# Patient Record
Sex: FEMALE | Race: WHITE
[De-identification: names, ages, dates, MRNs, and addresses within clinical notes are randomized per-mention and may not be internally consistent; named-entity substitution may affect disease eponyms.]

---

## 2017-11-03 NOTE — TELEPHONE ENCOUNTER
APPT INFO    Date /Time:  11/15/17 10AM   Reason for Appt: R knee s/p R knee arthroscopy-    Ref Provider/Clinic:  YOVANY WHITEHEAD/ [facility]   Are there internal records? If yes, list: None for knee   Patient Contact (Y/N) & Call Details: No- referral   Action: Reviewed records     OUTSIDE RECORDS CHECKLIST     CLINIC NAME  COMMENTS REC (x) IMG (x)   [facility]  x            Records Received From:  [facility]     Date/Exam/Location  (specify location if different)   Office Notes:  10/16/17, 7/24/17, 7/13/17, 7/3/17, 6/19/17   Radiology Reports: - MRI lower right ext 7/7/17  - both knees 6/19/17  - US right lower ext 3/30/17  - right knee 2/20/17  Natalie De La Paz Notified (Y/N):    Procedure Notes:  (include injections) - right knee arthroscopy 10/4/17 Dr Bhatti   - right knee injection 7/13/17  - aspiration 6/9/17   Pathology Reports:  10/4/17   Missing:  knee replacement in 2016, images

## 2017-11-09 ASSESSMENT — ENCOUNTER SYMPTOMS
ARTHRALGIAS: 1
STIFFNESS: 1
JOINT SWELLING: 1

## 2017-11-11 ENCOUNTER — HEALTH MAINTENANCE LETTER (OUTPATIENT)
Age: 67
End: 2017-11-11

## 2017-11-13 NOTE — TELEPHONE ENCOUNTER
Received Imaging From:  [facility]    Image Type (x): Disc:__x___  Pacs:_____      Exam Date/Name:  right knee 12/28/15, 6/19/17 Comments: will bring to film room

## 2017-11-15 ENCOUNTER — PRE VISIT (OUTPATIENT)
Dept: ORTHOPEDICS | Facility: CLINIC | Age: 67
End: 2017-11-15

## 2017-11-15 ENCOUNTER — OFFICE VISIT (OUTPATIENT)
Dept: ORTHOPEDICS | Facility: CLINIC | Age: 67
End: 2017-11-15

## 2017-11-15 VITALS — WEIGHT: 207.7 LBS | BODY MASS INDEX: 34.6 KG/M2 | HEIGHT: 65 IN

## 2017-11-15 DIAGNOSIS — M65.961 SYNOVITIS OF RIGHT KNEE: Primary | ICD-10-CM

## 2017-11-15 RX ORDER — METOPROLOL TARTRATE 25 MG/1
25 TABLET, FILM COATED ORAL
COMMUNITY
Start: 2017-11-01 | End: 2020-01-17

## 2017-11-15 NOTE — NURSING NOTE
"Reason For Visit:   Chief Complaint   Patient presents with     Consult     Pt. states that she is here today for Right Knee Pain. HX: RIGHT KNEE ARTHROSCOPY WITH PARTIAL LATERAL MENISCECTOMY, DOS: 10/04/2017. Referring:  YOVANY WHITEHEAD       Pain Assessment  Patient Currently in Pain: Yes  0-10 Pain Scale: 4  Primary Pain Location: Knee  Pain Orientation: Right  Pain Descriptors: Discomfort  Alleviating Factors: Rest, NSAIDS  Aggravating Factors: Walking, Standing, Sitting, Movement               HEIGHT: 5' 4.567\", WEIGHT: 207 lbs 11.2 oz, BMI: Body mass index is 35.03 kg/(m^2).      Current Outpatient Prescriptions   Medication Sig Dispense Refill     Acetaminophen (TYLENOL PO)        metoprolol (LOPRESSOR) 25 MG tablet Take 25 mg by mouth       Naproxen Sodium (ALEVE PO) Take 1 tablet by mouth.         SIMVASTATIN PO Take 20 mg by mouth At Bedtime.       Fluticasone-Salmeterol (ADVAIR HFA IN) Inhale 1 puff into the lungs. 1-2 TIMES DAILY AS NEEDED       Multiple Vitamin (DAILY MULTIVITAMIN PO) Take  by mouth daily.       Calcium Carbonate-Vitamin D (CALCIUM + D PO) Take  by mouth daily.       Albuterol Sulfate (VENTOLIN IN) Inhale 2 puffs into the lungs as needed.            Allergies   Allergen Reactions     Animal Dander      Grass      Pollen Extract      Seasonal Allergies                "

## 2017-11-15 NOTE — LETTER
"11/15/2017       RE: Donna Etienne  [address]  [facility] [address]     Dear Colleague,    Thank you for referring your patient, Donna Etienne, to the [facility] at [facility]. Please see a copy of my visit note below.    I was present with the resident during the history and exam.  I discussed the case with the resident and agree with the findings as documented in the assessment and plan.        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     Plan:  We'll obtain synovial specimens from [place] and have these reviewed here. Once we have confirm the diagnosis we will reevaluate and determine plan.           History of Present Illness:   66 year old female  chief complaint: Right knee pain    HPI:  66, female, asthma with right knee pain. Had a right medial unicompartmental knee arthroplasty in 2016. This resolved her medial sided only pain. She then had an acute episode and developed lateral sided pain. MRI from June 2017 revealed a lateral meniscus tear. This was debrided and 10/4/2017. At that time was noted to be hypertrophic and nodular synovial tissue. This was sent for biopsy. Biopsy was concerning for PVNS. She continues have anterior mostly knee pain. Worse with stairs and P and worse after prolonged periods of sitting. No fevers or chills. No issues with wound healing. History bleeding or clotting problems.           Physical Exam:     EXAMINATION pertinent findings:   VITAL SIGNS: Height 1.64 m (5' 4.57\"), weight 94.2 kg (207 lb 11.2 oz).  Body mass index is 35.03 kg/(m^2).  RESP: non labored breathing   ABD: benign   SKIN: grossly normal   LYMPHATIC: " grossly normal   NEURO: grossly normal   VASCULAR: satisfactory perfusion of all extremities   MUSCULOSKELETAL:   Examination of the right knee: There is minimal effusion. There is no erythema or warmth. Her incisions are well-healed. Range of motion is 0  to 120 . Stable to varus and valgus. She has some mild crepitus in the patellofemoral joint with motion. She is tender over the lateral joint line and inferior patella. She is otherwise distally neurovascularly intact.             Data:   All laboratory data reviewed  All imaging studies reviewed by me    X-rays including MRI from June 2017 reviewed. There is some mild thickening of the synovium but no discrete masses or significant synovial hypertrophy.    Patient seen and discussed with Dr. Marilee Cárdenas  PGY4  [phone]      DATA for DOCUMENTATION:         Past Medical History:     Patient Active Problem List   Diagnosis   (none) - all problems resolved or deleted     Past Medical History:   Diagnosis Date     Asthma      DJD (degenerative joint disease)      Hepatitis childhood    type A     Liver disease     Hepatits A as a child     Osteopenia      Phlebitis     post childbirth 25 years ago     PONV (postoperative nausea and vomiting)        Also see scanned health assessment forms.       Past Surgical History:     Past Surgical History:   Procedure Laterality Date     ANGIOGRAPHY      HEART CATH     APPENDECTOMY       ARTHROPLASTY KNEE      Partial     ARTHROPLASTY KNEE      LEFT PARTIAL     C HAND/FINGER SURGERY UNLISTED      many on both     C SHOULDER SURG PROC UNLISTED  03/2017    rt. shoulder replacement     CL AFF SURGICAL PATHOLOGY       EXCISE MASS UPPER EXTREMITY  9/25/2012    Procedure: EXCISE MASS UPPER EXTREMITY;  Right Upper Arm Mass Excision;  Surgeon: Roge Do MD;  [facility]     HC INCISION TENDON SHEATH FINGER       KNEE SURGERY  11/18/2017    rt. knee partical replacement     RELEASE TRIGGER FINGER       RIGHT INDEX     REPAIR TENDON FINGER(S)  7/12/2012    Procedure: REPAIR TENDON FINGER(S);  Right Index Finger Tendon Sheath Reconstruction Using Flexor  Carpi Radialis (1/2) Tendon Graft.      ;  Surgeon: Alla Do MD;  [facility]     SCAPHOID/LUNATE LIGAMENT REPAIR[       SUTURE SUSPENSIONPLASTY THUMB  2/7/2012    Procedure:SUTURE SUSPENSIONPLASTY THUMB; Left Thumb Trapezial Excision Suture Suspenionplasty  ; Surgeon:ALLA DO; Location:[facility] OR     WRIST SURGERY              Social History:     Social History     Social History     Marital status:      Spouse name: N/A     Number of children: N/A     Years of education: N/A     Occupational History     Not on file.     Social History Main Topics     Smoking status: Never Smoker     Smokeless tobacco: Never Used     Alcohol use Yes      Comment: very seldom     Drug use: No     Sexual activity: Yes     Partners: Male     Birth control/ protection: Male Surgical      Comment: only with      Other Topics Concern     Not on file     Social History Narrative            Family History:       Family History   Problem Relation Age of Onset     Hypertension Mother      OSTEOPOROSIS Mother      Alcoholism Brother      Alcoholism Sister      Alcoholism Paternal Grandfather             Medications:     Current Outpatient Prescriptions   Medication Sig     Acetaminophen (TYLENOL PO)      metoprolol (LOPRESSOR) 25 MG tablet Take 25 mg by mouth     Naproxen Sodium (ALEVE PO) Take 1 tablet by mouth.       SIMVASTATIN PO Take 20 mg by mouth At Bedtime.     Fluticasone-Salmeterol (ADVAIR HFA IN) Inhale 1 puff into the lungs. 1-2 TIMES DAILY AS NEEDED     Multiple Vitamin (DAILY MULTIVITAMIN PO) Take  by mouth daily.     Calcium Carbonate-Vitamin D (CALCIUM + D PO) Take  by mouth daily.     Albuterol Sulfate (VENTOLIN IN) Inhale 2 puffs into the lungs as needed.     No current facility-administered medications for this visit.                Review of Systems:   A comprehensive 10 point review of systems (constitutional, ENT, cardiac, peripheral vascular, lymphatic, respiratory, GI, , Musculoskeletal, skin, Neurological) was performed and found to be negative except as described in this note.     See intake form completed by patient      Again, thank you for allowing me to participate in the care of your patient.      Sincerely,    Kiran Hnut MD

## 2017-11-15 NOTE — MR AVS SNAPSHOT
"              After Visit Summary   11/15/2017    Donna Etienne    MRN: [id number]           Patient Information     Date Of Birth          1950        Visit Information        Provider Department      11/15/2017 10:00 AM Kiran Hunt MD [facility]        Today's Diagnoses     Synovitis of right knee    -  1       Follow-ups after your visit        Who to contact     Please call your clinic at [phone] to:    Ask questions about your health    Make or cancel appointments    Discuss your medicines    Learn about your test results    Speak to your doctor   If you have compliments or concerns about an experience at your clinic, or if you wish to file a complaint, please contact [facility] Patient Relations at [phone] or email us at [email or web address]         Additional Information About Your Visit        MyChart Information     [employer] gives you secure access to your electronic health record. If you see a primary care provider, you can also send messages to your care team and make appointments. If you have questions, please call your primary care clinic.  If you do not have a primary care provider, please call [phone] and they will assist you.      [employer] is an electronic gateway that provides easy, online access to your medical records. With [employer], you can request a clinic appointment, read your test results, renew a prescription or communicate with your care team.     To access your existing account, please contact your [facility] Clinic or call [phone] for assistance.        Care EveryWhere ID     This is your Care EveryWhere ID. This could be used by other organizations to access your [facility] medical records  [id number]        Your Vitals Were     Height BMI (Body Mass Index)                1.64 m (5' 4.57\") 35.03 kg/m2           Blood Pressure from Last 3 Encounters:   09/25/12 104/69 "   07/12/12 112/79   02/07/12 101/77    Weight from Last 3 Encounters:   11/15/17 94.2 kg (207 lb 11.2 oz)   09/25/12 90.6 kg (199 lb 11.8 oz)   07/12/12 92.1 kg (203 lb)              Today, you had the following     No orders found for display       Primary Care Provider Office Phone # Fax #    Puma Joseph [phone]       [facility] [address] [address]        Equal Access to Services     DEVIN THOMSON : Hadii santos thao hadasho Soomaali, waaxda luqadaha, qaybta kaalmada adeegyastacy, eva figueroa . So [facility] [phone].    ATENCIÓN: Si habla español, tiene a ramos disposición servicios gratuitos de asistencia lingüística. Llame al [phone].    We comply with applicable federal civil rights laws and [place] laws. We do not discriminate on the basis of race, color, national origin, age, disability, sex, sexual orientation, or gender identity.            Thank you!     Thank you for choosing [facility]  for your care. Our goal is always to provide you with excellent care. Hearing back from our patients is one way we can continue to improve our services. Please take a few minutes to complete the written survey that you may receive in the mail after your visit with us. Thank you!             Your Updated Medication List - Protect others around you: Learn how to safely use, store and throw away your medicines at www.disposemymeds.org.          This list is accurate as of: 11/15/17 11:59 PM.  Always use your most recent med list.                   Brand Name Dispense Instructions for use Diagnosis    ADVAIR HFA IN      Inhale 1 puff into the lungs. 1-2 TIMES DAILY AS NEEDED        ALEVE PO      Take 1 tablet by mouth.        CALCIUM + D PO      Take  by mouth daily.        DAILY MULTIVITAMIN PO      Take  by mouth daily.        metoprolol 25 MG tablet    LOPRESSOR     Take 25 mg by mouth        SIMVASTATIN PO      Take 20 mg by mouth  At Bedtime.        TYLENOL PO           VENTOLIN IN      Inhale 2 puffs into the lungs as needed.

## 2017-11-15 NOTE — PROGRESS NOTES
"    [facility], Orthopaedic Surgery Consultation    Donna Etienne MRN# [id number]   Age: 66 year old YOB: 1950     Requesting physician: Boom Woodard            Assessment and Plan:   Assessment:  66, female, asthma with right anterior knee pain and outside synovial biopsy from 10-6-17 concerning for PVNS.     Plan:  We'll obtain synovial specimens from [place] and have these reviewed here. Once we have confirm the diagnosis we will reevaluate and determine plan.           History of Present Illness:   66 year old female  chief complaint: Right knee pain    HPI:  66, female, asthma with right knee pain. Had a right medial unicompartmental knee arthroplasty in 2016. This resolved her medial sided only pain. She then had an acute episode and developed lateral sided pain. MRI from June 2017 revealed a lateral meniscus tear. This was debrided and 10/4/2017. At that time was noted to be hypertrophic and nodular synovial tissue. This was sent for biopsy. Biopsy was concerning for PVNS. She continues have anterior mostly knee pain. Worse with stairs and P and worse after prolonged periods of sitting. No fevers or chills. No issues with wound healing. History bleeding or clotting problems.           Physical Exam:     EXAMINATION pertinent findings:   VITAL SIGNS: Height 1.64 m (5' 4.57\"), weight 94.2 kg (207 lb 11.2 oz).  Body mass index is 35.03 kg/(m^2).  RESP: non labored breathing   ABD: benign   SKIN: grossly normal   LYMPHATIC: grossly normal   NEURO: grossly normal   VASCULAR: satisfactory perfusion of all extremities   MUSCULOSKELETAL:   Examination of the right knee: There is minimal effusion. There is no erythema or warmth. Her incisions are well-healed. Range of motion is 0  to 120 . Stable to varus and valgus. She has some mild crepitus in the patellofemoral joint with motion. She is tender over the lateral joint line and inferior patella. She is otherwise distally " neurovascularly intact.             Data:   All laboratory data reviewed  All imaging studies reviewed by me    X-rays including MRI from June 2017 reviewed. There is some mild thickening of the synovium but no discrete masses or significant synovial hypertrophy.    Patient seen and discussed with Dr. Marilee Cárdenas  PGY4  [phone]      DATA for DOCUMENTATION:         Past Medical History:     Patient Active Problem List   Diagnosis   (none) - all problems resolved or deleted     Past Medical History:   Diagnosis Date     Asthma      DJD (degenerative joint disease)      Hepatitis childhood    type A     Liver disease     Hepatits A as a child     Osteopenia      Phlebitis     post childbirth 25 years ago     PONV (postoperative nausea and vomiting)        Also see scanned health assessment forms.       Past Surgical History:     Past Surgical History:   Procedure Laterality Date     ANGIOGRAPHY      HEART CATH     APPENDECTOMY       ARTHROPLASTY KNEE      Partial     ARTHROPLASTY KNEE      LEFT PARTIAL     C HAND/FINGER SURGERY UNLISTED      many on both     C SHOULDER SURG PROC UNLISTED  03/2017    rt. shoulder replacement     CL AFF SURGICAL PATHOLOGY       EXCISE MASS UPPER EXTREMITY  9/25/2012    Procedure: EXCISE MASS UPPER EXTREMITY;  Right Upper Arm Mass Excision;  Surgeon: Roge Do MD;  [facility]     HC INCISION TENDON SHEATH FINGER       KNEE SURGERY  11/18/2017    rt. knee partical replacement     RELEASE TRIGGER FINGER      RIGHT INDEX     REPAIR TENDON FINGER(S)  7/12/2012    Procedure: REPAIR TENDON FINGER(S);  Right Index Finger Tendon Sheath Reconstruction Using Flexor  Carpi Radialis (1/2) Tendon Graft.      ;  Surgeon: Roge Do MD;  [facility]     SCAPHOID/LUNATE LIGAMENT REPAIR[       SUTURE SUSPENSIONPLASTY THUMB  2/7/2012    Procedure:SUTURE SUSPENSIONPLASTY THUMB; Left Thumb Trapezial Excision Suture Suspenionplasty  ;  Surgeon:ALLA ARRIAZA; Location:[facility] OR     WRIST SURGERY              Social History:     Social History     Social History     Marital status:      Spouse name: N/A     Number of children: N/A     Years of education: N/A     Occupational History     Not on file.     Social History Main Topics     Smoking status: Never Smoker     Smokeless tobacco: Never Used     Alcohol use Yes      Comment: very seldom     Drug use: No     Sexual activity: Yes     Partners: Male     Birth control/ protection: Male Surgical      Comment: only with      Other Topics Concern     Not on file     Social History Narrative            Family History:       Family History   Problem Relation Age of Onset     Hypertension Mother      OSTEOPOROSIS Mother      Alcoholism Brother      Alcoholism Sister      Alcoholism Paternal Grandfather             Medications:     Current Outpatient Prescriptions   Medication Sig     Acetaminophen (TYLENOL PO)      metoprolol (LOPRESSOR) 25 MG tablet Take 25 mg by mouth     Naproxen Sodium (ALEVE PO) Take 1 tablet by mouth.       SIMVASTATIN PO Take 20 mg by mouth At Bedtime.     Fluticasone-Salmeterol (ADVAIR HFA IN) Inhale 1 puff into the lungs. 1-2 TIMES DAILY AS NEEDED     Multiple Vitamin (DAILY MULTIVITAMIN PO) Take  by mouth daily.     Calcium Carbonate-Vitamin D (CALCIUM + D PO) Take  by mouth daily.     Albuterol Sulfate (VENTOLIN IN) Inhale 2 puffs into the lungs as needed.     No current facility-administered medications for this visit.               Review of Systems:   A comprehensive 10 point review of systems (constitutional, ENT, cardiac, peripheral vascular, lymphatic, respiratory, GI, , Musculoskeletal, skin, Neurological) was performed and found to be negative except as described in this note.     See intake form completed by patient    Answers for HPI/ROS submitted by the patient on 11/9/2017   General Symptoms: No  Skin Symptoms: No  HENT Symptoms: No  EYE  SYMPTOMS: No  HEART SYMPTOMS: No  LUNG SYMPTOMS: No  INTESTINAL SYMPTOMS: No  URINARY SYMPTOMS: No  GYNECOLOGIC SYMPTOMS: No  BREAST SYMPTOMS: No  SKELETAL SYMPTOMS: Yes  BLOOD SYMPTOMS: No  NERVOUS SYSTEM SYMPTOMS: No  MENTAL HEALTH SYMPTOMS: No  Swollen joints: Yes  Joint pain: Yes  Joint stiffness: Yes

## 2017-11-16 LAB — MAMMOGRAM: NORMAL

## 2017-12-08 PROCEDURE — 00000346 ZZHCL STATISTIC REVIEW OUTSIDE SLIDES TC 88321: Performed by: ORTHOPAEDIC SURGERY

## 2017-12-11 LAB — COPATH REPORT: NORMAL

## 2017-12-21 ENCOUNTER — TELEPHONE (OUTPATIENT)
Dept: ORTHOPEDICS | Facility: CLINIC | Age: 67
End: 2017-12-21

## 2017-12-21 NOTE — TELEPHONE ENCOUNTER
Patient calling for biopsy results.  She was told to call 3 weeks after, which was last week and she did not hear anything then.  Message will be sent to Dr. Hunt's nurse, Stephanie, with call back number.

## 2017-12-21 NOTE — TELEPHONE ENCOUNTER
RN called and sp0ke with Korin.  Review of outside pathlogy showed:  Patient Name: KORIN HOOKS   MR#: [id number]   Specimen #: [id number]   Collected: 12/8/2017   Received: 12/8/2017   Reported: 12/11/2017 12:03   Ordering Phy(s): BUDDY ROBLES   Additional Phy(s): [facility]     For improved result formatting, select 'View Enhanced Report Format'   under Linked Documents section.     TEST(S):   2 Slides, case #[id number]     FINAL DIAGNOSIS:   CASE FROM [facility], [place], [place] ([id number], OBTAINED   10/04/2017):   RIGHT KNEE SYNOVIAL TISSUE, BIOPSY:   - Fragments of hypertrophic/proliferative synovium with chronic   inflammation, histiocytic and multinucleated giant cell infiltrate with   hemosiderin deposition, see comment.     No tumor seen, We are not offering surgery.  Please manage this with someone who would do a joint replacement.  She will go to her Ortho person.  Fax of path review to Dr. VENTURA [facility]

## 2018-03-05 ENCOUNTER — MEDICAL CORRESPONDENCE (OUTPATIENT)
Dept: HEALTH INFORMATION MANAGEMENT | Facility: CLINIC | Age: 68
End: 2018-03-05

## 2019-08-09 NOTE — TELEPHONE ENCOUNTER
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 Boom Woodard at [facility] referring to Dr. Rider // eduard faxed to clinic   DATE RECEIVED: Aug 26, 2019    NOTES STATUS DETAILS   OFFICE NOTE from referring provider Care Everywhere 7/29/19 Dr. Woodard   OFFICE NOTE from other specialist N/A    DISCHARGE SUMMARY from hospital N/A    DISCHARGE REPORT from the ER N/A    OPERATIVE REPORT Care Everywhere 12/27/18, 7/24/18   MEDICATION LIST Care Everywhere    IMPLANT RECORD/STICKER Care Everywhere    LABS     CBC/DIFF N/A    CULTURES N/A    INJECTIONS DONE IN RADIOLOGY Received     MRI Received    CT SCAN Received    XRAYS (IMAGES & REPORTS) Received    TUMOR     PATHOLOGY  Slides & report N/A

## 2019-08-12 ENCOUNTER — DOCUMENTATION ONLY (OUTPATIENT)
Dept: CARE COORDINATION | Facility: CLINIC | Age: 69
End: 2019-08-12

## 2019-08-26 ENCOUNTER — OFFICE VISIT (OUTPATIENT)
Dept: ORTHOPEDICS | Facility: CLINIC | Age: 69
End: 2019-08-26
Payer: COMMERCIAL

## 2019-08-26 ENCOUNTER — PRE VISIT (OUTPATIENT)
Dept: ORTHOPEDICS | Facility: CLINIC | Age: 69
End: 2019-08-26

## 2019-08-26 ENCOUNTER — DOCUMENTATION ONLY (OUTPATIENT)
Dept: ORTHOPEDICS | Facility: CLINIC | Age: 69
End: 2019-08-26

## 2019-08-26 VITALS — BODY MASS INDEX: 36.19 KG/M2 | WEIGHT: 212 LBS | HEIGHT: 64 IN

## 2019-08-26 DIAGNOSIS — Z96.619 PROSTHETIC SHOULDER INFECTION, INITIAL ENCOUNTER (H): Primary | ICD-10-CM

## 2019-08-26 DIAGNOSIS — T84.59XA PROSTHETIC SHOULDER INFECTION, INITIAL ENCOUNTER (H): Primary | ICD-10-CM

## 2019-08-26 DIAGNOSIS — T84.59XA PROSTHETIC SHOULDER INFECTION, INITIAL ENCOUNTER (H): ICD-10-CM

## 2019-08-26 DIAGNOSIS — Z96.619 PROSTHETIC SHOULDER INFECTION, INITIAL ENCOUNTER (H): ICD-10-CM

## 2019-08-26 LAB
ABO + RH BLD: NORMAL
ABO + RH BLD: NORMAL
BLD GP AB SCN SERPL QL: NORMAL
BLOOD BANK CMNT PATIENT-IMP: NORMAL
BLOOD BANK CMNT PATIENT-IMP: NORMAL
SPECIMEN EXP DATE BLD: NORMAL

## 2019-08-26 RX ORDER — AMOXICILLIN 500 MG/1
CAPSULE ORAL
Refills: 5 | COMMUNITY
Start: 2019-07-15

## 2019-08-26 RX ORDER — DESOXIMETASONE 2.5 MG/G
CREAM TOPICAL DAILY PRN
COMMUNITY
Start: 2019-04-15

## 2019-08-26 RX ORDER — AMLODIPINE BESYLATE 5 MG/1
5 TABLET ORAL DAILY
COMMUNITY
Start: 2019-02-26 | End: 2020-02-26

## 2019-08-26 RX ORDER — METOPROLOL TARTRATE 25 MG/1
25 TABLET, FILM COATED ORAL 2 TIMES DAILY
Status: ON HOLD | COMMUNITY
Start: 2018-11-07 | End: 2020-01-17

## 2019-08-26 ASSESSMENT — ENCOUNTER SYMPTOMS
MYALGIAS: 0
POLYDIPSIA: 0
FEVER: 0
MUSCLE CRAMPS: 0
EYE WATERING: 0
NIGHT SWEATS: 0
WEIGHT LOSS: 0
DECREASED APPETITE: 0
EYE PAIN: 0
ALTERED TEMPERATURE REGULATION: 0
BACK PAIN: 0
MUSCLE WEAKNESS: 0
JOINT SWELLING: 0
WEIGHT GAIN: 1
ARTHRALGIAS: 1
HALLUCINATIONS: 0
FATIGUE: 0
CHILLS: 0
DOUBLE VISION: 0
INCREASED ENERGY: 0
NECK PAIN: 0

## 2019-08-26 ASSESSMENT — MIFFLIN-ST. JEOR: SCORE: 1483.14

## 2019-08-26 NOTE — PROGRESS NOTES
Patient is scheduled for surgery with Dr. Desir     Spoke or left message with: patient in clinic     Date of Surgery: 9/25/19     Location: [place]     Informed patient they will need an adult  yes    Post-ops made 2 wks and 6wks with provider     Pre-op with surgeon (if applicable): yes     H&P: Scheduled with PCP     Additional imaging/appointments: n/a     Surgery packet: given in clinic     Additional comments: n/a

## 2019-08-26 NOTE — LETTER
8/26/2019       RE: Donna Etienne  [address]  [facility] [address]     Dear Colleague,    Thank you for referring your patient, Donna Etienne, to the [facility]. Please see a copy of my visit note below.    NEW PATIENT    Donna Etienne  MRN: [id number]    Chief complaint: Chronic left shoulder pain    HPI: 68 year old female presents with left shoulder pain and a complex surgical history.  The patient had a initial total shoulder arthroplasty performed at an outside facility by Dr. Boom Woodard in [place], [place] on July 24, 2018.  After surgery she was rehabbing and still having significant discomfort a few months later during therapy.  A CT arthrogram was obtained and concerning for a disruption of her subscapularis repair.  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  She was then taken back to surgery for a single stage revision with DePuy components and antibiotic loaded cement for the periprosthetic infection, this was done in December 2018.  Cultures were taken during this revision and held for several weeks, no growth was found.  Her implant from the revision was revised to a longstem component with proximal cement.  After surgery she was treated with 6 weeks of IV antibiotics.  She unfortunately had a 2 to 3 weeks of persistent wound drainage after the revision but her incision eventually healed.  She has had persistent pain, swelling and discomfort and again slow progress with therapy since the revision was done.  At one point she was treated with prednisone orally for this discomfort and it provided minimal relief.  At this time she is still concerned with her progress as therapy has been slow and the shoulder remains painful and weak.  She denies any recent fever or chills or recent wound problem.    Review of systems: 10 point  review performed and negative for anything other than what mentioned above for musculoskeletal    Past Medical History:   Diagnosis Date     Asthma      DJD (degenerative joint disease)      Hepatitis childhood    type A     Liver disease     Hepatits A as a child     Osteopenia      Phlebitis     post childbirth 25 years ago     PONV (postoperative nausea and vomiting)        Past Surgical History:   Procedure Laterality Date     ANGIOGRAPHY      HEART CATH     APPENDECTOMY       ARTHROPLASTY KNEE      Partial     ARTHROPLASTY KNEE      LEFT PARTIAL     C HAND/FINGER SURGERY UNLISTED      many on both     C SHOULDER SURG PROC UNLISTED  03/2017    rt. shoulder replacement     CL AFF SURGICAL PATHOLOGY       EXCISE MASS UPPER EXTREMITY  9/25/2012    Procedure: EXCISE MASS UPPER EXTREMITY;  Right Upper Arm Mass Excision;  Surgeon: Alla Do MD;  [facility]     HC INCISION TENDON SHEATH FINGER       KNEE SURGERY  11/18/2017    rt. knee partical replacement     RELEASE TRIGGER FINGER      RIGHT INDEX     REPAIR TENDON FINGER(S)  7/12/2012    Procedure: REPAIR TENDON FINGER(S);  Right Index Finger Tendon Sheath Reconstruction Using Flexor  Carpi Radialis (1/2) Tendon Graft.      ;  Surgeon: Alla Do MD;  [facility]     SCAPHOID/LUNATE LIGAMENT REPAIR[       SUTURE SUSPENSIONPLASTY THUMB  2/7/2012    Procedure:SUTURE SUSPENSIONPLASTY THUMB; Left Thumb Trapezial Excision Suture Suspenionplasty  ; Surgeon:ALLA DO; Location:[facility]     WRIST SURGERY         SOCIAL HISTORY  No smoking, occasional drinking, no illicits  Retired     Medications see chart       Allergies   Allergen Reactions     Bee Pollen Shortness Of Breath     Prednisone Itching and Other (See Comments)     Swelling of the face     Animal Dander      Grass      Hydrocodone-Acetaminophen      Other reaction(s): Hives, Rash     Latex      Other reaction(s): Rash     Oxycodone-Acetaminophen  Itching     Pollen Extract      Seasonal Allergies      Tramadol Nausea     Cortisone Other (See Comments)     Flushing and headache     Rifampin Other (See Comments)     swelling around mouth       Examination  General: Alert and oriented, atraumatic normocephalic  Cardiovascular: Extremities well-perfused, upper extremity distal pulses with regular rhythm  Respiratory: Nonlabored breathing  Musculoskeletal: There is a well-healed incision over the left shoulder without any evidence of current drainage or sinus tract.  There is no surrounding erythema or edema or excessive warmth.  There is global tenderness about the shoulder girdle.  She is able to achieve about 100 degrees of active forward flexion and abduction, she can internally rotate to the sacral region and active external rotation to about 45 degrees.  She has weakness throughout testing of the rotator cuff with concern for particular weakness in the supraspinatus infraspinatus and subscapularis.  There is no detectable instability.  Distally her motor and sensory function in the median radial and ulnar nerve distributions are intact, she is able to set the deltoid and has sensation over the axillary nerve distribution proximally.    Imaging  X-rays from an outside facility of the shoulder demonstrate intact hardware consistent with the revision total shoulder arthroplasty, there is a long diaphyseal fitting Depuy stem in place which also has metaphyseal coding and a proximal cement mantle.  No evidence of acute hardware failure.    Assessment and plan: 68 year old female with a chronic periprosthetic left shoulder infection.  She previously had a single stage revision performed in December 2018 at an outside facility, the details of her surgical history are listed above.  She has had persistent pain swelling and weakness of the shoulder and we suspect that there is a persistent indolent infection.  At this time we recommend that she meet with our  infectious disease specialist to coordinate for a second revision arthroplasty.  We will plan to get her on the schedule next month and our plan for the initial surgery will be to perform an explant of her current implants, take intraoperative cultures, and reimplant a antibiotic loaded cement spacer after a thorough debridement.  We will have her obtain a PICC line the day prior to surgery and plan for 6 weeks of IV therapy after the procedure.  After completing her IV therapy she will need an antibiotic holiday and then arthroscopic biopsies to confirm infection eradication prior to definitive reimplantation.  While her antibiotic spacer is in place we will plan to get a CT of the shoulder for operative planning purposes in the reimplantation phase.    August 26, 2019    Ricci Banks DO  Orthopedic Surgery Sports Medicine Fellow    I saw the patient with the fellow.  I agree with the fellow note and plan of care.      Jorge A Desir MD

## 2019-08-26 NOTE — NURSING NOTE
Teaching Flowsheet   Relevant Diagnosis: Prosthetic shoulder infection  Teaching Topic: Pre-op for infected TSA removal, placement spacer - to be scheduled 09/25/19 at [facility]     Person(s) involved in teaching:   Patient  Spouse     Motivation Level:  Asks Questions: Yes  Cooperative: Yes  Receptive (willing/able to accept information): Yes  Any cultural factors/Zoroastrianism beliefs that may influence understanding or compliance? No    Family demonstrates understanding of the following:  Reason for the appointment, diagnosis and treatment plan: Yes  Knowledge of proper use of medications and conditions for which they are ordered (with special attention to potential side effects or drug interactions): Yes  Which situations necessitate calling provider and whom to contact: Yes     Teaching Concerns Addressed:   Pre-op H&P at [facility] in [place].  Will need Infectious Disease appointment and PICC placement before surgery  Aware of post-op expectations    Proper use and care of sling x 6 weeks (medical equip, care aids, etc.): Yes  Nutritional needs and diet plan: Yes  Pain management techniques: Yes  Wound Care: Yes  How and/when to access community resources: Yes     Instructional Materials Used/Given: Pre-op packet, surgical soap

## 2019-08-26 NOTE — PROGRESS NOTES
I saw the patient with the fellow.  I agree with the fellow note and plan of care.      Jorge A Desir MD    NEW PATIENT    Donna Etienne  MRN: [id number]      Chief complaint: Chronic left shoulder pain    HPI: 68 year old female presents with left shoulder pain and a complex surgical history.  The patient had a initial total shoulder arthroplasty performed at an outside facility by Dr. Boom Woodard in [place], [place] on July 24, 2018.  After surgery she was rehabbing and still having significant discomfort a few months later during therapy.  A CT arthrogram was obtained and concerning for a disruption of her subscapularis repair.  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  She was then taken back to surgery for a single stage revision with DePuy components and antibiotic loaded cement for the periprosthetic infection, this was done in December 2018.  Cultures were taken during this revision and held for several weeks, no growth was found.  Her implant from the revision was revised to a longstem component with proximal cement.  After surgery she was treated with 6 weeks of IV antibiotics.  She unfortunately had a 2 to 3 weeks of persistent wound drainage after the revision but her incision eventually healed.  She has had persistent pain, swelling and discomfort and again slow progress with therapy since the revision was done.  At one point she was treated with prednisone orally for this discomfort and it provided minimal relief.  At this time she is still concerned with her progress as therapy has been slow and the shoulder remains painful and weak.  She denies any recent fever or chills or recent wound problem.    Review of systems: 10 point review performed and negative for anything other than what mentioned above for musculoskeletal    Past Medical History:   Diagnosis Date     Asthma      DJD (degenerative  joint disease)      Hepatitis childhood    type A     Liver disease     Hepatits A as a child     Osteopenia      Phlebitis     post childbirth 25 years ago     PONV (postoperative nausea and vomiting)        Past Surgical History:   Procedure Laterality Date     ANGIOGRAPHY      HEART CATH     APPENDECTOMY       ARTHROPLASTY KNEE      Partial     ARTHROPLASTY KNEE      LEFT PARTIAL     C HAND/FINGER SURGERY UNLISTED      many on both     C SHOULDER SURG PROC UNLISTED  03/2017    rt. shoulder replacement     CL AFF SURGICAL PATHOLOGY       EXCISE MASS UPPER EXTREMITY  9/25/2012    Procedure: EXCISE MASS UPPER EXTREMITY;  Right Upper Arm Mass Excision;  Surgeon: Alla Do MD;  [facility]     HC INCISION TENDON SHEATH FINGER       KNEE SURGERY  11/18/2017    rt. knee partical replacement     RELEASE TRIGGER FINGER      RIGHT INDEX     REPAIR TENDON FINGER(S)  7/12/2012    Procedure: REPAIR TENDON FINGER(S);  Right Index Finger Tendon Sheath Reconstruction Using Flexor  Carpi Radialis (1/2) Tendon Graft.      ;  Surgeon: Alla Do MD;  [facility]     SCAPHOID/LUNATE LIGAMENT REPAIR[       SUTURE SUSPENSIONPLASTY THUMB  2/7/2012    Procedure:SUTURE SUSPENSIONPLASTY THUMB; Left Thumb Trapezial Excision Suture Suspenionplasty  ; Surgeon:ALLA DO; [facility]     WRIST SURGERY         SOCIAL HISTORY  No smoking, occasional drinking, no illicits  Retired     Medications see chart       Allergies   Allergen Reactions     Bee Pollen Shortness Of Breath     Prednisone Itching and Other (See Comments)     Swelling of the face     Animal Dander      Grass      Hydrocodone-Acetaminophen      Other reaction(s): Hives, Rash     Latex      Other reaction(s): Rash     Oxycodone-Acetaminophen Itching     Pollen Extract      Seasonal Allergies      Tramadol Nausea     Cortisone Other (See Comments)     Flushing and headache     Rifampin Other (See Comments)      swelling around mouth       Examination  General: Alert and oriented, atraumatic normocephalic  Cardiovascular: Extremities well-perfused, upper extremity distal pulses with regular rhythm  Respiratory: Nonlabored breathing  Musculoskeletal: There is a well-healed incision over the left shoulder without any evidence of current drainage or sinus tract.  There is no surrounding erythema or edema or excessive warmth.  There is global tenderness about the shoulder girdle.  She is able to achieve about 100 degrees of active forward flexion and abduction, she can internally rotate to the sacral region and active external rotation to about 45 degrees.  She has weakness throughout testing of the rotator cuff with concern for particular weakness in the supraspinatus infraspinatus and subscapularis.  There is no detectable instability.  Distally her motor and sensory function in the median radial and ulnar nerve distributions are intact, she is able to set the deltoid and has sensation over the axillary nerve distribution proximally.    Imaging  X-rays from an outside facility of the shoulder demonstrate intact hardware consistent with the revision total shoulder arthroplasty, there is a long diaphyseal fitting Depuy stem in place which also has metaphyseal coding and a proximal cement mantle.  No evidence of acute hardware failure.    Assessment and plan: 68 year old female with a chronic periprosthetic left shoulder infection.  She previously had a single stage revision performed in December 2018 at an outside facility, the details of her surgical history are listed above.  She has had persistent pain swelling and weakness of the shoulder and we suspect that there is a persistent indolent infection.  At this time we recommend that she meet with our infectious disease specialist to coordinate for a second revision arthroplasty.  We will plan to get her on the schedule next month and our plan for the initial surgery will be  to perform an explant of her current implants, take intraoperative cultures, and reimplant a antibiotic loaded cement spacer after a thorough debridement.  We will have her obtain a PICC line the day prior to surgery and plan for 6 weeks of IV therapy after the procedure.  After completing her IV therapy she will need an antibiotic holiday and then arthroscopic biopsies to confirm infection eradication prior to definitive reimplantation.  While her antibiotic spacer is in place we will plan to get a CT of the shoulder for operative planning purposes in the reimplantation phase.    August 26, 2019    Ricci Banks DO  Orthopedic Surgery Sports Medicine Fellow

## 2019-08-26 NOTE — NURSING NOTE
"Reason For Visit:   Chief Complaint   Patient presents with     Consult     Left shoulder pain. Has had 2 left shoulder surgeries.        PCP: Puma Joseph      ? No  Occupation [employer] .  Currently working? Yes.  Work status?  Full time. Per what she can do  Date of injury: None  Date of surgery: 3/2017   Type of surgery: Right shoulder replacement  Date of surgery: 7/2018  Type of surgery: Left shoulder replacement  Date of surgery 12/2018  Type of surgery Left shoulder surgery. States it was another replacement  Smoker: No    Right hand dominant    SANE score  Affected shoulder: Left   Right shoulder SANE: 80  Left shoulder SANE: 60    Dynamometer  Forward Elevation:  R = 7 pounds,  L = 5 pounds  External Rotation:   R = 14 pounds,  L = 7 pounds    Ht 1.62 m (5' 3.78\")   Wt 96.2 kg (212 lb)   BMI 36.64 kg/m        Pain Assessment  Patient Currently in Pain: Lanies      Marah Nieves LPN        "

## 2019-08-28 ENCOUNTER — TELEPHONE (OUTPATIENT)
Dept: INFECTIOUS DISEASES | Facility: CLINIC | Age: 69
End: 2019-08-28

## 2019-08-28 DIAGNOSIS — M86.9 OSTEOMYELITIS (H): ICD-10-CM

## 2019-08-28 DIAGNOSIS — B99.9 INFECTION: Primary | ICD-10-CM

## 2019-08-28 NOTE — TELEPHONE ENCOUNTER
[facility] Call Center    Phone Message    May a detailed message be left on voicemail: yes    Reason for Call: Pt being referred by Dr. Ronny Desir for Prosthetic shoulder infection and requesting Pt be seen by either Valorie Lua or Cassie. Pt needs to be seen before surgery on 9/25. Unable to schedule within timeframe requested by referring provider, so sending message. Please call Pt for scheduling.     Action Taken: Message routed to:  [facility] ([facility] Infectious Disease Clinic

## 2019-08-29 NOTE — TELEPHONE ENCOUNTER
Apt made for pt to see Dr Aiken on 9/11. PICC placement apt made for 9/24 at [facility], for Surgery by Dr Desir on 9/25. Pt amenable to this plan.  Yi Basilio RN

## 2019-09-11 ENCOUNTER — OFFICE VISIT (OUTPATIENT)
Dept: CT IMAGING | Facility: CLINIC | Age: 69
End: 2019-09-11
Attending: INTERNAL MEDICINE
Payer: COMMERCIAL

## 2019-09-11 VITALS
HEART RATE: 81 BPM | TEMPERATURE: 98.4 F | DIASTOLIC BLOOD PRESSURE: 72 MMHG | SYSTOLIC BLOOD PRESSURE: 123 MMHG | OXYGEN SATURATION: 97 %

## 2019-09-11 DIAGNOSIS — M25.512 CHRONIC LEFT SHOULDER PAIN: Primary | ICD-10-CM

## 2019-09-11 DIAGNOSIS — G89.29 CHRONIC LEFT SHOULDER PAIN: Primary | ICD-10-CM

## 2019-09-11 PROCEDURE — G0463 HOSPITAL OUTPT CLINIC VISIT: HCPCS | Mod: ZF

## 2019-09-11 ASSESSMENT — PAIN SCALES - GENERAL: PAINLEVEL: MILD PAIN (3)

## 2019-09-11 NOTE — NURSING NOTE
Visit Reason: New- referral from Ortho for left shoulder infection    Evaluation / Assessment: Patient reports pain in left shoulder at pain level 3/10. Vitals taken, allergies and medications reviewed.    Labs: NA    Procedure ordered? NA    Dressing Change: NA    Vaccine ordered / administered: NA    Other: NA

## 2019-09-14 NOTE — PROGRESS NOTES
[facility] New Patient Visit Note:    Assessment:  1. Persistent pain following prior L TSA that was refractory to a single-stage revision and 6 weeks of therapy with vanco+ceftriaxone->ceftriaxone alone and ~2-3 weeks of rifampin. 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 S saccharolyticus is skin juli of unclear pathogenicity in many situations. In this circumstance there seems to be sufficient evidence of infection, though it is possible that symptoms are due at least in part to un-cultivated organisms.   Agree with upcoming procedure for explant with spacer insertion. I have instructed her that it will be important for her to stay off antimicrobials in advance of this procedure to hopefully optimize conditions for culture yield.  Her relapse despite single-stage revision may be due to inadequate source control, though with cultures being negative at the time of surgery it is possible that un-cultivated organism/s non-susceptible to ceftriaxone may be implicated.     Plan:  1. Await upcoming procedure. Agree with cultures x5 to be held 2 weeks. If cultures are preliminarily negative I would advocate sending for 16S sequencing and will communicate with the lab about this.  2. Avoid antibiotics prior to surgery for best yield. I counseled the patient about this.  3. Anticipate 6 weeks of antibiotics followed by re-aspiration. The presence/absence of growth will be interpreted based on intra-op data from explant procedure.    It is a pleasure to participate in Donna's care. Visit length 45 min, >50% clinical counseling/care coordination.    Ana Luisa Aiken MD   of Medicine, Division of Infectious Diseases  [facility] [phone]      HPI:  This is a pleasant 67 y/o woman with PMH DJD s/p L TSA in July 2018. She unfortunately had persistence of discomfort and several months postoperatively imaging revealed possibly disrupted subscapularis repair and in 11/2018 an aspiration  revealed Staph saccharolyticus (S to penicillin and clinda, R to metronidazole). She underwent single-stage repair in 12/2018. Cultures obtained during this revision revealed no growth. She was treated with 6 weeks IV therapy - based on review of notes and conversation with the patient, she received ~2 weeks of vanco (stopped following ? Return of sensis) ~2-3 weeks of rifampin (stopped due to s/e including mouth swelling) and 6 weeks of ceftriaxone. Her incision demonstrated delayed healing. She continued to have pain and was ultimately referred to Dr. Desir, who anticipates a 2-stage revision arthroplasty.     Apart from her L shoulder pain, she feels relatively well. She previously underwent R shoulder arthroplasty and her R arm gives her little trouble.     Current Outpatient Medications   Medication     Carboxymethylcellulose Sodium (EYE DROPS OP)     Acetaminophen (TYLENOL PO)     Albuterol Sulfate (VENTOLIN IN)     amLODIPine (NORVASC) 5 MG tablet     amoxicillin (AMOXIL) 500 MG capsule     Calcium Carbonate-Vitamin D (CALCIUM + D PO)     desoximetasone (TOPICORT) 0.25 % external cream     Fluticasone-Salmeterol (ADVAIR HFA IN)     metoprolol tartrate (LOPRESSOR) 25 MG tablet     Multiple Vitamin (DAILY MULTIVITAMIN PO)     Naproxen Sodium (ALEVE PO)     SIMVASTATIN PO     No current facility-administered medications for this visit.        Past Medical History:   Diagnosis Date     Asthma      DJD (degenerative joint disease)      Hepatitis childhood    type A     Liver disease     Hepatits A as a child     Osteopenia      Phlebitis     post childbirth 25 years ago     PONV (postoperative nausea and vomiting)      Past Surgical History:   Procedure Laterality Date     ANGIOGRAPHY      HEART CATH     APPENDECTOMY       ARTHROPLASTY KNEE      Partial     ARTHROPLASTY KNEE      LEFT PARTIAL     C HAND/FINGER SURGERY UNLISTED      many on both     C SHOULDER SURG PROC UNLISTED  03/2017    rt. shoulder  replacement     CL AFF SURGICAL PATHOLOGY       EXCISE MASS UPPER EXTREMITY  9/25/2012    Procedure: EXCISE MASS UPPER EXTREMITY;  Right Upper Arm Mass Excision;  Surgeon: Alla Do MD;  [facility]     HC INCISION TENDON SHEATH FINGER       KNEE SURGERY  11/18/2017    rt. knee partical replacement     RELEASE TRIGGER FINGER      RIGHT INDEX     REPAIR TENDON FINGER(S)  7/12/2012    Procedure: REPAIR TENDON FINGER(S);  Right Index Finger Tendon Sheath Reconstruction Using Flexor  Carpi Radialis (1/2) Tendon Graft.      ;  Surgeon: Alla Do MD;  [facility]     SCAPHOID/LUNATE LIGAMENT REPAIR[       SUTURE SUSPENSIONPLASTY THUMB  2/7/2012    Procedure:SUTURE SUSPENSIONPLASTY THUMB; Left Thumb Trapezial Excision Suture Suspenionplasty  ; Surgeon:ALLA DO; [facility]     WRIST SURGERY       Family History   Problem Relation Age of Onset     Hypertension Mother      Osteoporosis Mother      Alcoholism Brother      Alcoholism Sister      Alcoholism Paternal Grandfather      Social History     Tobacco Use     Smoking status: Never Smoker     Smokeless tobacco: Never Used   Substance Use Topics     Alcohol use: Yes     Comment: very seldom     Drug use: No     She reports her daughter is temporarily living with her, and a sister who was recently in receipt of a DUI relies on [employer] for transportation.    Exam:  /72   Pulse 81   Temp 98.4  F (36.9  C) (Oral)   SpO2 97%   L shoulder arthroplasty incision is well-appearing  HR regular  Lungs clear  No skin rash  No edema

## 2019-09-24 ENCOUNTER — TRANSFERRED RECORDS (OUTPATIENT)
Dept: HEALTH INFORMATION MANAGEMENT | Facility: CLINIC | Age: 69
End: 2019-09-24

## 2019-09-25 ENCOUNTER — APPOINTMENT (OUTPATIENT)
Dept: GENERAL RADIOLOGY | Facility: CLINIC | Age: 69
DRG: 493 | End: 2019-09-25
Attending: ORTHOPAEDIC SURGERY
Payer: COMMERCIAL

## 2019-09-25 ENCOUNTER — ANESTHESIA EVENT (OUTPATIENT)
Dept: SURGERY | Facility: CLINIC | Age: 69
DRG: 493 | End: 2019-09-25
Payer: COMMERCIAL

## 2019-09-25 ENCOUNTER — ANESTHESIA (OUTPATIENT)
Dept: SURGERY | Facility: CLINIC | Age: 69
DRG: 493 | End: 2019-09-25
Payer: COMMERCIAL

## 2019-09-25 ENCOUNTER — HOSPITAL ENCOUNTER (INPATIENT)
Facility: CLINIC | Age: 69
LOS: 4 days | Discharge: HOME HEALTH | DRG: 493 | End: 2019-09-29
Attending: ORTHOPAEDIC SURGERY | Admitting: ORTHOPAEDIC SURGERY
Payer: COMMERCIAL

## 2019-09-25 DIAGNOSIS — Z98.890 STATUS POST SHOULDER SURGERY: Primary | ICD-10-CM

## 2019-09-25 LAB
BACTERIA SPEC CULT: NORMAL
CREAT SERPL-MCNC: 1.26 MG/DL (ref 0.52–1.04)
GFR SERPL CREATININE-BSD FRML MDRD: 44 ML/MIN/{1.73_M2}
GLUCOSE BLDC GLUCOMTR-MCNC: 97 MG/DL (ref 70–99)
Lab: NORMAL
SPECIMEN SOURCE: NORMAL

## 2019-09-25 PROCEDURE — P9041 ALBUMIN (HUMAN),5%, 50ML: HCPCS | Performed by: NURSE ANESTHETIST, CERTIFIED REGISTERED

## 2019-09-25 PROCEDURE — 0PSG04Z REPOSITION LEFT HUMERAL SHAFT WITH INTERNAL FIXATION DEVICE, OPEN APPROACH: ICD-10-PCS | Performed by: ORTHOPAEDIC SURGERY

## 2019-09-25 PROCEDURE — 25000566 ZZH SEVOFLURANE, EA 15 MIN: Performed by: ORTHOPAEDIC SURGERY

## 2019-09-25 PROCEDURE — 25000128 H RX IP 250 OP 636: Performed by: ANESTHESIOLOGY

## 2019-09-25 PROCEDURE — 25000132 ZZH RX MED GY IP 250 OP 250 PS 637: Performed by: INTERNAL MEDICINE

## 2019-09-25 PROCEDURE — 86901 BLOOD TYPING SEROLOGIC RH(D): CPT | Performed by: ANESTHESIOLOGY

## 2019-09-25 PROCEDURE — 25000128 H RX IP 250 OP 636: Performed by: NURSE ANESTHETIST, CERTIFIED REGISTERED

## 2019-09-25 PROCEDURE — 25000128 H RX IP 250 OP 636: Performed by: ORTHOPAEDIC SURGERY

## 2019-09-25 PROCEDURE — 27210794 ZZH OR GENERAL SUPPLY STERILE: Performed by: ORTHOPAEDIC SURGERY

## 2019-09-25 PROCEDURE — 25800030 ZZH RX IP 258 OP 636: Performed by: ORTHOPAEDIC SURGERY

## 2019-09-25 PROCEDURE — 40000170 ZZH STATISTIC PRE-PROCEDURE ASSESSMENT II: Performed by: ORTHOPAEDIC SURGERY

## 2019-09-25 PROCEDURE — 99207 ZZC CDG-HISTORY COMP: MEETS EXP. PROBLEM FOCUSED - DOWN CODED LACK OF HPI: CPT | Performed by: INTERNAL MEDICINE

## 2019-09-25 PROCEDURE — 36000066 ZZH SURGERY LEVEL 4 W FLUORO 1ST 30 MIN - UMMC: Performed by: ORTHOPAEDIC SURGERY

## 2019-09-25 PROCEDURE — 25000132 ZZH RX MED GY IP 250 OP 250 PS 637: Performed by: ORTHOPAEDIC SURGERY

## 2019-09-25 PROCEDURE — 82565 ASSAY OF CREATININE: CPT | Performed by: ORTHOPAEDIC SURGERY

## 2019-09-25 PROCEDURE — 25800030 ZZH RX IP 258 OP 636: Performed by: NURSE ANESTHETIST, CERTIFIED REGISTERED

## 2019-09-25 PROCEDURE — 86850 RBC ANTIBODY SCREEN: CPT | Performed by: ANESTHESIOLOGY

## 2019-09-25 PROCEDURE — C9290 INJ, BUPIVACAINE LIPOSOME: HCPCS | Performed by: ANESTHESIOLOGY

## 2019-09-25 PROCEDURE — 86900 BLOOD TYPING SEROLOGIC ABO: CPT | Performed by: ANESTHESIOLOGY

## 2019-09-25 PROCEDURE — 25800030 ZZH RX IP 258 OP 636: Performed by: ANESTHESIOLOGY

## 2019-09-25 PROCEDURE — 0RHK08Z INSERTION OF SPACER INTO LEFT SHOULDER JOINT, OPEN APPROACH: ICD-10-PCS | Performed by: ORTHOPAEDIC SURGERY

## 2019-09-25 PROCEDURE — 12000001 ZZH R&B MED SURG/OB UMMC

## 2019-09-25 PROCEDURE — 71000015 ZZH RECOVERY PHASE 1 LEVEL 2 EA ADDTL HR: Performed by: ORTHOPAEDIC SURGERY

## 2019-09-25 PROCEDURE — 27110028 ZZH OR GENERAL SUPPLY NON-STERILE: Performed by: ORTHOPAEDIC SURGERY

## 2019-09-25 PROCEDURE — 27810169 ZZH OR IMPLANT GENERAL: Performed by: ORTHOPAEDIC SURGERY

## 2019-09-25 PROCEDURE — 25000125 ZZHC RX 250: Performed by: NURSE ANESTHETIST, CERTIFIED REGISTERED

## 2019-09-25 PROCEDURE — 99232 SBSQ HOSP IP/OBS MODERATE 35: CPT | Performed by: INTERNAL MEDICINE

## 2019-09-25 PROCEDURE — 40000278 XR SURGERY CARM FLUORO LESS THAN 5 MIN: Mod: TC

## 2019-09-25 PROCEDURE — 37000008 ZZH ANESTHESIA TECHNICAL FEE, 1ST 30 MIN: Performed by: ORTHOPAEDIC SURGERY

## 2019-09-25 PROCEDURE — 71000014 ZZH RECOVERY PHASE 1 LEVEL 2 FIRST HR: Performed by: ORTHOPAEDIC SURGERY

## 2019-09-25 PROCEDURE — 37000009 ZZH ANESTHESIA TECHNICAL FEE, EACH ADDTL 15 MIN: Performed by: ORTHOPAEDIC SURGERY

## 2019-09-25 PROCEDURE — 25000125 ZZHC RX 250: Performed by: ORTHOPAEDIC SURGERY

## 2019-09-25 PROCEDURE — 25000125 ZZHC RX 250: Performed by: ANESTHESIOLOGY

## 2019-09-25 PROCEDURE — 87176 TISSUE HOMOGENIZATION CULTR: CPT | Performed by: ORTHOPAEDIC SURGERY

## 2019-09-25 PROCEDURE — 87081 CULTURE SCREEN ONLY: CPT | Performed by: ORTHOPAEDIC SURGERY

## 2019-09-25 PROCEDURE — 86923 COMPATIBILITY TEST ELECTRIC: CPT | Performed by: ANESTHESIOLOGY

## 2019-09-25 PROCEDURE — 87075 CULTR BACTERIA EXCEPT BLOOD: CPT | Performed by: ORTHOPAEDIC SURGERY

## 2019-09-25 PROCEDURE — 27211024 ZZHC OR SUPPLY OTHER OPNP: Performed by: ORTHOPAEDIC SURGERY

## 2019-09-25 PROCEDURE — 0RPK0JZ REMOVAL OF SYNTHETIC SUBSTITUTE FROM LEFT SHOULDER JOINT, OPEN APPROACH: ICD-10-PCS | Performed by: ORTHOPAEDIC SURGERY

## 2019-09-25 PROCEDURE — 00000146 ZZHCL STATISTIC GLUCOSE BY METER IP

## 2019-09-25 PROCEDURE — 36000064 ZZH SURGERY LEVEL 4 EA 15 ADDTL MIN - UMMC: Performed by: ORTHOPAEDIC SURGERY

## 2019-09-25 PROCEDURE — 25800025 ZZH RX 258: Performed by: ORTHOPAEDIC SURGERY

## 2019-09-25 PROCEDURE — 87070 CULTURE OTHR SPECIMN AEROBIC: CPT | Performed by: ORTHOPAEDIC SURGERY

## 2019-09-25 DEVICE — BONE CEMENT PALACOS W/GENTAMICIN 00-1113-140-01
Type: IMPLANTABLE DEVICE | Site: SHOULDER | Status: NON-FUNCTIONAL
Removed: 2020-01-15

## 2019-09-25 RX ORDER — AMOXICILLIN 250 MG
1 CAPSULE ORAL 2 TIMES DAILY
Status: DISCONTINUED | OUTPATIENT
Start: 2019-09-25 | End: 2019-09-29 | Stop reason: HOSPADM

## 2019-09-25 RX ORDER — SODIUM CHLORIDE, SODIUM LACTATE, POTASSIUM CHLORIDE, CALCIUM CHLORIDE 600; 310; 30; 20 MG/100ML; MG/100ML; MG/100ML; MG/100ML
INJECTION, SOLUTION INTRAVENOUS CONTINUOUS
Status: DISCONTINUED | OUTPATIENT
Start: 2019-09-25 | End: 2019-09-25 | Stop reason: HOSPADM

## 2019-09-25 RX ORDER — EPHEDRINE SULFATE 50 MG/ML
INJECTION, SOLUTION INTRAMUSCULAR; INTRAVENOUS; SUBCUTANEOUS PRN
Status: DISCONTINUED | OUTPATIENT
Start: 2019-09-25 | End: 2019-09-25

## 2019-09-25 RX ORDER — SCOLOPAMINE TRANSDERMAL SYSTEM 1 MG/1
1 PATCH, EXTENDED RELEASE TRANSDERMAL
Status: DISCONTINUED | OUTPATIENT
Start: 2019-09-25 | End: 2019-09-25 | Stop reason: HOSPADM

## 2019-09-25 RX ORDER — DEXAMETHASONE SODIUM PHOSPHATE 4 MG/ML
INJECTION, SOLUTION INTRA-ARTICULAR; INTRALESIONAL; INTRAMUSCULAR; INTRAVENOUS; SOFT TISSUE PRN
Status: DISCONTINUED | OUTPATIENT
Start: 2019-09-25 | End: 2019-09-25

## 2019-09-25 RX ORDER — LIDOCAINE HYDROCHLORIDE 20 MG/ML
INJECTION, SOLUTION INFILTRATION; PERINEURAL PRN
Status: DISCONTINUED | OUTPATIENT
Start: 2019-09-25 | End: 2019-09-25

## 2019-09-25 RX ORDER — CEFTRIAXONE 2 G/1
2 INJECTION, POWDER, FOR SOLUTION INTRAMUSCULAR; INTRAVENOUS
Status: COMPLETED | OUTPATIENT
Start: 2019-09-25 | End: 2019-09-25

## 2019-09-25 RX ORDER — PROPOFOL 10 MG/ML
INJECTION, EMULSION INTRAVENOUS PRN
Status: DISCONTINUED | OUTPATIENT
Start: 2019-09-25 | End: 2019-09-25

## 2019-09-25 RX ORDER — SIMVASTATIN 20 MG
20 TABLET ORAL AT BEDTIME
Status: DISCONTINUED | OUTPATIENT
Start: 2019-09-25 | End: 2019-09-29 | Stop reason: HOSPADM

## 2019-09-25 RX ORDER — AMOXICILLIN 250 MG
2 CAPSULE ORAL 2 TIMES DAILY
Status: DISCONTINUED | OUTPATIENT
Start: 2019-09-25 | End: 2019-09-29 | Stop reason: HOSPADM

## 2019-09-25 RX ORDER — BUPIVACAINE HYDROCHLORIDE 2.5 MG/ML
INJECTION, SOLUTION EPIDURAL; INFILTRATION; INTRACAUDAL PRN
Status: DISCONTINUED | OUTPATIENT
Start: 2019-09-25 | End: 2019-09-25

## 2019-09-25 RX ORDER — HYDROMORPHONE HYDROCHLORIDE 2 MG/1
2-4 TABLET ORAL EVERY 4 HOURS PRN
Status: DISCONTINUED | OUTPATIENT
Start: 2019-09-25 | End: 2019-09-26

## 2019-09-25 RX ORDER — HYDROMORPHONE HYDROCHLORIDE 1 MG/ML
.3-.5 INJECTION, SOLUTION INTRAMUSCULAR; INTRAVENOUS; SUBCUTANEOUS
Status: DISCONTINUED | OUTPATIENT
Start: 2019-09-25 | End: 2019-09-29 | Stop reason: HOSPADM

## 2019-09-25 RX ORDER — SODIUM CHLORIDE 9 MG/ML
INJECTION, SOLUTION INTRAVENOUS CONTINUOUS
Status: DISCONTINUED | OUTPATIENT
Start: 2019-09-25 | End: 2019-09-29 | Stop reason: HOSPADM

## 2019-09-25 RX ORDER — MAGNESIUM HYDROXIDE 1200 MG/15ML
LIQUID ORAL PRN
Status: DISCONTINUED | OUTPATIENT
Start: 2019-09-25 | End: 2019-09-25 | Stop reason: HOSPADM

## 2019-09-25 RX ORDER — ONDANSETRON 4 MG/1
4 TABLET, ORALLY DISINTEGRATING ORAL EVERY 6 HOURS PRN
Status: DISCONTINUED | OUTPATIENT
Start: 2019-09-25 | End: 2019-09-29 | Stop reason: HOSPADM

## 2019-09-25 RX ORDER — LIDOCAINE 40 MG/G
CREAM TOPICAL
Status: DISCONTINUED | OUTPATIENT
Start: 2019-09-25 | End: 2019-09-25 | Stop reason: HOSPADM

## 2019-09-25 RX ORDER — NALOXONE HYDROCHLORIDE 0.4 MG/ML
.1-.4 INJECTION, SOLUTION INTRAMUSCULAR; INTRAVENOUS; SUBCUTANEOUS
Status: DISCONTINUED | OUTPATIENT
Start: 2019-09-25 | End: 2019-09-29 | Stop reason: HOSPADM

## 2019-09-25 RX ORDER — ACETAMINOPHEN 325 MG/1
650 TABLET ORAL EVERY 4 HOURS PRN
Status: DISCONTINUED | OUTPATIENT
Start: 2019-09-28 | End: 2019-09-29 | Stop reason: HOSPADM

## 2019-09-25 RX ORDER — ALBUTEROL SULFATE 90 UG/1
2 AEROSOL, METERED RESPIRATORY (INHALATION) EVERY 6 HOURS PRN
Status: DISCONTINUED | OUTPATIENT
Start: 2019-09-25 | End: 2019-09-29 | Stop reason: HOSPADM

## 2019-09-25 RX ORDER — GLYCOPYRROLATE 0.2 MG/ML
INJECTION, SOLUTION INTRAMUSCULAR; INTRAVENOUS PRN
Status: DISCONTINUED | OUTPATIENT
Start: 2019-09-25 | End: 2019-09-25

## 2019-09-25 RX ORDER — ACETAMINOPHEN 325 MG/1
975 TABLET ORAL EVERY 8 HOURS
Status: COMPLETED | OUTPATIENT
Start: 2019-09-25 | End: 2019-09-28

## 2019-09-25 RX ORDER — HYDROMORPHONE HYDROCHLORIDE 1 MG/ML
.3-.5 INJECTION, SOLUTION INTRAMUSCULAR; INTRAVENOUS; SUBCUTANEOUS EVERY 5 MIN PRN
Status: DISCONTINUED | OUTPATIENT
Start: 2019-09-25 | End: 2019-09-25 | Stop reason: HOSPADM

## 2019-09-25 RX ORDER — LIDOCAINE 40 MG/G
CREAM TOPICAL
Status: DISCONTINUED | OUTPATIENT
Start: 2019-09-25 | End: 2019-09-29 | Stop reason: HOSPADM

## 2019-09-25 RX ORDER — METOCLOPRAMIDE 5 MG/1
5 TABLET ORAL EVERY 6 HOURS PRN
Status: DISCONTINUED | OUTPATIENT
Start: 2019-09-25 | End: 2019-09-29 | Stop reason: HOSPADM

## 2019-09-25 RX ORDER — HYDROXYZINE HYDROCHLORIDE 10 MG/1
10 TABLET, FILM COATED ORAL EVERY 6 HOURS PRN
Status: DISCONTINUED | OUTPATIENT
Start: 2019-09-25 | End: 2019-09-29 | Stop reason: HOSPADM

## 2019-09-25 RX ORDER — ONDANSETRON 2 MG/ML
INJECTION INTRAMUSCULAR; INTRAVENOUS PRN
Status: DISCONTINUED | OUTPATIENT
Start: 2019-09-25 | End: 2019-09-25

## 2019-09-25 RX ORDER — NALOXONE HYDROCHLORIDE 0.4 MG/ML
.1-.4 INJECTION, SOLUTION INTRAMUSCULAR; INTRAVENOUS; SUBCUTANEOUS
Status: DISCONTINUED | OUTPATIENT
Start: 2019-09-25 | End: 2019-09-25

## 2019-09-25 RX ORDER — VANCOMYCIN HYDROCHLORIDE 1 G/20ML
INJECTION, POWDER, LYOPHILIZED, FOR SOLUTION INTRAVENOUS PRN
Status: DISCONTINUED | OUTPATIENT
Start: 2019-09-25 | End: 2019-09-25 | Stop reason: HOSPADM

## 2019-09-25 RX ORDER — FENTANYL CITRATE 50 UG/ML
INJECTION, SOLUTION INTRAMUSCULAR; INTRAVENOUS PRN
Status: DISCONTINUED | OUTPATIENT
Start: 2019-09-25 | End: 2019-09-25

## 2019-09-25 RX ORDER — FENTANYL CITRATE 50 UG/ML
25-50 INJECTION, SOLUTION INTRAMUSCULAR; INTRAVENOUS
Status: DISCONTINUED | OUTPATIENT
Start: 2019-09-25 | End: 2019-09-25 | Stop reason: HOSPADM

## 2019-09-25 RX ORDER — METOPROLOL TARTRATE 25 MG/1
25 TABLET, FILM COATED ORAL 2 TIMES DAILY
Status: DISCONTINUED | OUTPATIENT
Start: 2019-09-25 | End: 2019-09-29 | Stop reason: HOSPADM

## 2019-09-25 RX ORDER — ONDANSETRON 2 MG/ML
4 INJECTION INTRAMUSCULAR; INTRAVENOUS EVERY 30 MIN PRN
Status: DISCONTINUED | OUTPATIENT
Start: 2019-09-25 | End: 2019-09-25 | Stop reason: HOSPADM

## 2019-09-25 RX ORDER — ONDANSETRON 4 MG/1
4 TABLET, ORALLY DISINTEGRATING ORAL EVERY 30 MIN PRN
Status: DISCONTINUED | OUTPATIENT
Start: 2019-09-25 | End: 2019-09-25 | Stop reason: HOSPADM

## 2019-09-25 RX ORDER — ALBUMIN, HUMAN INJ 5% 5 %
SOLUTION INTRAVENOUS CONTINUOUS PRN
Status: DISCONTINUED | OUTPATIENT
Start: 2019-09-25 | End: 2019-09-25

## 2019-09-25 RX ORDER — AMLODIPINE BESYLATE 5 MG/1
5 TABLET ORAL DAILY
Status: DISCONTINUED | OUTPATIENT
Start: 2019-09-26 | End: 2019-09-29 | Stop reason: HOSPADM

## 2019-09-25 RX ORDER — ONDANSETRON 2 MG/ML
4 INJECTION INTRAMUSCULAR; INTRAVENOUS EVERY 6 HOURS PRN
Status: DISCONTINUED | OUTPATIENT
Start: 2019-09-25 | End: 2019-09-29 | Stop reason: HOSPADM

## 2019-09-25 RX ORDER — METOCLOPRAMIDE HYDROCHLORIDE 5 MG/ML
5 INJECTION INTRAMUSCULAR; INTRAVENOUS EVERY 6 HOURS PRN
Status: DISCONTINUED | OUTPATIENT
Start: 2019-09-25 | End: 2019-09-29 | Stop reason: HOSPADM

## 2019-09-25 RX ADMIN — CEFTRIAXONE SODIUM 2 G: 2 INJECTION, POWDER, FOR SOLUTION INTRAMUSCULAR; INTRAVENOUS at 13:01

## 2019-09-25 RX ADMIN — PROPOFOL 100 MG: 10 INJECTION, EMULSION INTRAVENOUS at 10:45

## 2019-09-25 RX ADMIN — BUPIVACAINE HYDROCHLORIDE 3 ML: 2.5 INJECTION, SOLUTION EPIDURAL; INFILTRATION; INTRACAUDAL at 10:49

## 2019-09-25 RX ADMIN — SODIUM CHLORIDE: 9 INJECTION, SOLUTION INTRAVENOUS at 21:07

## 2019-09-25 RX ADMIN — ALBUMIN HUMAN: 0.05 INJECTION, SOLUTION INTRAVENOUS at 12:39

## 2019-09-25 RX ADMIN — FENTANYL CITRATE 50 MCG: 50 INJECTION INTRAMUSCULAR; INTRAVENOUS at 16:07

## 2019-09-25 RX ADMIN — FENTANYL CITRATE 25 MCG: 50 INJECTION, SOLUTION INTRAMUSCULAR; INTRAVENOUS at 13:56

## 2019-09-25 RX ADMIN — PHENYLEPHRINE HYDROCHLORIDE 100 MCG: 10 INJECTION INTRAVENOUS at 14:34

## 2019-09-25 RX ADMIN — ACETAMINOPHEN 975 MG: 325 TABLET, FILM COATED ORAL at 20:34

## 2019-09-25 RX ADMIN — DEXAMETHASONE SODIUM PHOSPHATE 4 MG: 4 INJECTION, SOLUTION INTRAMUSCULAR; INTRAVENOUS at 11:03

## 2019-09-25 RX ADMIN — FENTANYL CITRATE 25 MCG: 50 INJECTION, SOLUTION INTRAMUSCULAR; INTRAVENOUS at 13:25

## 2019-09-25 RX ADMIN — MIDAZOLAM 1 MG: 1 INJECTION INTRAMUSCULAR; INTRAVENOUS at 10:31

## 2019-09-25 RX ADMIN — PHENYLEPHRINE HYDROCHLORIDE 50 MCG: 10 INJECTION INTRAVENOUS at 14:15

## 2019-09-25 RX ADMIN — LIDOCAINE HYDROCHLORIDE 100 MG: 20 INJECTION, SOLUTION INFILTRATION; PERINEURAL at 10:41

## 2019-09-25 RX ADMIN — HYDROMORPHONE HYDROCHLORIDE 0.5 MG: 1 INJECTION, SOLUTION INTRAMUSCULAR; INTRAVENOUS; SUBCUTANEOUS at 17:23

## 2019-09-25 RX ADMIN — PROPOFOL 200 MG: 10 INJECTION, EMULSION INTRAVENOUS at 10:41

## 2019-09-25 RX ADMIN — GLYCOPYRROLATE 0.2 MG: 0.2 INJECTION, SOLUTION INTRAMUSCULAR; INTRAVENOUS at 11:29

## 2019-09-25 RX ADMIN — SODIUM CHLORIDE, POTASSIUM CHLORIDE, SODIUM LACTATE AND CALCIUM CHLORIDE: 600; 310; 30; 20 INJECTION, SOLUTION INTRAVENOUS at 10:31

## 2019-09-25 RX ADMIN — SODIUM CHLORIDE, POTASSIUM CHLORIDE, SODIUM LACTATE AND CALCIUM CHLORIDE: 600; 310; 30; 20 INJECTION, SOLUTION INTRAVENOUS at 14:23

## 2019-09-25 RX ADMIN — FENTANYL CITRATE 25 MCG: 50 INJECTION, SOLUTION INTRAMUSCULAR; INTRAVENOUS at 14:50

## 2019-09-25 RX ADMIN — ONDANSETRON 4 MG: 2 INJECTION INTRAMUSCULAR; INTRAVENOUS at 14:39

## 2019-09-25 RX ADMIN — FENTANYL CITRATE 25 MCG: 50 INJECTION, SOLUTION INTRAMUSCULAR; INTRAVENOUS at 13:08

## 2019-09-25 RX ADMIN — SCOPALAMINE 1 PATCH: 1 PATCH, EXTENDED RELEASE TRANSDERMAL at 08:18

## 2019-09-25 RX ADMIN — FENTANYL CITRATE 25 MCG: 50 INJECTION INTRAMUSCULAR; INTRAVENOUS at 16:28

## 2019-09-25 RX ADMIN — SIMVASTATIN 20 MG: 20 TABLET, FILM COATED ORAL at 21:06

## 2019-09-25 RX ADMIN — PHENYLEPHRINE HYDROCHLORIDE 0.3 MCG/KG/MIN: 10 INJECTION INTRAVENOUS at 10:50

## 2019-09-25 RX ADMIN — Medication 5 MG: at 12:13

## 2019-09-25 RX ADMIN — SENNOSIDES AND DOCUSATE SODIUM 1 TABLET: 8.6; 5 TABLET ORAL at 21:07

## 2019-09-25 RX ADMIN — Medication 10 MG: at 11:44

## 2019-09-25 RX ADMIN — VANCOMYCIN HYDROCHLORIDE 1500 MG: 10 INJECTION, POWDER, LYOPHILIZED, FOR SOLUTION INTRAVENOUS at 13:05

## 2019-09-25 RX ADMIN — BUPIVACAINE 10 ML: 13.3 INJECTION, SUSPENSION, LIPOSOMAL INFILTRATION at 10:49

## 2019-09-25 RX ADMIN — FENTANYL CITRATE 25 MCG: 50 INJECTION, SOLUTION INTRAMUSCULAR; INTRAVENOUS at 11:18

## 2019-09-25 RX ADMIN — FENTANYL CITRATE 25 MCG: 50 INJECTION, SOLUTION INTRAMUSCULAR; INTRAVENOUS at 12:25

## 2019-09-25 RX ADMIN — FENTANYL CITRATE 50 MCG: 50 INJECTION, SOLUTION INTRAMUSCULAR; INTRAVENOUS at 15:27

## 2019-09-25 RX ADMIN — ALBUMIN HUMAN: 0.05 INJECTION, SOLUTION INTRAVENOUS at 14:38

## 2019-09-25 ASSESSMENT — ACTIVITIES OF DAILY LIVING (ADL): ADLS_ACUITY_SCORE: 13

## 2019-09-25 ASSESSMENT — MIFFLIN-ST. JEOR: SCORE: 1455.06

## 2019-09-25 NOTE — BRIEF OP NOTE
[facility], [place]    Brief Operative Note    Pre-operative diagnosis: Prosthesis Shoulder Infection  Post-operative diagnosis * No post-op diagnosis entered *  Procedure: Procedure(s):  Left Infected Total Shoulder Arthroplasty Removal, Placement of Spacer  Surgeon: Surgeon(s) and Role:     * Jorge A Desir MD - Primary     * Frederick Mcgee MD - Resident - Assisting  Anesthesia: Combined General with Block   Estimated blood loss: 1000 mL  Drains: Hemovac  Specimens:   ID Type Source Tests Collected by Time Destination   1 : left shoulder hold anaerobe for two weeks to r/o p actinomyces Tissue Shoulder ACTINOMYCES RULE OUT, ANAEROBIC BACTERIAL CULTURE, TISSUE CULTURE AEROBIC BACTERIAL Jorge A Desir MD 9/25/2019 11:20 AM    2 : left shoulder hold anaerobe for two weeks to r/o p actinomyces Tissue Shoulder ACTINOMYCES RULE OUT, ANAEROBIC BACTERIAL CULTURE, TISSUE CULTURE AEROBIC BACTERIAL Jorge A Desir MD 9/25/2019 11:32 AM    3 : left shoulder hold anaerobe for two weeks to r/o p actinomyces  and 16 S  Tissue Shoulder ACTINOMYCES RULE OUT, ANAEROBIC BACTERIAL CULTURE, TISSUE CULTURE AEROBIC Jorge A Ghosh MD 9/25/2019 11:38 AM    4 : left shoulder hold anaerobe for two weeks to r/o p actinomyces   Tissue Shoulder ACTINOMYCES RULE OUT, ANAEROBIC BACTERIAL CULTURE, TISSUE CULTURE AEROBIC Jorge A Ghosh MD 9/25/2019 12:40 PM    5 : left shoulder hold anaerobe for two weeks to r/o p actinomyces  AND 16S Tissue Shoulder ACTINOMYCES RULE OUT, ANAEROBIC BACTERIAL CULTURE, TISSUE CULTURE Jorge A Mcdaniels MD 9/25/2019 12:41 PM      Findings:   None.  Complications: None.  Implants:    Implant Name Type Inv. Item Serial No.  Lot No. LRB No. Used   BONE CEMENT PALACOS W/GENTAMICIN Cement, Bone BONE CEMENT PALACOS W/GENTAMICIN -704-01  [employer] [id number] Left 2

## 2019-09-25 NOTE — ANESTHESIA CARE TRANSFER NOTE
Patient: Donna Etienne    Procedure(s):  Left Infected Total Shoulder Arthroplasty Removal, Placement of Spacer, left humerus ORIF    Diagnosis: Prosthesis Shoulder Infection  Diagnosis Additional Information: No value filed.    Anesthesia Type:   General     Note:  Airway :Face Mask  Patient transferred to:PACU  Handoff Report: Identifed the Patient, Identified the Reponsible Provider, Reviewed the pertinent medical history, Discussed the surgical course, Reviewed Intra-OP anesthesia mangement and issues during anesthesia, Set expectations for post-procedure period and Allowed opportunity for questions and acknowledgement of understanding      Vitals: (Last set prior to Anesthesia Care Transfer)    CRNA VITALS  9/25/2019 1519 - 9/25/2019 1557      9/25/2019             NIBP:  94/62    Pulse:  102    NIBP Mean:  76    Temp:  36.7  C (98.1  F)    SpO2:  100 %    Resp Rate (observed):  18                Electronically Signed By: ARELY Lancaster P.G., CRNA  September 25, 2019  3:57 PM

## 2019-09-25 NOTE — ANESTHESIA POSTPROCEDURE EVALUATION
Anesthesia POST Procedure Evaluation    Patient: Donna Etienne   MRN:     [id number] Gender:   female   Age:    68 year old :      1950        Preoperative Diagnosis: Prosthesis Shoulder Infection   Procedure(s):  Left Infected Total Shoulder Arthroplasty Removal, Placement of Spacer, left humerus ORIF   Postop Comments: No value filed.       Anesthesia Type:  Not documented  General    Reportable Event: NO     PAIN: Uncomplicated   Sign Out status: Comfortable, Well controlled pain     PONV: No PONV   Sign Out status:  No Nausea or Vomiting     Neuro/Psych: Uneventful perioperative course   Sign Out Status: Preoperative baseline; Age appropriate mentation     Airway/Resp.: Uneventful perioperative course   Sign Out Status: Non labored breathing, age appropriate RR; Resp. Status within EXPECTED Parameters     CV: Uneventful perioperative course   Sign Out status: Appropriate BP and perfusion indices; Appropriate HR/Rhythm     Disposition:   Sign Out in:  PACU  Disposition:  Phase II; Home  Recovery Course: Uneventful  Follow-Up: Not required           Last Anesthesia Record Vitals:  CRNA VITALS  2019 1519 - 2019 1558      2019             NIBP:  94/62    Pulse:  102    NIBP Mean:  76    Temp:  36.7  C (98.1  F)    SpO2:  100 %    Resp Rate (observed):  18          Last PACU Vitals:  Vitals Value Taken Time   BP 94/62 2019  3:55 PM   Temp     Pulse 117 2019  3:55 PM   Resp 17 2019  3:57 PM   SpO2 100 % 2019  3:57 PM   Temp src     NIBP 94/62 2019  3:55 PM   Pulse 102 2019  3:55 PM   SpO2 100 % 2019  3:55 PM   Resp     Temp 36.7  C (98.1  F) 2019  3:55 PM   Ht Rate     Temp 2     Vitals shown include unvalidated device data.      Electronically Signed By: Caden eHster DO, 2019, 3:58 PM

## 2019-09-25 NOTE — CONSULTS
HOSPITALIST CONSULTATION     REQUESTING PHYSICIAN: Jorge A Desir MD    REASON FOR CONSULTATION: Evaluation, Recommendations and Co-management of Medical Comorbidities.     ASSESSMENT & PLAN :     Donna Etienne is a 68 year old female admitted on 9/25/2019 s/p following procedure:     Pre-operative diagnosis:         Prosthesis Shoulder Infection  Procedure:      Procedure(s):  Left Infected Total Shoulder Arthroplasty Removal, Placement of Spacer  Surgeon:         Surgeon(s) and Role:     * Jorge A Desir MD - Primary     * Frederick Mcgee MD - Resident - Assisting  Anesthesia:     Combined General with Block              Estimated blood loss:  1000 mL  Drains: Hemovac      Currently overall doing well. Pain controlled. Reports a small bump in the back of the head, and erythema on her forehead -noticed post procedure.  No prior history of trauma or fall.  Also reports tingling in both legs and feet.  No other focal neurological deficit    No fever. Denies cp or sob. No LH. No NV.   PMH, Pre Op eval reviewed.       #Hypertension: Controlled.  Continue home amlodipine and metoprolol with holding parameters.  Monitor blood pressure.    # Hyperlipidemia: Continue statin    #Asthma: Stable.  Continue home inhalers including albuterol, Advair.  -Monitor pulmonary status closely.   - Encourage incentive spirometry    #History of recent eye surgery: Continue home eyedrops.  Patient may use her own supply.    #Tingling both feet: Likely related to nerve compression during the  procedure.  Strength intact.  Distal pulses well felt.  No other focal deficit.  Continue to monitor.    #Bump on the back of her head suspect small hematoma, erythema forehead: ?  Unclear how it happened.  Continue to monitor.  No indication for head imaging currently.  Neuro status intact.     # Orthopedic Surgery: POD 0    Post Op Management per Primary team.     Hemodynamics: stable.  Continue on gentle IV fluids, until adequate PO. Monitor I/o.   Post op capnography per protocol.     Pain control- per Primary team .  Currently controlled.  Minimize use of narcotics as able. Consider bowel regimen with narcotics.   Encourage Incentive spirometry to prevent atelectasis.  DVT prophylaxis- per Primary team no chemical prophylaxis currently.   Activity, antibiotics, wound and/or drain care - per Primary team.  Consider infectious disease consultation as needed.  Follow-up outstanding culture data.  Anemia of acute blood loss: Pre op Hgb 13.9 . Monitor hgb for anemia of acute blood loss. Transfuse for hgb <7.0    R arm Picc access: PICC Care.     Rest per primary team.  D.w patient, RN    Thank you for the consultation. Please page with any question. Hospitalist team to follow.      Lefty Goddard MD   Hospitalist, Internal Medicine  Pager: [phone].     CHIEF COMPLAINT:     HISTORY OF PRESENT ILLNESS: Obtained from the patient and chart review including Pre op evaluation, procedure note.    68 year old year old female  with above discussed medical problems s/p above procedure admitted on 9/25/2019  for post op care and monitoring  (for further details for indication of surgery and operative note, please refer to Jorge A Desir MD note). Medicine consulted to evaluate, recommend and/or co manage medical co morbidities.   No documented hypotension, hypoxemia or other significant complications intra or post operative.   Currently: Incisional Pain controlled. Denies any chest pain, shortness of breath or LH or palpitations. Denies any nausea, vomiting or pain abdomen. No fever or chills. Denies any dysuria.  Denies any new rash.   Tingling both feet. Bump back of her head. Forehead redness.   Medical issues as discussed above.       PAST MEDICAL HISTORY:   Past Medical History:   Diagnosis Date     Asthma      DJD (degenerative joint disease)      DJD (degenerative joint disease)       Hepatitis childhood    type A     History of colon polyps      Hypercholesterolemia      Hypertension      Liver disease     Hepatits A as a child     Osteopenia      Phlebitis     post childbirth 25 years ago     PONV (postoperative nausea and vomiting)       PAST SURGICAL HISTORY:   Past Surgical History:   Procedure Laterality Date     ANGIOGRAPHY      HEART CATH     APPENDECTOMY       ARTHROPLASTY KNEE      Partial     ARTHROPLASTY KNEE      LEFT PARTIAL     C HAND/FINGER SURGERY UNLISTED      many on both     C SHOULDER SURG PROC UNLISTED  03/2017    rt. shoulder replacement     CL AFF SURGICAL PATHOLOGY       COLONOSCOPY       EXCISE MASS UPPER EXTREMITY  9/25/2012    Procedure: EXCISE MASS UPPER EXTREMITY;  Right Upper Arm Mass Excision;  Surgeon: Alla Do MD;  Location: [facility] OR     HC INCISION TENDON SHEATH FINGER       KNEE SURGERY  11/18/2017    rt. knee partical replacement     RELEASE TRIGGER FINGER      RIGHT INDEX     REPAIR TENDON FINGER(S)  7/12/2012    Procedure: REPAIR TENDON FINGER(S);  Right Index Finger Tendon Sheath Reconstruction Using Flexor  Carpi Radialis (1/2) Tendon Graft.      ;  Surgeon: Alla Do MD;  Location: [facility]     SCAPHOID/LUNATE LIGAMENT REPAIR[       SUTURE SUSPENSIONPLASTY THUMB  2/7/2012    Procedure:SUTURE SUSPENSIONPLASTY THUMB; Left Thumb Trapezial Excision Suture Suspenionplasty  ; Surgeon:ALLA DO; Location:[facility] OR     WRIST SURGERY         FH: reviewed.     Family History   Problem Relation Age of Onset     Hypertension Mother      Osteoporosis Mother      Alcoholism Brother      Alcoholism Sister      Alcoholism Paternal Grandfather         SH: reviewed.     Social History     Socioeconomic History     Marital status:      Spouse name: None     Number of children: None     Years of education: None     Highest education level: None   Occupational History     None   Social Needs     Financial resource strain: None      Food insecurity:     Worry: None     Inability: None     Transportation needs:     Medical: None     Non-medical: None   Tobacco Use     Smoking status: Never Smoker     Smokeless tobacco: Never Used   Substance and Sexual Activity     Alcohol use: Yes     Comment: very seldom     Drug use: No     Sexual activity: Yes     Partners: Male     Birth control/protection: Male Surgical     Comment: only with    Lifestyle     Physical activity:     Days per week: None     Minutes per session: None     Stress: None   Relationships     Social connections:     Talks on phone: None     Gets together: None     Attends [employer]ism service: None     Active member of club or organization: None     Attends meetings of clubs or organizations: None     Relationship status: None     Intimate partner violence:     Fear of current or ex partner: None     Emotionally abused: None     Physically abused: None     Forced sexual activity: None   Other Topics Concern     None   Social History Narrative     None       ALLERGIES:   Allergies   Allergen Reactions     Bee Pollen Shortness Of Breath     Prednisone Itching and Other (See Comments)     Swelling of the face     Animal Dander      Grass      Hydrocodone-Acetaminophen      Other reaction(s): Hives, Rash     Latex      Other reaction(s): Rash     Oxycodone-Acetaminophen Itching     Pollen Extract      Seasonal Allergies      Tramadol Nausea     Cortisone Other (See Comments)     Flushing and headache     No Clinical Screening - See Comments Nausea and Vomiting     Any kind of anesthesia     Rifampin Other (See Comments)     swelling around mouth         HOME MEDICATIONS:     Prior to Admission medications    Medication Sig Start Date End Date Taking? Authorizing Provider   Acetaminophen (TYLENOL PO) Take 650 mg by mouth every 6 hours as needed    Yes Reported, Patient   Albuterol Sulfate (VENTOLIN IN) Inhale 2 puffs into the lungs as needed.   Yes Reported, Patient   amLODIPine  (NORVASC) 5 MG tablet Take 5 mg by mouth daily  2/26/19 2/26/20 Yes Reported, Patient   Calcium Carbonate-Vitamin D (CALCIUM + D PO) Take 600 mg by mouth daily    Yes Reported, Patient   Carboxymethylcellulose Sodium (EYE DROPS OP) 1 drop two times a day into left eye   Yes Reported, Patient   desoximetasone (TOPICORT) 0.25 % external cream Apply topically daily as needed  4/15/19  Yes Reported, Patient   Fluticasone-Salmeterol (ADVAIR HFA IN) Inhale 1 puff into the lungs. 1-2 TIMES DAILY AS NEEDED   Yes Reported, Patient   metoprolol tartrate (LOPRESSOR) 25 MG tablet Take 25 mg by mouth 2 times daily  11/7/18 11/7/19 Yes Reported, Patient   Multiple Vitamin (DAILY MULTIVITAMIN PO) Take  by mouth daily.   Yes Reported, Patient   SIMVASTATIN PO Take 20 mg by mouth At Bedtime.   Yes Reported, Patient   amoxicillin (AMOXIL) 500 MG capsule TAKE 4 CAPSULES BY MOUTH 1 HOUR BEFORE DENTAL WORK 7/15/19   Reported, Patient   Naproxen Sodium (ALEVE PO) Take 220 mg by mouth 2 times daily (with meals)     Reported, Patient       CURRENT MEDICATIONS:    Current Facility-Administered Medications   Medication     [START ON 9/28/2019] acetaminophen (TYLENOL) tablet 650 mg     acetaminophen (TYLENOL) tablet 975 mg     albuterol (PROAIR HFA/PROVENTIL HFA/VENTOLIN HFA) 108 (90 Base) MCG/ACT inhaler 2 puff     [START ON 9/26/2019] amLODIPine (NORVASC) tablet 5 mg     fluticasone-salmeterol (ADVAIR) 250-50 MCG/DOSE diskus inhaler 1 puff     HYDROmorphone (DILAUDID) tablet 2-4 mg     HYDROmorphone (PF) (DILAUDID) injection 0.3-0.5 mg     hydrOXYzine (ATARAX) tablet 10 mg     lidocaine (LMX4) cream     lidocaine 1 % 0.1-1 mL     menthol (ICY HOT) 5 % patch 1 patch    And     menthol (ICY HOT) Patch in Place    And     [START ON 9/26/2019] menthol (ICY HOT) patch REMOVAL     metoclopramide (REGLAN) tablet 5 mg    Or     metoclopramide (REGLAN) injection 5 mg     metoprolol tartrate (LOPRESSOR) tablet 25 mg     naloxone (NARCAN) injection  "0.1-0.4 mg     ondansetron (ZOFRAN-ODT) ODT tab 4 mg    Or     ondansetron (ZOFRAN) injection 4 mg     senna-docusate (SENOKOT-S/PERICOLACE) 8.6-50 MG per tablet 1 tablet    Or     senna-docusate (SENOKOT-S/PERICOLACE) 8.6-50 MG per tablet 2 tablet     simvastatin (ZOCOR) tablet 20 mg     sodium chloride (PF) 0.9% PF flush 3 mL     sodium chloride (PF) 0.9% PF flush 3 mL     sodium chloride 0.9% infusion         ROS: 10 point ROS neg other than the symptoms noted above in the HPI.    PHYSICAL EXAMINATION:     /58   Pulse 100   Temp 98.1  F (36.7  C) (Axillary)   Resp 15   Ht 1.613 m (5' 3.5\")   Wt 93.8 kg (206 lb 12.7 oz)   SpO2 99%   BMI 36.06 kg/m    Temp (24hrs), Av  F (36.7  C), Min:97.7  F (36.5  C), Max:98.1  F (36.7  C)      BMI= Body mass index is 36.06 kg/m .      Intake/Output Summary (Last 24 hours) at 2019 1859  Last data filed at 2019 1833  Gross per 24 hour   Intake 2233 ml   Output 1420 ml   Net 813 ml       General: Alert, interactive, NAD.   HEENT: AT/NC except small bump back of her head (likely hematoma) and erythema forehead . Anicteric.Moist MM.    Neck: Supple. No JVD appreciated.   Heart/CVS: Normal S1 and S2. Regular.   Chest/Respi: Non labored breathing. CTA BL.   Abdomen/GI: Soft, non distended, non tender. No g/r.  Extremities/MSK: Distal pulses 2+, well perfused. L arm sling. Dressing. R arm picc. Rest per ortho.   Neuro: Alert and oriented x4. Cranial nerves II-XII are grossly intact.    Skin:  No new rash over exposed areas.       LABORATORY DATA: reviewed.     Recent Results (from the past 24 hour(s))   Glucose by meter    Collection Time: 19  7:30 AM   Result Value Ref Range    Glucose 97 70 - 99 mg/dL   ABO/Rh type and screen    Collection Time: 19  8:30 AM   Result Value Ref Range    ABO A     RH(D) Neg     Antibody Screen Neg     Test Valid Only At          [facility]    Specimen Expires 2019 "    Actinomyces rule out    Collection Time: 09/25/19 11:20 AM   Result Value Ref Range    Specimen Description Left Shoulder Tissue SPECIMEN 1     Special Requests       Rule out Propionibacterium acnes  Received in anaerobic tubes.      Culture Micro       Canceled, Test credited  Test canceled by physician  DR. LYN DE     Anaerobic bacterial culture    Collection Time: 09/25/19 11:20 AM   Result Value Ref Range    Specimen Description Left Shoulder Tissue SPECIMEN 1     Special Requests       Rule out Propionibacterium acnes  Received in anaerobic tubes.      Culture Micro PENDING    Actinomyces rule out    Collection Time: 09/25/19 11:32 AM   Result Value Ref Range    Specimen Description Left Shoulder Tissue SPECIMEN 2     Special Requests       Rule out Propionibacterium acnes  Received in anaerobic tubes.      Culture Micro       Canceled, Test credited  Test canceled by physician  DR. LYN DE     Anaerobic bacterial culture    Collection Time: 09/25/19 11:32 AM   Result Value Ref Range    Specimen Description Left Shoulder Tissue SPECIMEN 2     Special Requests       Rule out Propionibacterium acnes  Received in anaerobic tubes.      Culture Micro PENDING    Actinomyces rule out    Collection Time: 09/25/19 11:39 AM   Result Value Ref Range    Specimen Description Left Shoulder Tissue SPECIMEN 1     Special Requests       Rule out Propionibacterium acnes  Received in anaerobic tubes.      Culture Micro       Canceled, Test credited  Test canceled by physician  DR. LYN DE     Anaerobic bacterial culture    Collection Time: 09/25/19 11:39 AM   Result Value Ref Range    Specimen Description Left Shoulder Tissue SPECIMEN 3     Special Requests       Rule out Propionibacterium acnes  Received in anaerobic tubes.      Culture Micro PENDING    Tissue Culture Aerobic Bacterial    Collection Time: 09/25/19 11:39 AM   Result Value Ref Range    Specimen Description Left Shoulder Tissue  SPECIMEN 3     Culture Micro PENDING    Actinomyces rule out    Collection Time: 09/25/19 12:40 PM   Result Value Ref Range    Specimen Description Left Shoulder Tissue SPECIMEN 4     Special Requests       Rule out Propionibacterium acnes  Received in anaerobic tubes.      Culture Micro       Canceled, Test credited  Test canceled by physician  DR. LYN DE     Anaerobic bacterial culture    Collection Time: 09/25/19 12:40 PM   Result Value Ref Range    Specimen Description Left Shoulder Tissue SPECIMEN 4     Special Requests       Rule out Propionibacterium acnes  Received in anaerobic tubes.      Culture Micro PENDING    Actinomyces rule out    Collection Time: 09/25/19 12:41 PM   Result Value Ref Range    Specimen Description Left Shoulder Tissue SPECIMEN 5     Special Requests       Rule out Propionibacterium acnes  Received in anaerobic tubes.      Culture Micro       Canceled, Test credited  Test canceled by physician  DR. LYN DE     Anaerobic bacterial culture    Collection Time: 09/25/19 12:41 PM   Result Value Ref Range    Specimen Description Left Shoulder Tissue SPECIMEN 5     Special Requests       Rule out Propionibacterium acnes  Received in anaerobic tubes.      Culture Micro PENDING        Recent Results (from the past 24 hour(s))   POC US Guidance Needle Placement    Impression    L interscalene block   XR Surgery ALYSSA Fluoro L/T 5 Min    Narrative    This exam was marked as non-reportable because it will not be read by a   radiologist or a [facility] non-radiologist provider.                     Lefty Goddard MD

## 2019-09-25 NOTE — OR NURSING
PACU to Inpatient Nursing Handoff    Patient Donna Etienne is a 68 year old female who speaks English.   Procedure Procedure(s):  Left Infected Total Shoulder Arthroplasty Removal, Placement of Spacer, left humerus ORIF   Surgeon(s) Primary: Jorge A Desir MD  Resident - Assisting: Frederick Mcgee MD     Allergies   Allergen Reactions     Bee Pollen Shortness Of Breath     Prednisone Itching and Other (See Comments)     Swelling of the face     Animal Dander      Grass      Hydrocodone-Acetaminophen      Other reaction(s): Hives, Rash     Latex      Other reaction(s): Rash     Oxycodone-Acetaminophen Itching     Pollen Extract      Seasonal Allergies      Tramadol Nausea     Cortisone Other (See Comments)     Flushing and headache     No Clinical Screening - See Comments Nausea and Vomiting     Any kind of anesthesia     Rifampin Other (See Comments)     swelling around mouth       Isolation  [unfilled]     Past Medical History   has a past medical history of Asthma, DJD (degenerative joint disease), DJD (degenerative joint disease), Hepatitis (childhood), History of colon polyps, Hypercholesterolemia, Hypertension, Liver disease, Osteopenia, Phlebitis, and PONV (postoperative nausea and vomiting). She also has no past medical history of Malignant hyperthermia. Bilateral partial knees, bilateral total shoulders.    Anesthesia Combined General with Block   Dermatome Level     Preop Meds scopolamine patch   Nerve block Interscalene.  Location:left. Med:bupivacaine. Time given: 1530   Intraop Meds dexamethasone (Decadron)  fentanyl (Sublimaze): 200 mcg total  ondansetron (Zofran): last given at 1439   Local Meds No   Antibiotics vancomycin (Vancocin) - last given at 1500  Rocephin 1301 - last given at 1301     Pain Patient Currently in Pain: yes  Comfort: comfortably manageable  Pain Control: partially effective   PACU meds  fentanyl (Sublimaze): 75 mcg (total dose) last given at 1645    hydromorphone (Dilaudid): 0.5 mg (total dose) last given at 1725    PCA / epidural No   Capnography  yes   Telemetry ECG Rhythm: Sinus rhythm   Inpatient Telemetry Monitor Ordered? No        Labs Glucose Lab Results   Component Value Date    GLC 99 07/12/2012       Hgb No results found for: HGB    INR No results found for: INR   PACU Imaging Not applicable     Wound/Incision Incision/Surgical Site 02/07/12 Left Thumb (Active)   Number of days: 2787       Incision/Surgical Site 07/12/12 Right (Active)   Number of days: 2631       Incision/Surgical Site 09/25/12 Right;Upper Arm (Active)   Number of days: 2556       Incision/Surgical Site 09/25/19 Left Arm (Active)   Incision Assessment UTV 9/25/2019  4:00 PM   Closure Adhesive strip(s) 9/25/2019  3:16 PM   Incision Drainage Amount None 9/25/2019  4:00 PM   Dressing Intervention Clean, dry, intact 9/25/2019  4:00 PM   Number of days: 0      CMS        Equipment ice pack and shoulder sling   Other LDA       IV Access Peripheral IV 09/25/12 Left Hand (Active)   Number of days: 2556       PICC Single Lumen 09/24/19 Right Basilic (Active)   Site Assessment WDL 9/25/2019  4:47 PM   Line Status Infusing 9/25/2019  4:47 PM   Dressing Intervention Gauze;Transparent;Dressing reinforced 9/25/2019  4:00 PM   Number of days: 1      Blood Products Not applicable EBL 1000 mL   Intake/Output Date 09/25/19 0700 - 09/26/19 0659   Shift 9435-6818 6317-5419 2469-8318 24 Hour Total   INTAKE   P.O.  200  200   I.V. 1000 433  1433   Colloid 250 250  500   Shift Total(mL/kg) 1250(13.33) 883(9.41)  2133(22.74)   OUTPUT   Urine  200  200   Drains  120  120   Blood 1000   1000   Shift Total(mL/kg) 1000(10.66) 320(3.41)  1320(14.07)   Weight (kg) 93.8 93.8 93.8 93.8      Drains / Martinez Closed/Suction Drain 1 Right Shoulder Accordion 10 Panamanian (Active)   Site Description UTV 9/25/2019  5:00 PM   Dressing Status Normal: Clean, Dry & Intact 9/25/2019  5:00 PM   Drainage Appearance Bloody/Bright  Red 9/25/2019  4:47 PM   Output (ml) 120 ml 9/25/2019  4:00 PM   Number of days: 0      Time of void PreOp Void Prior to Procedure: 0700 (09/25/19 0752)    PostOp      Diapered? No   Bladder Scan  Martinez cath until 1550 removed in O R    mL (09/25/19 1700)  tolerating sips     Vitals    B/P: (!) 82/72  T: 98.1  F (36.7  C)    Temp src: Oral  P:  Pulse: 96 (09/25/19 1700)    Heart Rate: 86 (09/25/19 1700)     R: 15  O2:  SpO2: 96 %    O2 Device: Nasal cannula (09/25/19 1700)    Oxygen Delivery: 2 LPM (09/25/19 1700)         Family/support present significant other   Patient belongings     Patient transported on cart   DC meds/scripts (obs/outpt) Not applicable   Inpatient Pain Meds Released? Yes       Special needs/considerations IM consult, very pleasant   Tasks needing completion CT scan tomorrow       Chapincito Rae RN  ASCOM [id number]

## 2019-09-25 NOTE — OR NURSING
Pt c/o urge to urinate. Was just straight cathed in OR for 200mls urine. Bed pan placed per pt request.

## 2019-09-25 NOTE — ANESTHESIA PREPROCEDURE EVALUATION
Anesthesia Pre-Procedure Evaluation    Patient: Donna Etienne   MRN:     [id number] Gender:   female   Age:    68 year old :      1950        Preoperative Diagnosis: Prosthesis Shoulder Infection   Procedure(s):  Left Infected Total Shoulder Arthroplasty Removal, Placement of Spacer     Past Medical History:   Diagnosis Date     Asthma      DJD (degenerative joint disease)      DJD (degenerative joint disease)      Hepatitis childhood    type A     History of colon polyps      Hypercholesterolemia      Hypertension      Liver disease     Hepatits A as a child     Osteopenia      Phlebitis     post childbirth 25 years ago     PONV (postoperative nausea and vomiting)       Past Surgical History:   Procedure Laterality Date     ANGIOGRAPHY      HEART CATH     APPENDECTOMY       ARTHROPLASTY KNEE      Partial     ARTHROPLASTY KNEE      LEFT PARTIAL     C HAND/FINGER SURGERY UNLISTED      many on both     C SHOULDER SURG PROC UNLISTED  2017    rt. shoulder replacement     CL AFF SURGICAL PATHOLOGY       COLONOSCOPY       EXCISE MASS UPPER EXTREMITY  2012    Procedure: EXCISE MASS UPPER EXTREMITY;  Right Upper Arm Mass Excision;  Surgeon: Alla Do MD;  Location: [facility]     HC INCISION TENDON SHEATH FINGER       KNEE SURGERY  2017    rt. knee partical replacement     RELEASE TRIGGER FINGER      RIGHT INDEX     REPAIR TENDON FINGER(S)  2012    Procedure: REPAIR TENDON FINGER(S);  Right Index Finger Tendon Sheath Reconstruction Using Flexor  Carpi Radialis (1/2) Tendon Graft.      ;  Surgeon: Alla Do MD;  Location: [facility]     SCAPHOID/LUNATE LIGAMENT REPAIR[       SUTURE SUSPENSIONPLASTY THUMB  2012    Procedure:SUTURE SUSPENSIONPLASTY THUMB; Left Thumb Trapezial Excision Suture Suspenionplasty  ; Surgeon:ALLA DO; Location:[facility]     WRIST SURGERY            Anesthesia Evaluation     . Pt has had prior anesthetic. Type: General    History of  anesthetic complications   - PONV        ROS/MED HX    ENT/Pulmonary:     (+)WENDY risk factors hypertension, obese, Mild Persistent asthma Treatment: Inhaled steroids and Inhaler prn,  , . .    Neurologic:  - neg neurologic ROS     Cardiovascular:     (+) Dyslipidemia, hypertension----. : . . . :. . Previous cardiac testing date:results:date: results:ECG reviewed date: results:NSR   date: results:          METS/Exercise Tolerance:  4 - Raking leaves, gardening   Hematologic:  - neg hematologic  ROS       Musculoskeletal:   (+) arthritis,  other musculoskeletal- Infected shoulder      GI/Hepatic:     (+) GERD Asymptomatic on medication,       Renal/Genitourinary:  - ROS Renal section negative       Endo:  - neg endo ROS       Psychiatric:  - neg psychiatric ROS       Infectious Disease:  - neg infectious disease ROS       Malignancy:      - no malignancy   Other:    (+) No chance of pregnancy no H/O Chronic Pain,                       PHYSICAL EXAM:   Mental Status/Neuro: A/A/O; Age Appropriate   Airway: Facies: Feasible  Mallampati: I  Mouth/Opening: Full  TM distance: > 6 cm  Neck ROM: Full   Respiratory: Auscultation: CTAB     Resp. Rate: Normal     Resp. Effort: Normal      CV: Rhythm: Regular  Rate: Age appropriate  Heart: Normal Sounds  Edema: None   Comments:      Dental: Normal Dentition                LABS:  CBC: No results found for: WBC, HGB, HCT, PLT  BMP:   Lab Results   Component Value Date     07/12/2012    POTASSIUM 4.8 07/12/2012    CHLORIDE 105 07/12/2012    CO2 28 07/12/2012    BUN 18 07/12/2012    CR 0.70 07/12/2012    GLC 99 07/12/2012     COAGS: No results found for: PTT, INR, FIBR  POC: No results found for: BGM, HCG, HCGS  OTHER:   Lab Results   Component Value Date    MARKELL 9.5 07/12/2012        Preop Vitals    BP Readings from Last 3 Encounters:   09/25/19 (!) 140/86   09/11/19 123/72   09/25/12 104/69    Pulse Readings from Last 3 Encounters:   09/11/19 81   05/02/12 94      Resp  "Readings from Last 3 Encounters:   09/25/19 16   09/25/12 16   07/12/12 13    SpO2 Readings from Last 3 Encounters:   09/25/19 98%   09/11/19 97%   09/25/12 94%      Temp Readings from Last 1 Encounters:   09/25/19 36.6  C (97.9  F) (Oral)    Ht Readings from Last 1 Encounters:   09/25/19 1.613 m (5' 3.5\")      Wt Readings from Last 1 Encounters:   09/25/19 93.8 kg (206 lb 12.7 oz)    Estimated body mass index is 36.06 kg/m  as calculated from the following:    Height as of this encounter: 1.613 m (5' 3.5\").    Weight as of this encounter: 93.8 kg (206 lb 12.7 oz).     LDA:  Peripheral IV 09/25/12 Left Hand (Active)   Number of days: 2556       PICC Single Lumen 09/24/19 Right Basilic (Active)   Number of days: 1        Assessment:   ASA SCORE: 3    H&P: History and physical reviewed and following examination; no interval change.   Smoking Status:  Non-Smoker/Unknown   NPO Status: NPO Appropriate     Plan:   Anes. Type:  General   Pre-Medication: None   Induction:  IV (Standard)   Airway: ETT; Oral   Access/Monitoring: PIV; A-Line; 2nd PIV   Maintenance: Balanced     Postop Plan:   Postop Pain: Opioids  Postop Sedation/Airway: Not planned  Disposition: Inpatient/Admit     PONV Management:   Adult Risk Factors: Female, H/o PONV or Motion Sickness, Non-Smoker, Postop Opioids   Prevention: Ondansetron, Dexamethasone, Scopolamine     CONSENT: Direct conversation   Plan and risks discussed with: Patient   Blood Products: Consented (ALL Blood Products)       Comments for Plan/Consent:  PICC in RUE.                  Sarah Wachter, MD  "

## 2019-09-25 NOTE — PROVIDER NOTIFICATION
Pt notified radiology at [facility] of bloody drainage on gauze under tegaderm.  Per patient, she is to leave dressing intact unless drainage oozes out of tegaderm.

## 2019-09-26 ENCOUNTER — APPOINTMENT (OUTPATIENT)
Dept: CT IMAGING | Facility: CLINIC | Age: 69
DRG: 493 | End: 2019-09-26
Attending: NURSE PRACTITIONER
Payer: COMMERCIAL

## 2019-09-26 ENCOUNTER — APPOINTMENT (OUTPATIENT)
Dept: GENERAL RADIOLOGY | Facility: CLINIC | Age: 69
DRG: 493 | End: 2019-09-26
Attending: NURSE PRACTITIONER
Payer: COMMERCIAL

## 2019-09-26 ENCOUNTER — APPOINTMENT (OUTPATIENT)
Dept: OCCUPATIONAL THERAPY | Facility: CLINIC | Age: 69
DRG: 493 | End: 2019-09-26
Attending: ORTHOPAEDIC SURGERY
Payer: COMMERCIAL

## 2019-09-26 LAB
ABO + RH BLD: NORMAL
ABO + RH BLD: NORMAL
ALBUMIN SERPL-MCNC: 2.2 G/DL (ref 3.4–5)
ALP SERPL-CCNC: 53 U/L (ref 40–150)
ALT SERPL W P-5'-P-CCNC: 14 U/L (ref 0–50)
ANION GAP SERPL CALCULATED.3IONS-SCNC: 6 MMOL/L (ref 3–14)
AST SERPL W P-5'-P-CCNC: 19 U/L (ref 0–45)
BACTERIA SPEC CULT: NORMAL
BILIRUB DIRECT SERPL-MCNC: 0.1 MG/DL (ref 0–0.2)
BILIRUB SERPL-MCNC: 0.3 MG/DL (ref 0.2–1.3)
BLD GP AB SCN SERPL QL: NORMAL
BLD PROD TYP BPU: NORMAL
BLD PROD TYP BPU: NORMAL
BLD UNIT ID BPU: 0
BLOOD BANK CMNT PATIENT-IMP: NORMAL
BLOOD PRODUCT CODE: NORMAL
BPU ID: NORMAL
BUN SERPL-MCNC: 21 MG/DL (ref 7–30)
CA-I SERPL ISE-MCNC: 4.3 MG/DL (ref 4.4–5.2)
CALCIUM SERPL-MCNC: 6 MG/DL (ref 8.5–10.1)
CHLORIDE SERPL-SCNC: 116 MMOL/L (ref 94–109)
CO2 SERPL-SCNC: 21 MMOL/L (ref 20–32)
CREAT SERPL-MCNC: 0.87 MG/DL (ref 0.52–1.04)
ERYTHROCYTE [DISTWIDTH] IN BLOOD BY AUTOMATED COUNT: 12 % (ref 10–15)
GFR SERPL CREATININE-BSD FRML MDRD: 69 ML/MIN/{1.73_M2}
GLUCOSE SERPL-MCNC: 101 MG/DL (ref 70–99)
HCT VFR BLD AUTO: 22.3 % (ref 35–47)
HGB BLD-MCNC: 7.4 G/DL (ref 11.7–15.7)
Lab: NORMAL
MAGNESIUM SERPL-MCNC: 1.5 MG/DL (ref 1.6–2.3)
MCH RBC QN AUTO: 31.4 PG (ref 26.5–33)
MCHC RBC AUTO-ENTMCNC: 33.2 G/DL (ref 31.5–36.5)
MCV RBC AUTO: 95 FL (ref 78–100)
NUM BPU REQUESTED: 1
PLATELET # BLD AUTO: 125 10E9/L (ref 150–450)
POTASSIUM SERPL-SCNC: 3.8 MMOL/L (ref 3.4–5.3)
PROT SERPL-MCNC: 4.3 G/DL (ref 6.8–8.8)
RBC # BLD AUTO: 2.36 10E12/L (ref 3.8–5.2)
SODIUM SERPL-SCNC: 143 MMOL/L (ref 133–144)
SPECIMEN EXP DATE BLD: NORMAL
SPECIMEN SOURCE: NORMAL
TRANSFUSION STATUS PATIENT QL: NORMAL
TRANSFUSION STATUS PATIENT QL: NORMAL
WBC # BLD AUTO: 7.2 10E9/L (ref 4–11)

## 2019-09-26 PROCEDURE — 25000132 ZZH RX MED GY IP 250 OP 250 PS 637: Performed by: NURSE PRACTITIONER

## 2019-09-26 PROCEDURE — 80048 BASIC METABOLIC PNL TOTAL CA: CPT | Performed by: INTERNAL MEDICINE

## 2019-09-26 PROCEDURE — 80076 HEPATIC FUNCTION PANEL: CPT | Performed by: INTERNAL MEDICINE

## 2019-09-26 PROCEDURE — 25000128 H RX IP 250 OP 636: Performed by: NURSE PRACTITIONER

## 2019-09-26 PROCEDURE — 25000132 ZZH RX MED GY IP 250 OP 250 PS 637: Performed by: INTERNAL MEDICINE

## 2019-09-26 PROCEDURE — 99233 SBSQ HOSP IP/OBS HIGH 50: CPT | Performed by: INTERNAL MEDICINE

## 2019-09-26 PROCEDURE — 12000001 ZZH R&B MED SURG/OB UMMC

## 2019-09-26 PROCEDURE — 25000128 H RX IP 250 OP 636: Performed by: ORTHOPAEDIC SURGERY

## 2019-09-26 PROCEDURE — P9016 RBC LEUKOCYTES REDUCED: HCPCS | Performed by: ANESTHESIOLOGY

## 2019-09-26 PROCEDURE — 97535 SELF CARE MNGMENT TRAINING: CPT | Mod: GO | Performed by: OCCUPATIONAL THERAPIST

## 2019-09-26 PROCEDURE — 25000132 ZZH RX MED GY IP 250 OP 250 PS 637: Performed by: ORTHOPAEDIC SURGERY

## 2019-09-26 PROCEDURE — 36592 COLLECT BLOOD FROM PICC: CPT | Performed by: INTERNAL MEDICINE

## 2019-09-26 PROCEDURE — 73200 CT UPPER EXTREMITY W/O DYE: CPT | Mod: LT

## 2019-09-26 PROCEDURE — 25000125 ZZHC RX 250: Performed by: INTERNAL MEDICINE

## 2019-09-26 PROCEDURE — 99207 ZZC CDG-MDM COMPONENT: MEETS MODERATE - UP CODED: CPT | Performed by: INTERNAL MEDICINE

## 2019-09-26 PROCEDURE — 82330 ASSAY OF CALCIUM: CPT | Performed by: INTERNAL MEDICINE

## 2019-09-26 PROCEDURE — 99207 ZZC CONSULT E&M CHANGED TO SUBSEQUENT LEVEL: CPT | Performed by: INTERNAL MEDICINE

## 2019-09-26 PROCEDURE — 85027 COMPLETE CBC AUTOMATED: CPT | Performed by: INTERNAL MEDICINE

## 2019-09-26 PROCEDURE — 99232 SBSQ HOSP IP/OBS MODERATE 35: CPT | Performed by: INTERNAL MEDICINE

## 2019-09-26 PROCEDURE — 97165 OT EVAL LOW COMPLEX 30 MIN: CPT | Mod: GO | Performed by: OCCUPATIONAL THERAPIST

## 2019-09-26 PROCEDURE — L3980 UP EXT FX ORTHOS HUMERAL NOS: HCPCS

## 2019-09-26 PROCEDURE — 25800030 ZZH RX IP 258 OP 636: Performed by: ORTHOPAEDIC SURGERY

## 2019-09-26 PROCEDURE — 83735 ASSAY OF MAGNESIUM: CPT | Performed by: INTERNAL MEDICINE

## 2019-09-26 PROCEDURE — 73060 X-RAY EXAM OF HUMERUS: CPT | Mod: LT

## 2019-09-26 RX ORDER — SODIUM CHLORIDE 9 MG/ML
INJECTION, SOLUTION INTRAVENOUS
Status: DISPENSED
Start: 2019-09-26 | End: 2019-09-26

## 2019-09-26 RX ORDER — FLUORIDE TOOTHPASTE
10 TOOTHPASTE DENTAL 4 TIMES DAILY
Status: DISCONTINUED | OUTPATIENT
Start: 2019-09-26 | End: 2019-09-29 | Stop reason: HOSPADM

## 2019-09-26 RX ORDER — CEFTRIAXONE 2 G/1
2 INJECTION, POWDER, FOR SOLUTION INTRAMUSCULAR; INTRAVENOUS EVERY 24 HOURS
Status: DISCONTINUED | OUTPATIENT
Start: 2019-09-26 | End: 2019-09-29 | Stop reason: HOSPADM

## 2019-09-26 RX ORDER — CEFTRIAXONE 2 G/1
2 INJECTION, POWDER, FOR SOLUTION INTRAMUSCULAR; INTRAVENOUS
Status: DISCONTINUED | OUTPATIENT
Start: 2019-09-26 | End: 2019-09-26

## 2019-09-26 RX ORDER — HYDROMORPHONE HYDROCHLORIDE 2 MG/1
4-6 TABLET ORAL EVERY 4 HOURS PRN
Status: DISCONTINUED | OUTPATIENT
Start: 2019-09-26 | End: 2019-09-29 | Stop reason: HOSPADM

## 2019-09-26 RX ORDER — SODIUM CHLORIDE 9 MG/ML
INJECTION, SOLUTION INTRAVENOUS
Status: DISPENSED
Start: 2019-09-26 | End: 2019-09-27

## 2019-09-26 RX ADMIN — ACETAMINOPHEN 975 MG: 325 TABLET, FILM COATED ORAL at 03:24

## 2019-09-26 RX ADMIN — HYDROMORPHONE HYDROCHLORIDE 2 MG: 2 TABLET ORAL at 03:24

## 2019-09-26 RX ADMIN — HYDROMORPHONE HYDROCHLORIDE 2 MG: 2 TABLET ORAL at 08:09

## 2019-09-26 RX ADMIN — HYDROMORPHONE HYDROCHLORIDE 2 MG: 2 TABLET ORAL at 09:21

## 2019-09-26 RX ADMIN — CEFTRIAXONE SODIUM 2 G: 2 INJECTION, POWDER, FOR SOLUTION INTRAMUSCULAR; INTRAVENOUS at 14:53

## 2019-09-26 RX ADMIN — METOPROLOL TARTRATE 25 MG: 25 TABLET, FILM COATED ORAL at 21:31

## 2019-09-26 RX ADMIN — SIMVASTATIN 20 MG: 20 TABLET, FILM COATED ORAL at 21:31

## 2019-09-26 RX ADMIN — AMLODIPINE BESYLATE 5 MG: 5 TABLET ORAL at 08:09

## 2019-09-26 RX ADMIN — ACETAMINOPHEN 975 MG: 325 TABLET, FILM COATED ORAL at 20:36

## 2019-09-26 RX ADMIN — SODIUM CHLORIDE: 9 INJECTION, SOLUTION INTRAVENOUS at 06:46

## 2019-09-26 RX ADMIN — ACETAMINOPHEN 975 MG: 325 TABLET, FILM COATED ORAL at 11:17

## 2019-09-26 RX ADMIN — METOPROLOL TARTRATE 25 MG: 25 TABLET, FILM COATED ORAL at 08:09

## 2019-09-26 RX ADMIN — Medication 2 G: at 13:36

## 2019-09-26 RX ADMIN — HYDROMORPHONE HYDROCHLORIDE 0.3 MG: 1 INJECTION, SOLUTION INTRAMUSCULAR; INTRAVENOUS; SUBCUTANEOUS at 17:51

## 2019-09-26 RX ADMIN — HYDROMORPHONE HYDROCHLORIDE 4 MG: 2 TABLET ORAL at 11:17

## 2019-09-26 RX ADMIN — Medication 10 ML: at 16:42

## 2019-09-26 RX ADMIN — HYDROMORPHONE HYDROCHLORIDE 6 MG: 2 TABLET ORAL at 21:30

## 2019-09-26 RX ADMIN — HYDROMORPHONE HYDROCHLORIDE 0.5 MG: 1 INJECTION, SOLUTION INTRAMUSCULAR; INTRAVENOUS; SUBCUTANEOUS at 13:48

## 2019-09-26 RX ADMIN — Medication 10 ML: at 13:15

## 2019-09-26 RX ADMIN — HYDROMORPHONE HYDROCHLORIDE 6 MG: 2 TABLET ORAL at 16:40

## 2019-09-26 RX ADMIN — HYDROXYZINE HYDROCHLORIDE 10 MG: 10 TABLET ORAL at 20:36

## 2019-09-26 ASSESSMENT — ACTIVITIES OF DAILY LIVING (ADL)
ADLS_ACUITY_SCORE: 15
ADLS_ACUITY_SCORE: 13
ADLS_ACUITY_SCORE: 15
ADLS_ACUITY_SCORE: 13

## 2019-09-26 NOTE — PROGRESS NOTES
09/26/19 0824   Quick Adds   Type of Visit Initial Occupational Therapy Evaluation   Living Environment   Lives With child(radha), adult;grandchild(radha);spouse   Living Arrangements house   Home Accessibility stairs to enter home   Number of Stairs, Main Entrance 4   Transportation Anticipated car, drives self;family or friend will provide   Living Environment Comment Bedroom on main level; tub without shower   Self-Care   Usual Activity Tolerance good   Current Activity Tolerance moderate   Equipment Currently Used at Home none   Activity/Exercise/Self-Care Comment Patient independent in ADLs/mobility without AE; spongebathes as does not have shower   Functional Level   Ambulation 0-->independent   Transferring 0-->independent   Toileting 0-->independent   Bathing 0-->independent   Dressing 0-->independent   Eating 0-->independent   Communication 0-->understands/communicates without difficulty   Swallowing 0-->swallows foods/liquids without difficulty   Cognition 0 - no cognition issues reported   Fall history within last six months no   Prior Functional Level Comment Patient independent in ADLs/mobility without AE; spongebathes as does not have shower   General Information   Onset of Illness/Injury or Date of Surgery - Date 09/25/19   Referring Physician Dr. Desir   Patient/Family Goals Statement return home to baseline   Additional Occupational Profile Info/Pertinent History of Current Problem POD #1 L TSA removal of implant, spacer placement, humerus fracture   Precautions/Limitations other (see comments)  (No AROM/PROM of L shoulder)   Weight-Bearing Status - LUE nonweight-bearing   General Observations On RA, alert   Cognitive Status Examination   Cognitive Comment No cognitive concerns   Sensory Examination   Sensory Comments Some numbness still in L UE; bilateral LE numb (per patient MD feels it was seated position during surgery)   Pain Assessment   Patient Currently in Pain Yes, see Vital Sign flowsheet    Range of Motion (ROM)   ROM Comment R UE AROM shoulder WFL; no AROM/PROM of L shoulder   Mobility   Bed Mobility Bed mobility skill: Supine to sit   Bed Mobility Skill: Supine to Sit   Level of [place]: Supine/Sit stand-by assist   Transfer Skill: Bed to Chair/Chair to Bed   Level of [place]: Bed to Chair minimum assist (75% patients effort)   Transfer Skill: Sit to Stand   Level of [place]: Sit/Stand minimum assist (75% patients effort)   Toilet Transfer   Toilet Transfer Comments bedside commode   Transfer Skill: Toilet Transfer   Level of [place]: Toilet minimum assist (75% patients effort)   Bathing   Level of [place] unable to perform   Upper Body Dressing   Level of [place]: Dress Upper Body moderate assist (50% patients effort)   Lower Body Dressing   Level of [place]: Dress Lower Body minimum assist (75% patients effort)   Toileting   Level of [place]: Toilet stand-by assist   Grooming   Level of [place]: Grooming independent   Eating/Self Feeding   Level of [place]: Eating independent   Activities of Daily Living Analysis   Impairments Contributing to Impaired Activities of Daily Living post surgical precautions;ROM decreased;strength decreased   General Therapy Interventions   Planned Therapy Interventions ADL retraining   Clinical Impression   Criteria for Skilled Therapeutic Interventions Met yes, treatment indicated   OT Diagnosis impaired self-cares/mobility   Influenced by the following impairments pain; decreased ROM   Assessment of Occupational Performance 1-3 Performance Deficits   Identified Performance Deficits dressing, bathing, toileting   Clinical Decision Making (Complexity) Low complexity   Therapy Frequency Daily   Predicted Duration of Therapy Intervention (days/wks) 2-3 days   Anticipated Discharge Disposition Home with Assist   Risks and Benefits of Treatment have been explained. Yes   Patient, Family & other staff in agreement with  "plan of care Yes   [facility] TM \"6 Clicks\"   2016, Trustees of [facility], under license to [employer].  All rights reserved.   6 Clicks Short Forms Daily Activity Inpatient Short Form   [facility]  \"6 Clicks\" Daily Activity Inpatient Short Form   1. Putting on and taking off regular lower body clothing? 3 - A Little   2. Bathing (including washing, rinsing, drying)? 2 - A Lot   3. Toileting, which includes using toilet, bedpan or urinal? 3 - A Little   4. Putting on and taking off regular upper body clothing? 2 - A Lot   5. Taking care of personal grooming such as brushing teeth? 3 - A Little   6. Eating meals? 3 - A Little   Daily Activity Raw Score (Score out of 24.Lower scores equate to lower levels of function) 16   Total Evaluation Time   Total Evaluation Time (Minutes) 8     "

## 2019-09-26 NOTE — PLAN OF CARE
Discharge Planner OT   Patient plan for discharge: Home with assist from family.  Patient lives with spouse, adult daughter and children with main level living after 4 stairs to get into house.  Patient independent in ADLs/mobility without AE at baseline.  Current status: Patient alert and oriented, pleasant.  Patient reporting pain in L UE, back of head (has ice on head rather than shoulder) and B LE numbness (reports feeling as if standing on marshmallows).  LE numbness impacting mobility as patient needing Min A to transfer bed to bedside commode.  Assisted patient to change gown, complete UB spongebathing, provided education on sling management and HEP (only AROM of hand due to splinting today).  Barriers to return to prior living situation: Numbness of B LE impacting mobility, pain  Recommendations for discharge: Home when meeting goals  Rationale for recommendations: Daily OT for maximum independence in ADLs/mobility       Entered by: Katiana Ann 09/26/2019 9:58 AM

## 2019-09-26 NOTE — PLAN OF CARE
Pt. A&Ox4. VSS. Afebrile. Lung sounds CTA. Maintaining sats on RA. IS encouraged, achieving 1250 mL. Bowel sounds active, LBM 9/24 flatus +. PP+ DP+. CMS and neuro's are intact. Reports tingling in BLE thought to be from sitting during surgery, and tingling in L hand. Denies nausea, shortness of breath, and chest pain. LUE pain managed w/ scheduled Tylenol, prn PO Dilaudid 2mgx1, and ice applied. Voids spontaneously without difficulty in the BSC. Tolerating clears and crackers thus far. LUE incisional dressing is CDI. Hemovac in place, output 30mL overnight, new bag applied this am. Pt up with Ax1-2. PICC is patent and infusing. PCD's on BLE's. Bilateral heels are elevated off the bed. Call light is within reach, pt able to make needs known and is resting comfortably between cares Will continue to monitor.

## 2019-09-26 NOTE — PROGRESS NOTES
Patient was seen, course reviewed with nursing staff.    Patient notes good pain control left arm.    She denies cough, chest pain, shortness of breath, nausea, vomiting, abdominal pain.  She notes heaviness left arm related to nerve block.  She has numbness in both feet which she believes is secondary to prolonged sitting during surgery.  No paresthesias right arm.    Low-grade temp, vital signs stable, O2 sats mid 90s room air.  Alert, fully oriented, appears comfortable.  Lungs clear  CV regular rate and rhythm without murmurs  Abdomen soft, nontender, nondistended  Left arm in sling, no hand swelling  No lower extremity edema.  Strength lower extremities intact.    Results for KORIN HOOKS (MRN [id number]) as of 9/26/2019 15:04   Ref. Range 9/26/2019 06:45 9/26/2019 09:11   Sodium Latest Ref Range: 133 - 144 mmol/L 143    Potassium Latest Ref Range: 3.4 - 5.3 mmol/L 3.8    Chloride Latest Ref Range: 94 - 109 mmol/L 116 (H)    Carbon Dioxide Latest Ref Range: 20 - 32 mmol/L 21    Urea Nitrogen Latest Ref Range: 7 - 30 mg/dL 21    Creatinine Latest Ref Range: 0.52 - 1.04 mg/dL 0.87    GFR Estimate Latest Ref Range: >60 mL/min/1.73_m2 69    GFR Estimate If Black Latest Ref Range: >60 mL/min/1.73_m2 80    Calcium Latest Ref Range: 8.5 - 10.1 mg/dL 6.0 (L)    Anion Gap Latest Ref Range: 3 - 14 mmol/L 6    Magnesium Latest Ref Range: 1.6 - 2.3 mg/dL 1.5 (L)    Albumin Latest Ref Range: 3.4 - 5.0 g/dL 2.2 (L)    Protein Total Latest Ref Range: 6.8 - 8.8 g/dL 4.3 (L)    Bilirubin Total Latest Ref Range: 0.2 - 1.3 mg/dL 0.3    Alkaline Phosphatase Latest Ref Range: 40 - 150 U/L 53    ALT Latest Ref Range: 0 - 50 U/L 14    AST Latest Ref Range: 0 - 45 U/L 19    Bilirubin Direct Latest Ref Range: 0.0 - 0.2 mg/dL 0.1    Calcium Ionized Latest Ref Range: 4.4 - 5.2 mg/dL  4.3 (L)   Glucose Latest Ref Range: 70 - 99 mg/dL 101 (H)    WBC Latest Ref Range: 4.0 - 11.0 10e9/L 7.2    Hemoglobin Latest Ref Range: 11.7 - 15.7  g/dL 7.4 (L)    Hematocrit Latest Ref Range: 35.0 - 47.0 % 22.3 (L)    Platelet Count Latest Ref Range: 150 - 450 10e9/L 125 (L)    RBC Count Latest Ref Range: 3.8 - 5.2 10e12/L 2.36 (L)    MCV Latest Ref Range: 78 - 100 fl 95    MCH Latest Ref Range: 26.5 - 33.0 pg 31.4    MCHC Latest Ref Range: 31.5 - 36.5 g/dL 33.2    RDW Latest Ref Range: 10.0 - 15.0 % 12.0        Intra-Op cultures pending      Assessment    Status post resection infected left total shoulder arthroplasty with spacer placement..  Patient is comfortable.    Acute blood loss anemia    Asthma stable.    Hypertension, stable on amlodipine and metoprolol    Complaint of paresthesias both feet, likely secondary to nerve compression during procedure.  No motor deficits.    Plan  IV antibiotics, per ID  Transfuse 1 unit of packed red blood cells, repeat hemoglobin in a.m.  Continue current antihypertensive medications with hold parameters.

## 2019-09-26 NOTE — OP NOTE
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      POSTOPERATIVE DIAGNOSES:     1.  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   2.  Left humeral shaft fracture.      SURGICAL PROCEDURES:   1.  Left shoulder:  Removal of total shoulder arthroplasty.   2.  Left shoulder radical debridement of the bone of the humerus for osteomyelitis.   3.  Left shoulder open reduction internal fixation of humeral shaft fracture.      SURGEON:  Jorge A Desir MD      ESTIMATED BLOOD LOSS:  1 liter.      IMPLANTS REMOVED:  DePuy long stemmed cemented prosthesis with cement restrictor and glenoid component.      IMPLANTS PLACED:  Rush negrito and size 8 Biomet spacer mold with Palacos LV + G and 2 grams of vancomycin per dose.      SPECIMENS:  Five.   1.  Shoulder #1 (upon entry into the glenohumeral joint space).   2.  Shoulder #2 (from the leading edge of the supraspinatus).   3.  Shoulder #3 (underneath the humeral head of the implant).   4.  Shoulder #4 (down the humeral canal after removal of the humeral stem).   5.  Shoulder #5 (behind the glenoid).     All specimens were sent for aerobic and anaerobic with the anaerobic labeled \"please keep 2 weeks to rule out C. acnes\".  Specimen #3 and #5 were sent for C and S as requested by Infectious Disease consultation.      NOTE ON COMPLEXITY:  This patient's surgery was more complicated than usual because of the extent of the cemented implant.  It was cemented by another physician all the way to the elbow and required extensive exposure all the way down to the elbow.  This required additional time, technique, and blood loss (1 liter instead of the usual 150-250 mL).      INDICATIONS:  Ms. Etienne is a very pleasant 68-year-old woman with history of pain and disability in her shoulder.  She had previous surgery elsewhere and then a revision in a " "single stage for an infection.  This infected arthroplasty was then also infected as a result.  Consequently, she came to see me.  She had pain and disability and her preoperative evaluation that was consistent with septic arthroplasty.  After discussion of risks and benefits of surgical versus nonsurgical management, she elected to proceed with surgical remediation.      DESCRIPTION OF PROCEDURE:  The patient was positively identified in the pre-anesthesia care area, surgical site was initialed by me, and her consent was reviewed with her.  She was then taken to the operating theater.  She was placed in a well-padded beachchair position, prepped and draped in the usual sterile fashion for left upper extremity surgery.      Prior to surgical initiation, a \"time out\" was held in accordance with hospital policy.  Verbal verification that prophylactic intravenous antibiotics had been withheld pending cultures was performed.      After establishment of an anterior deltopectoral incision, I extended this down to the deltopectoral interval.  I was able to identify the space between the deltoid and the pectoralis.  The cephalic vein was not identified.  I dissected in the subdeltoid space.  There were significant sutures here, consistent with previous surgery.  As I worked through this area, I came down through the subperiosteal space and exposed the humerus.  We took the specimens in the above-mentioned order as I proceeded.  As I did this, I was able to identify the humeral head, externally rotate and deliver the humerus into the wound.  When I did this, I removed the head, but the stem was rigidly fixed.  Because of the long stemmed nature of this implant, I elected to proceed with an intentional osteotomy of the anterior humerus.  As I did so, I osteotomized the anterior humerus and windowed it.  As I did so, I was able to remove the anterior cortical piece and preserved it on the backtable.  I worked to get the " implant mobilized and as I did so, I removed all of the cement from the anterior aspect and carefully dissected the implant, the cement mantle from posteriorly and laterally and medially.  I could not get the posterior most aspect of it, however, and as I tried to remove the implant with the stem, the humeral shaft broke at the tip of the stem.  Consequently, I was able to remove it without destroying the humerus.  I copiously irrigated and then placed a Rush negrito on it.  I removed all of the cement from in the canal and then used a rongeur and curettes to remove a significant amount of residual bone; only a cortical shell was left after this.      Following this, I placed a Rush negrito.  I believe that I was distal enough.  I came forward and was able to use fluoroscopy and verified that it was not far enough distally as the cement restrictor was still in place.  Consequently, I re-exposed the distal humerus.  As I did so, I used a rongeur to remove the cement restrictor.  This required a pituitary rongeur and several drills.  As I did so, I was able to place a Rush negrito all the way down to the olecranon fossa.  I did this and then was able to get circumferential #1 PDS sutures in a racking hitch fashion.  I passed all of these in a subperiosteal fashion, protecting the neurologic structures.  As I did so, I was able to, on AP and orthogonal lateral intraoperative fluoroscopy, demonstrate that acceptable alignment had been obtained and maintained.  The wound was copiously irrigated ultimately with 2 liters of normal saline with 1 bag of the Bactisure in between.  After this, the pectoralis was reattached back to its stump and then the drain placed proximally to the axillary nerve and the axillary nerve verified to be in continuity using the tug test.      0 PDS followed by 2-0 PDS followed by 3-0 running subcuticular Monocryl were used.  Steri-Strips and a sterile nonadherent dressing were applied.  A sling was placed  and a slab.      POSTOPERATIVE PLAN:   1.  The patient will be admitted to the Orthopedic Service for intravenous followed by oral pain medications.  She should have no active or passive range of motion at this time.  She will be fitted with a Ortiz tomorrow with AP and lateral radiographs of the humerus at that time.  When she is seen back in 2 weeks, she will have AP and lateral radiographs of the humerus in her Ortiz.   2.  At the 6-week pati, her IV antibiotics will be discontinued and then at 8 weeks we will schedule arthroscopic biopsies for culture.     3.  CT scanning will determine whether or not she needs a custom orthosis for her glenoid.     4.  Assuming her cultures are negative, she will proceed with reconstruction when the final cultures after the arthroscopic biopsy are negative.         MICHELLE DE MD             D: 2019   T: 2019   MT: ALESIA      Name:     KORIN HOOKS   MRN:      [id number]        Account:        [id number]   :      1950           Procedure Date: 2019      Document: [id number]       cc: Ana Luisa Aiken MD

## 2019-09-26 NOTE — PROGRESS NOTES
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   Left humeral shaft fracture.    Tx: 1.  Left shoulder:  Removal of total shoulder arthroplasty.   2.  Left shoulder radical debridement of the bone of the humerus for osteomyelitis.   3.  Left shoulder open reduction internal fixation of humeral shaft fracture.    E: No acute events overnight.     S: Pain well controlled,     O:   /58   Pulse 100   Temp 97  F (36.1  C) (Oral)   Resp 14   Ht 1.613 m (5' 3.5\")   Wt 93.8 kg (206 lb 12.7 oz)   SpO2 96%   BMI 36.06 kg/m    Endorses bilateral foot numbness.   Drain: 400/30    Exam:  Gen: NAD, A/O x 3  Resp: Comfortable, non-labored breathing  Abd: soft, non tender  LUE:  -Wound dressed, c/d/i  -Sens: SILT m/r/u/ax  -Motor: 5/5 , io, epl, fpl  -Vasc: 2+ pulses, wwp, brisk cap refill   - bilateral feet- flexion/extension intact. wiggles    Recent Labs   Lab 09/26/19  0645 09/25/19  2148     --    POTASSIUM 3.8  --    CHLORIDE 116*  --    CO2 21  --    BUN 21  --    CR 0.87 1.26*   *  --    MAG 1.5*  --      Recent Labs   Lab 09/26/19  0645   WBC 7.2   HGB 7.4*   *       Impression: 68 year old female s/p1.  Left shoulder:  Removal of total shoulder arthroplasty.   2.  Left shoulder radical debridement of the bone of the humerus for osteomyelitis.   3.  Left shoulder open reduction internal fixation of humeral shaft fracture.   on 9-  doing well  4. Acute blood loss anemia    Plan:  - Activity: As tolerated. Sling and Ortiz brace on at all times.  Antibiotics: continue Rocephin  - DVT prophylaxis: mechanical only  - Wound Care: Aquacel leave in place  - Drain: will discontinue when output < 30 ML/shift  - Pain management: PO/PNB  - Physical Therapy:   - Occupational Therapy:NO active or passive range of motion to shoulder/ non weight bearing/ elbow/wrist okay.  Transfuse 1 unit(s) PRBC today.  - " Dispo: home: 2-3 days  - Follow up:   Future Appointments   Date Time Provider Department Center   9/26/2019  8:00 AM Katiana Ann OT [facility]   9/26/2019  1:00 PM Katiana Ann OT [facility]   9/26/2019  7:00 PM [facility] PT OVERFLOW [facility]   10/14/2019 12:30 PM Jorge A Desir MD [facility]   11/11/2019 12:40 PM Jorge A Desir MD [facility]        ARELY Piper, CNP  Department of Orthopedic Surgery  [facility]  [phone]    For any questions regarding this patient please page me at the above number prior to contacting the ortho resident on call.

## 2019-09-26 NOTE — CONSULTS
INFECTIOUS DISEASE CONSULTATION    NAME: Donna Etienne  MRN:  [id number]  AGE:  68 year old            :  1950    PRIMARY CARE PROVIDER:  Puma Joseph  Consult Requester:  Jorge A Desir*     Date of Admission:   2019  Date of Visit:  2019    REASON FOR CONSULT:  Infected shoulder replacement. Dr. 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 Aiken, continue intravenous ceftriaxone and ensure that the patient is seen by Dr. Aiken. Based upon the results of the cultures, the antibiotic choice may be modified by Dr. Aiken.    Serum creatinine:  This was notably elevated at 1.26 on 2019. Today, it has declined to 0.87.    Hypoalbuminemia:  2.2 g/dL    SUGGESTIONS:    1. Continue iv ceftriaxone 2 grams every 24 hours.   2. Contact Dr. Aiken at the time of hospital discharge so that she is updated on any culture results and can ensure that the antibiotics are appropriately modified as needed.  3. Make an appointment for the patient to be seen by Dr. Aiken in the outpatient infectious disease clinic.  4. I spoke with the patient to educate her so that she does not allow her dog access that may result in the dog licking the surgical incision site or the nearby area and that she also wash her hands following any contact with any dogs.  5.  I educated the patient, who is receiving antibiotics, on the topic of infections due to Clostridioides (formerly Clostridium) difficile. This included that if the patient develops diarrhea, especially if it is associated with fever or abdominal pain, the patient is to seek medical attention without delay. I discussed both the potential severity of the infection, including the possibility that it can be life-threatening, and that after treatment  "there may be recurrences, which may result in further damage to the colon and a yet increased rate of major complications. In addition, I discussed the need to promptly seek medical attention both while the patient is receiving antibiotics and weeks or even months after the completion of the antibiotics and to inform the physician at that time of the history of receiving antibiotics.     6. Educate the patient on hydration given her elevated creatinine on 9/25/2019  7. Consider nutritional supplementation to help in the healing as her albumin was only 2.2.    HISTORY OF PRESENT ILLNESS:      This 62-year-old woman underwent left TSA in July of 2018. She developed an infection and aspiration in November of 2018 grew Staphylococcus saccharolyticus. She was treated with vancomycin and rifampin initially, developed facial swelling felt secondary to rifampin, and completed the 6-week course of antibiotics with iv ceftriaxone. The patient underwent single stage repair of the left shoulder in December of 2018. Cultures from the surgery had no growth.    The surgical incision had delayed healing and the patient has had continued pain of the left shoulder.    On 8/26/2019 the patient was evaluated by Dr. Desir who felt that the patient had \"chronic periprosthetic left shoulder infection.\"     With respect to a second revision arthroplasty, he wrote:    \"We will plan to get her on the schedule next month and our plan for the initial surgery will be to perform an explant of her current implants, take intraoperative cultures, and reimplant a antibiotic loaded cement spacer after a thorough debridement.  We will have her obtain a PICC line the day prior to surgery and plan for 6 weeks of IV therapy after the procedure.  After completing her IV therapy she will need an antibiotic holiday and then arthroscopic biopsies to confirm infection eradication prior to definitive reimplantation.  While her antibiotic spacer is in place we " "will plan to get a CT of the shoulder for operative planning purposes in the reimplantation phase.\"    The patient was taken to the OR on 9/25/2019:    POSTOPERATIVE DIAGNOSES:     1.  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   2.  Left humeral shaft fracture.      SURGICAL PROCEDURES:   1.  Left shoulder:  Removal of total shoulder arthroplasty.   2.  Left shoulder radical debridement of the bone of the humerus for osteomyelitis.   3.  Left shoulder open reduction internal fixation of humeral shaft fracture.     Cultures were obtained in the OR that have no growth thus far. The patient is receiving iv ceftriaxone.    PAST MEDICAL HISTORY: reviewed  Past Medical History:   Diagnosis Date     Asthma      DJD (degenerative joint disease)      DJD (degenerative joint disease)      Hepatitis childhood    type A     History of colon polyps      Hypercholesterolemia      Hypertension      Liver disease     Hepatits A as a child     Osteopenia      Phlebitis     post childbirth 25 years ago     PONV (postoperative nausea and vomiting)        PAST SURGICAL HISTORY: reviewed the extensive surgical history  Bilateral cataract surgery approximately one month ago according to the patient  Past Surgical History:   Procedure Laterality Date     ANGIOGRAPHY      HEART CATH     APPENDECTOMY       ARTHROPLASTY KNEE      Partial     ARTHROPLASTY KNEE      LEFT PARTIAL     C HAND/FINGER SURGERY UNLISTED      many on both     C SHOULDER SURG PROC UNLISTED  03/2017    rt. shoulder replacement     CL AFF SURGICAL PATHOLOGY       COLONOSCOPY       EXCISE MASS UPPER EXTREMITY  9/25/2012    Procedure: EXCISE MASS UPPER EXTREMITY;  Right Upper Arm Mass Excision;  Surgeon: Roge Do MD;  Location: [facility]     HC INCISION TENDON SHEATH FINGER       KNEE SURGERY  11/18/2017    rt. knee partical replacement     RELEASE TRIGGER FINGER      RIGHT INDEX     REMOVE HARDWARE ARTHROPLASTY " SHOULDER. I&D, PLACE ANTIBIOTIC CEMENT BE Left 9/25/2019    Procedure: Left Infected Total Shoulder Arthroplasty Removal, Placement of Spacer, left humerus ORIF;  Surgeon: Jorge A Desir MD;  Location[facility]     REPAIR TENDON FINGER(S)  7/12/2012    Procedure: REPAIR TENDON FINGER(S);  Right Index Finger Tendon Sheath Reconstruction Using Flexor  Carpi Radialis (1/2) Tendon Graft.      ;  Surgeon: Alla Arriaza MD;  [facility]     SCAPHOID/LUNATE LIGAMENT REPAIR[       SUTURE SUSPENSIONPLASTY THUMB  2/7/2012    Procedure:SUTURE SUSPENSIONPLASTY THUMB; Left Thumb Trapezial Excision Suture Suspenionplasty  ; Surgeon:ALLA ARRIAZA; Location:[facility] OR     WRIST SURGERY         ALLERGIES:  Bee pollen; Prednisone; Animal dander; Grass; Hydrocodone-acetaminophen; Latex; Oxycodone-acetaminophen; Pollen extract; Seasonal allergies; Tramadol; Cortisone; No clinical screening - see comments; and Rifampin    CURRENT MEDICATIONS:  Current Facility-Administered Medications   Medication     sodium chloride 0.9 % infusion     [START ON 9/28/2019] acetaminophen (TYLENOL) tablet 650 mg     acetaminophen (TYLENOL) tablet 975 mg     albuterol (PROAIR HFA/PROVENTIL HFA/VENTOLIN HFA) 108 (90 Base) MCG/ACT inhaler 2 puff     amLODIPine (NORVASC) tablet 5 mg     artificial saliva (BIOTENE DRY MOUTHWASH) liquid 10 mL     cefTRIAXone (ROCEPHIN) 2 g vial to attach to  ml bag for ADULTS or NS 50 ml bag for PEDS     fluticasone-salmeterol (ADVAIR) 250-50 MCG/DOSE diskus inhaler 1 puff     HYDROmorphone (DILAUDID) tablet 4-6 mg     HYDROmorphone (PF) (DILAUDID) injection 0.3-0.5 mg     hydrOXYzine (ATARAX) tablet 10 mg     lidocaine (LMX4) cream     lidocaine 1 % 0.1-1 mL     menthol (ICY HOT) 5 % patch 1 patch    And     menthol (ICY HOT) Patch in Place    And     menthol (ICY HOT) patch REMOVAL     metoclopramide (REGLAN) tablet 5 mg    Or     metoclopramide (REGLAN) injection 5 mg     metoprolol tartrate  (LOPRESSOR) tablet 25 mg     naloxone (NARCAN) injection 0.1-0.4 mg     ondansetron (ZOFRAN-ODT) ODT tab 4 mg    Or     ondansetron (ZOFRAN) injection 4 mg     Prednisolone Acetate 1%-moxifloxacin 0.5%-Nepafenac 0.1% ophthalmic suspension     senna-docusate (SENOKOT-S/PERICOLACE) 8.6-50 MG per tablet 1 tablet    Or     senna-docusate (SENOKOT-S/PERICOLACE) 8.6-50 MG per tablet 2 tablet     simvastatin (ZOCOR) tablet 20 mg     sodium chloride (PF) 0.9% PF flush 3 mL     sodium chloride (PF) 0.9% PF flush 3 mL     sodium chloride 0.9 % infusion     sodium chloride 0.9% infusion       FAMILY HISTORY: reviewed  Family History   Problem Relation Age of Onset     Hypertension Mother      Osteoporosis Mother      Alcoholism Brother      Alcoholism Sister      Alcoholism Paternal Grandfather        SOCIAL HISTORY: [place] She has one dog in the home and two dogs that are with the cattle.  Social History     Socioeconomic History     Marital status:      Spouse name: Not on file     Number of children: Not on file     Years of education: Not on file     Highest education level: Not on file   Occupational History     Not on file   Social Needs     Financial resource strain: Not on file     Food insecurity:     Worry: Not on file     Inability: Not on file     Transportation needs:     Medical: Not on file     Non-medical: Not on file   Tobacco Use     Smoking status: Never Smoker     Smokeless tobacco: Never Used   Substance and Sexual Activity     Alcohol use: Yes     Comment: very seldom     Drug use: No     Sexual activity: Yes     Partners: Male     Birth control/protection: Male Surgical     Comment: only with    Lifestyle     Physical activity:     Days per week: Not on file     Minutes per session: Not on file     Stress: Not on file   Relationships     Social connections:     Talks on phone: Not on file     Gets together: Not on file     Attends Zoroastrian service: Not on  file     Active member of club or organization: Not on file     Attends meetings of clubs or organizations: Not on file     Relationship status: Not on file     Intimate partner violence:     Fear of current or ex partner: Not on file     Emotionally abused: Not on file     Physically abused: Not on file     Forced sexual activity: Not on file   Other Topics Concern     Not on file   Social History Narrative     Not on file       REVIEW OF SYSTEMS:  No fever, chills, chest pain or pressure, cough, shortness of breath, nausea, vomiting, abdominal pain, diarrhea, constipation. Positive for pain of the left shoulder. Positive for recent cataract surgery bilaterally (one month ago according to patient) with an improvement in distance vision. A 10-point ROS is otherwise negative.    LABORATORY RESULTS:    Hematology Studies    Recent Labs   Lab Test 09/26/19  0645   WBC 7.2   HGB 7.4*   MCV 95   *       Metabolic Studies     Recent Labs   Lab Test 09/26/19  0645 09/25/19  2148 07/12/12  0728     --  143   POTASSIUM 3.8  --  4.8   CHLORIDE 116*  --  105   CO2 21  --  28   BUN 21  --  18   CR 0.87 1.26* 0.70   GFRESTIMATED 69 44* 86       Hepatic Studies    Recent Labs   Lab Test 09/26/19  0645   BILITOTAL 0.3   ALKPHOS 53   ALBUMIN 2.2*   AST 19   ALT 14         Microbiology:  Culture Micro   Date Value Ref Range Status   09/25/2019   Preliminary    Canceled, Test credited  Test canceled by physician  DR. LYN DE     09/25/2019 Culture negative monitoring continues  Preliminary   09/25/2019 Culture negative monitoring continues  Preliminary   09/25/2019   Preliminary    Canceled, Test credited  Test canceled by physician  DR. LYN DE     09/25/2019 Culture negative monitoring continues  Preliminary   09/25/2019 Culture negative monitoring continues  Preliminary   09/25/2019   Preliminary    Canceled, Test credited  Test canceled by physician  DR. LYN DE     09/25/2019 Culture  negative monitoring continues  Preliminary   2019 Culture negative monitoring continues  Preliminary   2019   Final    Canceled, Test credited  Test canceled by physician  DR. LYN DE     2019 Culture negative monitoring continues  Preliminary   2019 Culture negative monitoring continues  Preliminary   2019   Preliminary    Canceled, Test credited  Test canceled by physician  DR. LYN DE     2019 Culture negative monitoring continues  Preliminary   2019 Culture negative monitoring continues  Preliminary   2010 No growth  Final   2010 Not received in an anaerobic transport container.  Final     Comment:     Unsatisfactory specimen - exposed to air  Spoke to Narendra in [facility].  Canceled, Test credited       Vitals:    19 1011 19 1045 19 1145 19 1534   BP:  118/49 127/71 123/64   BP Location:    Left leg   Pulse:  95     Resp:  16 16 16   Temp: 99.1  F (37.3  C) 98.3  F (36.8  C) 99.3  F (37.4  C) 97.7  F (36.5  C)   TempSrc: Oral Oral Oral Oral   SpO2:  93% 94% 90%   Weight:       Height:         Temp (high/low/average during past 24 hours): Temp (24hrs), Av.1  F (36.7  C), Min:96.6  F (35.9  C), Max:99.4  F (37.4  C)    PHYSICAL EXAMINATION:  Vital signs as above  General: Pleasant woman lying in bed with her left shoulder in a sling device  HEENT: Wearing eyeglasses. Swelling of the posterior head that the patient said is the result of what happened during surgery and for which she uses a cold pack on the area. EOMI. No conjunctival hemorrhage. No scleral icterus. No evidence of oral thrush on exam. Good dentition.  Lymph node exam: No submental, cervical, supraclavicular, right axillary (I could not examine for a left axillary node due to the sling), or inguinal nodes were palpable.   Neck: Supple   Back: No costovertebral angle tenderness or spinal tenderness.   Lungs: Clear to auscultation  Cardiac: RRR S1S2 without  MGR  Abdomen: Normal bowel sounds, soft, non-tender  Extremities: No cyanosis, clubbing, or edema. I was only able to examine the feet and neither calf due to the compression stockings.  Neuro: AOX3. CN II-XII intact (I did not test gag or corneal reflex). Able to move all four extremities.    Electronically signed by James Saenz MD  9/26/2019  --

## 2019-09-26 NOTE — PLAN OF CARE
"  VS: /71   Pulse 95   Temp 99.3  F (37.4  C) (Oral)   Resp 16   Ht 1.613 m (5' 3.5\")   Wt 93.8 kg (206 lb 12.7 oz)   SpO2 94%   BMI 36.06 kg/m       O2: Room air   Output: Up to commode, some hesitancy--continue to measure   Last BM: 9/25/2019   Activity: 1 assist   Up for meals? Yes   Skin: Incision, drain; bump on back of head from positioning from surgery--ice applied   Pain: PO Dilauded increased to 4-6 Q4 today; one dose give of IV dilauded   CMS: \"My feet feel like marshmallows\" Numbness and tingling in both feet as a result of positioning during surgery; some tingling left hand   Dressing: CDI   Diet: Regular (good appetite)   LDA: PICC infusing, hemovac with 60 output this shift   Equipment: Brace (not to be removed), sling, IV pole, commode   Plan: Continue to monitor   Additional Info: Magnesium and calcium were low this morning. Pt received one unit of blood and magnesium replacement. Levels to be rechecked in the morning.        "

## 2019-09-27 ENCOUNTER — APPOINTMENT (OUTPATIENT)
Dept: OCCUPATIONAL THERAPY | Facility: CLINIC | Age: 69
DRG: 493 | End: 2019-09-27
Attending: ORTHOPAEDIC SURGERY
Payer: COMMERCIAL

## 2019-09-27 LAB
ANION GAP SERPL CALCULATED.3IONS-SCNC: 4 MMOL/L (ref 3–14)
BUN SERPL-MCNC: 25 MG/DL (ref 7–30)
CALCIUM SERPL-MCNC: 7.5 MG/DL (ref 8.5–10.1)
CHLORIDE SERPL-SCNC: 109 MMOL/L (ref 94–109)
CO2 SERPL-SCNC: 26 MMOL/L (ref 20–32)
CREAT SERPL-MCNC: 0.78 MG/DL (ref 0.52–1.04)
GFR SERPL CREATININE-BSD FRML MDRD: 78 ML/MIN/{1.73_M2}
GLUCOSE SERPL-MCNC: 99 MG/DL (ref 70–99)
HGB BLD-MCNC: 8.7 G/DL (ref 11.7–15.7)
MAGNESIUM SERPL-MCNC: 2.3 MG/DL (ref 1.6–2.3)
POTASSIUM SERPL-SCNC: 4.4 MMOL/L (ref 3.4–5.3)
SODIUM SERPL-SCNC: 139 MMOL/L (ref 133–144)

## 2019-09-27 PROCEDURE — 83735 ASSAY OF MAGNESIUM: CPT | Performed by: ORTHOPAEDIC SURGERY

## 2019-09-27 PROCEDURE — 25000128 H RX IP 250 OP 636: Performed by: NURSE PRACTITIONER

## 2019-09-27 PROCEDURE — 85018 HEMOGLOBIN: CPT | Performed by: ORTHOPAEDIC SURGERY

## 2019-09-27 PROCEDURE — 99232 SBSQ HOSP IP/OBS MODERATE 35: CPT | Performed by: INTERNAL MEDICINE

## 2019-09-27 PROCEDURE — 12000001 ZZH R&B MED SURG/OB UMMC

## 2019-09-27 PROCEDURE — 80048 BASIC METABOLIC PNL TOTAL CA: CPT | Performed by: ORTHOPAEDIC SURGERY

## 2019-09-27 PROCEDURE — 25000132 ZZH RX MED GY IP 250 OP 250 PS 637: Performed by: NURSE PRACTITIONER

## 2019-09-27 PROCEDURE — 97530 THERAPEUTIC ACTIVITIES: CPT | Mod: GO | Performed by: OCCUPATIONAL THERAPIST

## 2019-09-27 PROCEDURE — 25000132 ZZH RX MED GY IP 250 OP 250 PS 637: Performed by: ORTHOPAEDIC SURGERY

## 2019-09-27 PROCEDURE — 97535 SELF CARE MNGMENT TRAINING: CPT | Mod: GO | Performed by: OCCUPATIONAL THERAPIST

## 2019-09-27 PROCEDURE — 25000132 ZZH RX MED GY IP 250 OP 250 PS 637: Performed by: INTERNAL MEDICINE

## 2019-09-27 PROCEDURE — 36415 COLL VENOUS BLD VENIPUNCTURE: CPT | Performed by: ORTHOPAEDIC SURGERY

## 2019-09-27 RX ORDER — CEFTRIAXONE 2 G/1
2 INJECTION, POWDER, FOR SOLUTION INTRAMUSCULAR; INTRAVENOUS EVERY 24 HOURS
Qty: 840 ML | Refills: 0 | Status: ON HOLD | OUTPATIENT
Start: 2019-09-27 | End: 2020-01-16

## 2019-09-27 RX ORDER — AMOXICILLIN 250 MG
1 CAPSULE ORAL 2 TIMES DAILY
Qty: 30 TABLET | Refills: 0 | Status: SHIPPED | OUTPATIENT
Start: 2019-09-27 | End: 2019-09-29

## 2019-09-27 RX ORDER — ACETAMINOPHEN 325 MG/1
TABLET ORAL
Qty: 1 BOTTLE | Refills: 0 | Status: SHIPPED | OUTPATIENT
Start: 2019-09-27 | End: 2019-09-29

## 2019-09-27 RX ORDER — CEFTRIAXONE 2 G/1
2 INJECTION, POWDER, FOR SOLUTION INTRAMUSCULAR; INTRAVENOUS EVERY 24 HOURS
Qty: 840 ML | Refills: 0 | COMMUNITY
Start: 2019-09-26 | End: 2019-09-27

## 2019-09-27 RX ORDER — TRAMADOL HYDROCHLORIDE 50 MG/1
50-100 TABLET ORAL EVERY 6 HOURS PRN
Status: DISCONTINUED | OUTPATIENT
Start: 2019-09-27 | End: 2019-09-29 | Stop reason: HOSPADM

## 2019-09-27 RX ADMIN — Medication 10 ML: at 16:51

## 2019-09-27 RX ADMIN — CEFTRIAXONE SODIUM 2 G: 2 INJECTION, POWDER, FOR SOLUTION INTRAMUSCULAR; INTRAVENOUS at 14:37

## 2019-09-27 RX ADMIN — SENNOSIDES AND DOCUSATE SODIUM 2 TABLET: 8.6; 5 TABLET ORAL at 20:39

## 2019-09-27 RX ADMIN — ACETAMINOPHEN 975 MG: 325 TABLET, FILM COATED ORAL at 13:17

## 2019-09-27 RX ADMIN — HYDROMORPHONE HYDROCHLORIDE 6 MG: 2 TABLET ORAL at 06:52

## 2019-09-27 RX ADMIN — HYDROMORPHONE HYDROCHLORIDE 6 MG: 2 TABLET ORAL at 01:57

## 2019-09-27 RX ADMIN — HYDROMORPHONE HYDROCHLORIDE 6 MG: 2 TABLET ORAL at 11:52

## 2019-09-27 RX ADMIN — HYDROXYZINE HYDROCHLORIDE 10 MG: 10 TABLET ORAL at 18:17

## 2019-09-27 RX ADMIN — AMLODIPINE BESYLATE 5 MG: 5 TABLET ORAL at 09:06

## 2019-09-27 RX ADMIN — SENNOSIDES AND DOCUSATE SODIUM 2 TABLET: 8.6; 5 TABLET ORAL at 09:06

## 2019-09-27 RX ADMIN — TRAMADOL HYDROCHLORIDE 50 MG: 50 TABLET, FILM COATED ORAL at 16:51

## 2019-09-27 RX ADMIN — ACETAMINOPHEN 975 MG: 325 TABLET, FILM COATED ORAL at 18:17

## 2019-09-27 RX ADMIN — Medication 10 ML: at 09:07

## 2019-09-27 RX ADMIN — HYDROXYZINE HYDROCHLORIDE 10 MG: 10 TABLET ORAL at 11:52

## 2019-09-27 RX ADMIN — SIMVASTATIN 20 MG: 20 TABLET, FILM COATED ORAL at 21:55

## 2019-09-27 RX ADMIN — Medication 10 ML: at 13:17

## 2019-09-27 RX ADMIN — ACETAMINOPHEN 975 MG: 325 TABLET, FILM COATED ORAL at 03:16

## 2019-09-27 RX ADMIN — HYDROXYZINE HYDROCHLORIDE 10 MG: 10 TABLET ORAL at 03:16

## 2019-09-27 RX ADMIN — METOPROLOL TARTRATE 25 MG: 25 TABLET, FILM COATED ORAL at 09:06

## 2019-09-27 RX ADMIN — METOPROLOL TARTRATE 25 MG: 25 TABLET, FILM COATED ORAL at 20:39

## 2019-09-27 RX ADMIN — TRAMADOL HYDROCHLORIDE 50 MG: 50 TABLET, FILM COATED ORAL at 18:16

## 2019-09-27 ASSESSMENT — ACTIVITIES OF DAILY LIVING (ADL)
ADLS_ACUITY_SCORE: 13

## 2019-09-27 NOTE — PLAN OF CARE
9/27  07:00-15:30a  VS: Stable   O2 Sats 94% on room air    Lungs CTA  IS goal 2000, able to reach 1500, tolerates well  Occasional non productive cough   Output: Voiding frequently in small amounts.  Voided @ 13:40, 150 ml Bladder scanned @ 13:48 for 973.  Up for second void @ 13:57, voided an additional 225 with PVR of 549.  Straight cath done @ 15:00, 450 returned   Bowels/BM BS + all quadrants, + flatus  Last BM 9/25   Activity Assist of 1  Bilateral lower extremity numbness/tingling and weakness which was new after this surgery   Skin: Intact except for surgical incision left shoulder   Pain: Surgical site pain managed with prn  dilaudid this morning.  Dilaudid is causing itching which is not resolved with prn hydroxyzine.  Dr Post contacted and new order for Ultram was placed  Ice pack provided and refreshed through out the shift   Neuro/CMS: Numbness/Tingling bilateral lower extremities   Dressing: Aquacel dressing left shoulder, CDI   Diet: Regular, tolerating well   LDA: PICC right arm, good blood return,saline locked between IV antibiotics.  dressing change due 10/2   Equipment: IV pump and pole  PCD's  Sling with pillow  Gregorio walker/gait belt   Plan: TBD   Additional Info: MRI ordered,  planned for between 15:45 & 16:00

## 2019-09-27 NOTE — PROGRESS NOTES
Care Coordinator Progress Note    Admission Date/Time:  9/25/2019  Attending MD:  Jorge A Desir*    Data  Chart reviewed, discussed with interdisciplinary team.   Patient was admitted for: Status post shoulder surgery.    Concerns with insurance coverage for discharge needs: None.  Current Living Situation: Patient lives with spouse.  Support System: Involved  Services Involved: Home Infusion  Transportation at Discharge: Family or friend will provide  Transportation to Medical Appointments:   - Name of caregiver: Dyllan,   Barriers to Discharge: NA    Coordination of Care and Referrals: Provided patient/family with options for Home Infusion.        Assessment  Met with patient at bedside to discuss discharge planning. A referral was sent to [facility] for home IV antibiotic therapy. PICC in place, patient to discharge on current IV antibiotic of Rocephin. Possible discharge on 9/28 with a [facility] RN visit on 9/29.  Patient will have an MRI of her feet today and home PT will be ordered if appropriate. No further discharge concerns at this time. RNCC will continue to follow.    [facility]  Phone # [phone]  Fax # [phone]      Paper script for abx faxed to [facility].    Plan  Anticipated Discharge Date:  9/29/2019  Anticipated Discharge Plan:  Home with home infusion.    Janel Mckeon RN, BSN  Care Coordinator, 8A  Phone [phone]  Pager [phone]

## 2019-09-27 NOTE — PLAN OF CARE
VS: Stable    O2: Room air   Output: Voiding adequate amts in bathroom. Instructed pt to double void.    Last BM: 9/25/2019   Activity: 1 assist   Up for meals? Yes   Skin: Incision, drain; Bump/pain on back of head from unknown injury in surgery.    Pain: PO Dilauded increased to 4-6 Q4 today; 6mg given and pain persisted to one dose give of IV Dilaudid.    CMS: Numbness and tingling in both feet as a result of positioning during surgery; some tingling left hand   Dressing: CDI   Diet: Regular (good appetite)   LDA: PICC SL . Drain patent    Equipment: Brace (not to be removed), sling, IV pole, commode   Plan: Continue to monitor   Additional Info: Magnesium and calcium were low this morning. Pt received one unit of blood and magnesium replacement. Levels to be rechecked in the morning.

## 2019-09-27 NOTE — PLAN OF CARE
VS: VSS   O2: Mid 90s on RA   Output: Voiding adequate amount    Last BM: 9/25/2019   Activity: Up to commode with one assist   Skin: Skin intact. R shoulder incision. Small bump on back of head   Pain: Pain managed with PO dilaudid and acetaminophen as well as ice packs   CMS: Bilateral foot numbness and tingling. Some numbness/tingling in L hand thumb   Dressing: CDI   Diet: Tolerating regular diet without N/V. Good appetite   LDA: R picc SL. LUKAS drain output 50 ml.   Equipment: IV pole, commode, brace, sling   Plan: Continue to monitor. Possible disharge this weekend   Additional Info:

## 2019-09-27 NOTE — PROGRESS NOTES
Care Coordinator Progress Note    Admission Date/Time:  9/25/2019  Attending MD:  Jorge A Desir*    Data  Chart reviewed, discussed with interdisciplinary team.   Patient was admitted for: Status post shoulder surgery.    Assessment  Benefit check in progress with I for home IV antibiotic infusions. RNCC will continue to follow pt progress.    [facility]  Phone # [phone]  Fax # [phone]      Plan  Anticipated Discharge Date:  9/27/2019  Anticipated Discharge Plan:  Home with home care.    Janel Mckeon RN, BSN  Care Coordinator,   Phone [phone]  Pager [phone]

## 2019-09-27 NOTE — PROGRESS NOTES
S: Order received to fit patient with a melton Fx brace  as ordered by zayda Cruz CNP.  O/G: Support and stabilize humeral FX.  A:  Patient's bicep was measures and fit with size large.  The fit of the Melton is adequate. The shoulder section was removed at the request of the ordering provider.   P: contact orthotics if any issues arise.  ELISSA Mills.

## 2019-09-27 NOTE — PLAN OF CARE
Discharge Planner OT   Patient plan for discharge: Home with assist  Current status: Patient increased independence with UB dressing, sling management and UB HEP for L hand/wrist/elbow only.  Patient limited by LB numbness with ambulating 10' in room with strong CGA/HHA with need to follow with chair due to instability.  Barriers to return to prior living situation: Decreased sensation in B LE (per patient from surgical positioning)  Recommendations for discharge: Patient has not demonstrated safe mobility to discharge home and has significant fall risk at this time; not recommending discharge home at this time and would recommend TCU if discharging today.  Will continue to monitor LB sensation while inpatient.  Rationale for recommendations: Continued daily OT for maximum independence in ADLs/mobility       Entered by: Katiana Ann 09/27/2019 9:22 AM

## 2019-09-27 NOTE — PROGRESS NOTES
S:   Pt doing well.  Pain well controlled.    O:   Sets deltoid.         EPL, FPL, FA, I, G + motor.         Sensation grossly intact to light touch in Axillary,            Musculocutaneous, Median, Radial, and            Ulnar nerve distributions.         Dressing clean, dry and intact.       A:   Doing well         P:   ** D/C planning when discharge criteria met and plan in place from ID standpoint.        ** Appreciate Medicine assistance with medical            comorbidities.        ** PO Analgesia today.        ** Sling at all times when not doing exercises.   ** NSAIDs are OK if needed for pain relief and not contraindicated.

## 2019-09-27 NOTE — PROGRESS NOTES
Attestation:    I, Erick Delatorre, [facility] staff  have reviewed and agree with the following  student note on 9/27/2019 at 1:55 PM.         SPIRITUAL HEALTH SERVICES  SPIRITUAL ASSESSMENT Progress Note  [facility] [place] [place]     REFERRAL SOURCE: I responded to the charge nurse's request to check in with Donna     I introduced myself and I listened as Donna explained her current medical circumstance and empathized her loss of time and mobility as she has to wait 4-5 weeks before the next surgery. 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     PLAN: No follow-up needed at this time as Donna plans to be discharge by Saturday.     Mike France  Chaplain Resident  Pager [phone]

## 2019-09-28 ENCOUNTER — APPOINTMENT (OUTPATIENT)
Dept: MRI IMAGING | Facility: CLINIC | Age: 69
DRG: 493 | End: 2019-09-28
Attending: ORTHOPAEDIC SURGERY
Payer: COMMERCIAL

## 2019-09-28 ENCOUNTER — HOME INFUSION (PRE-WILLOW HOME INFUSION) (OUTPATIENT)
Dept: PHARMACY | Facility: CLINIC | Age: 69
End: 2019-09-28

## 2019-09-28 ENCOUNTER — APPOINTMENT (OUTPATIENT)
Dept: OCCUPATIONAL THERAPY | Facility: CLINIC | Age: 69
DRG: 493 | End: 2019-09-28
Attending: ORTHOPAEDIC SURGERY
Payer: COMMERCIAL

## 2019-09-28 LAB
ANION GAP SERPL CALCULATED.3IONS-SCNC: 4 MMOL/L (ref 3–14)
BUN SERPL-MCNC: 15 MG/DL (ref 7–30)
CALCIUM SERPL-MCNC: 7.5 MG/DL (ref 8.5–10.1)
CHLORIDE SERPL-SCNC: 110 MMOL/L (ref 94–109)
CO2 SERPL-SCNC: 27 MMOL/L (ref 20–32)
CREAT SERPL-MCNC: 0.7 MG/DL (ref 0.52–1.04)
GFR SERPL CREATININE-BSD FRML MDRD: >90 ML/MIN/{1.73_M2}
GLUCOSE SERPL-MCNC: 104 MG/DL (ref 70–99)
POTASSIUM SERPL-SCNC: 4.3 MMOL/L (ref 3.4–5.3)
SODIUM SERPL-SCNC: 141 MMOL/L (ref 133–144)

## 2019-09-28 PROCEDURE — 99232 SBSQ HOSP IP/OBS MODERATE 35: CPT | Performed by: INTERNAL MEDICINE

## 2019-09-28 PROCEDURE — 12000001 ZZH R&B MED SURG/OB UMMC

## 2019-09-28 PROCEDURE — 25000128 H RX IP 250 OP 636: Performed by: ORTHOPAEDIC SURGERY

## 2019-09-28 PROCEDURE — 25000132 ZZH RX MED GY IP 250 OP 250 PS 637: Performed by: INTERNAL MEDICINE

## 2019-09-28 PROCEDURE — 36592 COLLECT BLOOD FROM PICC: CPT | Performed by: INTERNAL MEDICINE

## 2019-09-28 PROCEDURE — 25000132 ZZH RX MED GY IP 250 OP 250 PS 637: Performed by: ORTHOPAEDIC SURGERY

## 2019-09-28 PROCEDURE — 97530 THERAPEUTIC ACTIVITIES: CPT | Mod: GO

## 2019-09-28 PROCEDURE — 99207 ZZC CDG-CUT & PASTE-POTENTIAL IMPACT ON LEVEL: CPT | Performed by: INTERNAL MEDICINE

## 2019-09-28 PROCEDURE — 97535 SELF CARE MNGMENT TRAINING: CPT | Mod: GO

## 2019-09-28 PROCEDURE — 80048 BASIC METABOLIC PNL TOTAL CA: CPT | Performed by: INTERNAL MEDICINE

## 2019-09-28 PROCEDURE — 72148 MRI LUMBAR SPINE W/O DYE: CPT

## 2019-09-28 PROCEDURE — 97110 THERAPEUTIC EXERCISES: CPT | Mod: GO

## 2019-09-28 PROCEDURE — 25000128 H RX IP 250 OP 636: Performed by: NURSE PRACTITIONER

## 2019-09-28 RX ORDER — TRAMADOL HYDROCHLORIDE 50 MG/1
50-100 TABLET ORAL EVERY 6 HOURS PRN
Qty: 40 TABLET | Refills: 0 | Status: SHIPPED | OUTPATIENT
Start: 2019-09-28 | End: 2019-10-08

## 2019-09-28 RX ORDER — POLYETHYLENE GLYCOL 3350 17 G/17G
17 POWDER, FOR SOLUTION ORAL DAILY
Status: DISCONTINUED | OUTPATIENT
Start: 2019-09-28 | End: 2019-09-29 | Stop reason: HOSPADM

## 2019-09-28 RX ADMIN — Medication 10 ML: at 16:25

## 2019-09-28 RX ADMIN — METOPROLOL TARTRATE 25 MG: 25 TABLET, FILM COATED ORAL at 19:56

## 2019-09-28 RX ADMIN — CEFTRIAXONE SODIUM 2 G: 2 INJECTION, POWDER, FOR SOLUTION INTRAMUSCULAR; INTRAVENOUS at 14:34

## 2019-09-28 RX ADMIN — TRAMADOL HYDROCHLORIDE 100 MG: 50 TABLET, FILM COATED ORAL at 20:58

## 2019-09-28 RX ADMIN — ACETAMINOPHEN 975 MG: 325 TABLET, FILM COATED ORAL at 11:39

## 2019-09-28 RX ADMIN — ACETAMINOPHEN 975 MG: 325 TABLET, FILM COATED ORAL at 03:27

## 2019-09-28 RX ADMIN — TRAMADOL HYDROCHLORIDE 100 MG: 50 TABLET, FILM COATED ORAL at 08:20

## 2019-09-28 RX ADMIN — HYDROXYZINE HYDROCHLORIDE 10 MG: 10 TABLET ORAL at 21:23

## 2019-09-28 RX ADMIN — HYDROXYZINE HYDROCHLORIDE 10 MG: 10 TABLET ORAL at 08:20

## 2019-09-28 RX ADMIN — HYDROMORPHONE HYDROCHLORIDE 0.5 MG: 1 INJECTION, SOLUTION INTRAMUSCULAR; INTRAVENOUS; SUBCUTANEOUS at 09:14

## 2019-09-28 RX ADMIN — SIMVASTATIN 20 MG: 20 TABLET, FILM COATED ORAL at 21:23

## 2019-09-28 RX ADMIN — METOPROLOL TARTRATE 25 MG: 25 TABLET, FILM COATED ORAL at 10:52

## 2019-09-28 RX ADMIN — TRAMADOL HYDROCHLORIDE 50 MG: 50 TABLET, FILM COATED ORAL at 00:25

## 2019-09-28 RX ADMIN — SENNOSIDES AND DOCUSATE SODIUM 2 TABLET: 8.6; 5 TABLET ORAL at 19:55

## 2019-09-28 RX ADMIN — HYDROMORPHONE HYDROCHLORIDE 0.3 MG: 1 INJECTION, SOLUTION INTRAMUSCULAR; INTRAVENOUS; SUBCUTANEOUS at 18:28

## 2019-09-28 RX ADMIN — AMLODIPINE BESYLATE 5 MG: 5 TABLET ORAL at 10:52

## 2019-09-28 RX ADMIN — POLYETHYLENE GLYCOL 3350 17 G: 17 POWDER, FOR SOLUTION ORAL at 19:55

## 2019-09-28 RX ADMIN — TRAMADOL HYDROCHLORIDE 50 MG: 50 TABLET, FILM COATED ORAL at 15:03

## 2019-09-28 ASSESSMENT — ACTIVITIES OF DAILY LIVING (ADL)
ADLS_ACUITY_SCORE: 13

## 2019-09-28 NOTE — PLAN OF CARE
"  VS: BP (!) 140/54 (BP Location: Left leg)   Pulse 103   Temp 97.4  F (36.3  C) (Axillary)   Resp 16   Ht 1.613 m (5' 3.5\")   Wt 93.8 kg (206 lb 12.7 oz)   SpO2 91%   BMI 36.06 kg/m       O2: Room air   Output: Commode; straight cath   Last BM: 9/24/2019   Activity: 1 assist   Up for meals? Yes   Skin: Incision, bruises   Pain: Tolerable with PRN tramadol  (makes her itchy, takes with atarax)   CMS: Numbness in BLLEs improving; numbness in fingers gone   Dressing: CDI   Diet: regular   LDA: PICC, hemovac    Equipment: Commode, IV pole, brace, danya-walker   Plan: Continue to monitor pain, urinary retention, and numbness in feet   Additional Info: Pt was supposed to go down for MRI of spine today because of enduring N/T in legs and bladder retention but the technician was not able to do the procedure due to her shoulder brace. Ortho was notified.     Continues to experience urinary retention. Straight cathed for 400 today. Voided 75 in commode.       "

## 2019-09-28 NOTE — PROGRESS NOTES
Pt was seen, case reviewed with team.  Pt notes improving numbness, with minimal numbness R foot. 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 On Rocephin     Acute blood loss anemia, stable following transfusion yesterday.     Asthma stable.     Hypertension, stable on amlodipine and metoprolol    B LE numbness, L worse than R, improving. No back symptoms. Etiology unclear. MR BOYD spine ordered    Urinary retention, unclear if related to anesthesia, ? Constipation. R/out spine process     Plan  MR L spine today  Bowel regimen  Monitor PVR, st cath prn  Continue current tx

## 2019-09-28 NOTE — PLAN OF CARE
"9/28  07:00-15:30  VS: Stable   O2 Sats 92% on room air  Lungs CTA  IS goal 2000 able to reach 1500   Output: Voids frequently, small amounts,  ml  PVRs this shift 378, 336 & 212   Bowels/BM BS + al quadrants, + flatus  Last BM 9/24, pt declined senokot-s this morning.  Pt encouraged to take senokot-s this evening.  Prune juice offered, declined.  Fluid intake encouraged.   Activity Assist of 1   Skin: Intact except for surgical incision left shoulder   Pain: Surgical site pain managed with prn ultram  Pt given 100 mg prior to MRI this morning.  Pt also received 0.5 mg of IV dilaudid prior to the MRI (after 55 minutes, pt did express increase of pain with sling removal for MRI)  50 mg of Utlram given this afternoon.  Pt reports pain is comfortably managed until she moves her arm, then aching increases.ice pack provided and refreshed through out the shift   Neuro/CMS: Numbness improving, pt states it \"no longer feels like marshmallows\" but tingling is still present BLE   Dressing: Aquacel dressing CDI left shoulder   Diet: Regular, tolerating well   LDA: PICC rigt arm, good blood return, saline locked between IV antibiotics.  dressing change due 10/2   Equipment: Sling with pillow  Gregorio walker   Plan:    Additional Info: MRI completed this morning.  OK per ortho to remove sling for MRI.      "

## 2019-09-28 NOTE — PLAN OF CARE
"Discharge Planner OT   Patient plan for discharge: home  Current status: Session ok'd by RN. Min A to adjust and manage sling. Patient completed elbow, wrist, hand exercises. Patient continues to have \"tingling\" in LE's, but reports it is improving. Patient able to ambulate ~75' in hallway using danya walker with CGA.   Barriers to return to prior living situation: decreased sensation in LE's (improving)  Recommendations for discharge: home with assist  Rationale for recommendations: will continue with OT to maximize safety and IND with functional mobility and one handed ADLs       Entered by: JAZMYN FALL HELD 09/28/2019 4:05 PM       "

## 2019-09-28 NOTE — PROGRESS NOTES
"Orthopedic Surgery Progress Note    Subjective: No acute events overnight. Pain well controlled on current regimen. Tolerating PO diet. Experiencing postop urinary retention - straight cath x2.  Denies cp, sob, calf pain, fevers, chills, sweats. Admits to bilateral foot \"tingling\" that is improving. Denies back pain.    Exam:  BP (!) 140/54 (BP Location: Left leg)   Pulse 103   Temp 97.4  F (36.3  C) (Axillary)   Resp 16   Ht 1.613 m (5' 3.5\")   Wt 93.8 kg (206 lb 12.7 oz)   SpO2 91%   BMI 36.06 kg/m    Gen: Awake, alert, NAD  Resp: breathing equal and non-labored  Extremities:    LUE: operative dressing c/d/i. Drain present, patent. 5/5 AIN/PIN/m/r/u. SILT in a/m/r/u distributions. 2+ radial pulse. Fingers WWP    BLE: 5/5 EHL/FHL/TA/Gsc. SILT in s/s/dp/sp/t distributions. 2+ DP.    Drain: 50 / 60    Labs:  Recent Labs   Lab 09/27/19  0619 09/26/19  0645   WBC  --  7.2   HGB 8.7* 7.4*   PLT  --  125*     Recent Labs   Lab 09/27/19  0619 09/26/19  0645 09/25/19  2148    143  --    POTASSIUM 4.4 3.8  --    CHLORIDE 109 116*  --    CO2 26 21  --    BUN 25 21  --    CR 0.78 0.87 1.26*   GLC 99 101*  --    MAG 2.3 1.5*  --      No lab results found in last 7 days.      Assessment:   68 year old female with an infected L TSA and intraop humeral shaft fx who is s/p:    Procedure:  Left shoulder explant, I&D and ORIF humeral shaft fx on 9/25 with Dr. Desir    Postop course complicated by urinary retention, bilateral foot parasthesias  - concern for cauda equina syndrome - STAT MRI lumbar spine ordered  - denies saddle anesthesia, bladder/bowel incontinence  - has flatus  - admits that foot parasthesias improving, denies back pain clinically or on exam  - encouraged OOB activities as able  - monitor for improvement  - straight cath per protocol    Plan:  Orthopedics Primary  - Activity: As tolerated. Sling and Ortiz brace on at all times.  - Antibiotics: per ID recs  - DVT prophylaxis: mechanical only  - " Wound Care: Aquacel leave in place  - Drain: will discontinue when output < 30 ML/shift  - Pain management: PO/PNB  - Occupational Therapy: NO active or passive range of motion to shoulder/ non weight bearing/ elbow/wrist okay.    Disposition: pending hospital course    Future Appointments   Date Time Provider Department Center   9/28/2019  9:15 AM Alisson Donaldson OT UROT [place]   9/28/2019  7:00 PM UR PT OVERFLOW URPT [place]   10/14/2019 12:30 PM Jorge A Desir MD [facility]   11/11/2019 12:40 PM Jorge A Desir MD [facility]        Mike Hernandez, PGY4  Orthopedic Surgery Resident  Pager [phone]

## 2019-09-28 NOTE — PLAN OF CARE
Pt is A&Ox4. VSS. LS clear, on RA. BS active, LBM on 9/25/2019. Pt straight cath at 2100 for 400ml. Pt unable to void entire shift, straight cath for 550ml at 0530, continue to monitor. Pain managed with 50mg tramadol. L shoulder dressing is c/d/I. R PICC patent and SL. HV patent and draining well. Pt still has numbness and tingling in BLEs, pt states it is improving. Pt up with 1 assist to commode. Continue to monitor.

## 2019-09-28 NOTE — PLAN OF CARE
"  VS: /50 (BP Location: Left leg)   Pulse 96   Temp 98.7  F (37.1  C) (Oral)   Resp 16   Ht 1.613 m (5' 3.5\")   Wt 93.8 kg (206 lb 12.7 oz)   SpO2 95%   BMI 36.06 kg/m       O2: O2 levels dipped to mid-80s on room air, Pt reported feeling \"groggy\"; put on continuous pulse ox and 1-2 liters of 02 for an hour corresponding to administration of IV Dilauded; back on room air at 2130   Output: Still retaining. Up to bathroom voiding small amounts. Continue to bladder scan after every void. Straight cath with 400 output at 2100    Last BM: 9/25/2019 (added Miralax to stool regimen this evening)   Activity: standby   Up for meals? Yes   Skin: Incision, bruises   Pain: Tolerable with 100 mg tramadol Q6 and 0.3 IV dilauded for breakthrough pain   CMS: Numbness in BLLEs continues to improve; MRI of spine was done this morning and came back normal   Dressing: CDI   Diet: Regular   LDA: PICC   Equipment: Cane, IV pole, PCDs (off this evening); canula   Plan: TBD, continue to monitor N/T, urinary retention, pain, and 02   Additional Info:        "

## 2019-09-28 NOTE — PROVIDER NOTIFICATION
Pt was unable to undergo MRI this evening because of pain/shoulder brace. Pt reports that numbness and tingling in BLLEs continuing to improve. Ortho notified.

## 2019-09-29 ENCOUNTER — PATIENT OUTREACH (OUTPATIENT)
Dept: CARE COORDINATION | Facility: CLINIC | Age: 69
End: 2019-09-29

## 2019-09-29 ENCOUNTER — HOME INFUSION (PRE-WILLOW HOME INFUSION) (OUTPATIENT)
Dept: PHARMACY | Facility: CLINIC | Age: 69
End: 2019-09-29

## 2019-09-29 ENCOUNTER — APPOINTMENT (OUTPATIENT)
Dept: OCCUPATIONAL THERAPY | Facility: CLINIC | Age: 69
DRG: 493 | End: 2019-09-29
Attending: ORTHOPAEDIC SURGERY
Payer: COMMERCIAL

## 2019-09-29 VITALS
RESPIRATION RATE: 16 BRPM | OXYGEN SATURATION: 94 % | SYSTOLIC BLOOD PRESSURE: 132 MMHG | DIASTOLIC BLOOD PRESSURE: 45 MMHG | WEIGHT: 206.79 LBS | BODY MASS INDEX: 35.3 KG/M2 | HEART RATE: 107 BPM | HEIGHT: 64 IN | TEMPERATURE: 99.1 F

## 2019-09-29 PROCEDURE — 25000128 H RX IP 250 OP 636: Performed by: NURSE PRACTITIONER

## 2019-09-29 PROCEDURE — 25000132 ZZH RX MED GY IP 250 OP 250 PS 637: Performed by: INTERNAL MEDICINE

## 2019-09-29 PROCEDURE — 25000132 ZZH RX MED GY IP 250 OP 250 PS 637: Performed by: ORTHOPAEDIC SURGERY

## 2019-09-29 PROCEDURE — 97530 THERAPEUTIC ACTIVITIES: CPT | Mod: GO

## 2019-09-29 PROCEDURE — 99232 SBSQ HOSP IP/OBS MODERATE 35: CPT | Performed by: INTERNAL MEDICINE

## 2019-09-29 PROCEDURE — 97110 THERAPEUTIC EXERCISES: CPT | Mod: GO

## 2019-09-29 PROCEDURE — 99207 ZZC CDG-CUT & PASTE-POTENTIAL IMPACT ON LEVEL: CPT | Performed by: INTERNAL MEDICINE

## 2019-09-29 PROCEDURE — 97535 SELF CARE MNGMENT TRAINING: CPT | Mod: GO

## 2019-09-29 PROCEDURE — 25000128 H RX IP 250 OP 636: Performed by: ORTHOPAEDIC SURGERY

## 2019-09-29 RX ORDER — BISACODYL 10 MG
10 SUPPOSITORY, RECTAL RECTAL DAILY PRN
Status: DISCONTINUED | OUTPATIENT
Start: 2019-09-29 | End: 2019-09-29 | Stop reason: HOSPADM

## 2019-09-29 RX ORDER — BISACODYL 10 MG
10 SUPPOSITORY, RECTAL RECTAL DAILY PRN
Qty: 2 SUPPOSITORY | Refills: 1 | Status: SHIPPED | OUTPATIENT
Start: 2019-09-29 | End: 2019-10-08

## 2019-09-29 RX ORDER — POLYETHYLENE GLYCOL 3350 17 G/17G
17 POWDER, FOR SOLUTION ORAL DAILY
Qty: 30 PACKET | Refills: 1 | Status: SHIPPED | OUTPATIENT
Start: 2019-09-30 | End: 2019-10-08

## 2019-09-29 RX ORDER — ACETAMINOPHEN 325 MG/1
TABLET ORAL
Qty: 1 BOTTLE | Refills: 0 | Status: ON HOLD | COMMUNITY
Start: 2019-09-29 | End: 2020-01-16

## 2019-09-29 RX ORDER — AMOXICILLIN 250 MG
2 CAPSULE ORAL 2 TIMES DAILY
Qty: 60 TABLET | Refills: 0 | Status: SHIPPED | OUTPATIENT
Start: 2019-09-29 | End: 2019-10-08

## 2019-09-29 RX ORDER — HYDROXYZINE HYDROCHLORIDE 10 MG/1
10 TABLET, FILM COATED ORAL EVERY 6 HOURS PRN
Qty: 20 TABLET | Refills: 0 | Status: SHIPPED | OUTPATIENT
Start: 2019-09-29 | End: 2019-10-08

## 2019-09-29 RX ADMIN — SENNOSIDES AND DOCUSATE SODIUM 2 TABLET: 8.6; 5 TABLET ORAL at 07:39

## 2019-09-29 RX ADMIN — ACETAMINOPHEN 650 MG: 325 TABLET, FILM COATED ORAL at 11:23

## 2019-09-29 RX ADMIN — TRAMADOL HYDROCHLORIDE 50 MG: 50 TABLET, FILM COATED ORAL at 13:32

## 2019-09-29 RX ADMIN — ACETAMINOPHEN 650 MG: 325 TABLET, FILM COATED ORAL at 03:13

## 2019-09-29 RX ADMIN — TRAMADOL HYDROCHLORIDE 100 MG: 50 TABLET, FILM COATED ORAL at 03:12

## 2019-09-29 RX ADMIN — Medication 10 ML: at 07:39

## 2019-09-29 RX ADMIN — AMLODIPINE BESYLATE 5 MG: 5 TABLET ORAL at 07:43

## 2019-09-29 RX ADMIN — CEFTRIAXONE SODIUM 2 G: 2 INJECTION, POWDER, FOR SOLUTION INTRAMUSCULAR; INTRAVENOUS at 12:11

## 2019-09-29 RX ADMIN — ACETAMINOPHEN 650 MG: 325 TABLET, FILM COATED ORAL at 07:40

## 2019-09-29 RX ADMIN — POLYETHYLENE GLYCOL 3350 17 G: 17 POWDER, FOR SOLUTION ORAL at 07:43

## 2019-09-29 RX ADMIN — METOPROLOL TARTRATE 25 MG: 25 TABLET, FILM COATED ORAL at 07:43

## 2019-09-29 RX ADMIN — HYDROMORPHONE HYDROCHLORIDE 0.5 MG: 1 INJECTION, SOLUTION INTRAMUSCULAR; INTRAVENOUS; SUBCUTANEOUS at 00:25

## 2019-09-29 RX ADMIN — TRAMADOL HYDROCHLORIDE 50 MG: 50 TABLET, FILM COATED ORAL at 11:23

## 2019-09-29 ASSESSMENT — ACTIVITIES OF DAILY LIVING (ADL)
FALL_HISTORY_WITHIN_LAST_SIX_MONTHS: NO
ADLS_ACUITY_SCORE: 13
TOILETING: 0-->INDEPENDENT
BATHING: 0-->INDEPENDENT
ADLS_ACUITY_SCORE: 13
RETIRED_COMMUNICATION: 0-->UNDERSTANDS/COMMUNICATES WITHOUT DIFFICULTY
DRESS: 0-->INDEPENDENT
WHICH_OF_THE_ABOVE_FUNCTIONAL_RISKS_HAD_A_RECENT_ONSET_OR_CHANGE?: DRESSING
ADLS_ACUITY_SCORE: 13
TRANSFERRING: 0-->INDEPENDENT
RETIRED_EATING: 0-->INDEPENDENT
COGNITION: 0 - NO COGNITION ISSUES REPORTED
AMBULATION: 0-->INDEPENDENT
SWALLOWING: 0-->SWALLOWS FOODS/LIQUIDS WITHOUT DIFFICULTY
ADLS_ACUITY_SCORE: 13

## 2019-09-29 NOTE — PLAN OF CARE
VS: VSS and afebrile   O2: Sats > 90% on RA. Denies SOB and    Output: Retaining urine.6 AM: last void was 50 and PVR: 306.   Refused straight cath. Will wait a little bit later.   Last BM:  and passing gas   Activity: Up to BR with SBA with cane   Skin: Dry and intact   Pain: Manageable with PRN Tramadol and Ice pack applied   CMS: Tingling and numbness on BLE-improving.  Trace edema in BLE.  Compressing stockins: ON   Dressing: CDI   Diet: Regular   LDA: PICC: patent and SL bet abx   Equipment: PCD, IV pole,    Plan: TBD   Additional Info:

## 2019-09-29 NOTE — PROGRESS NOTES
"Orthopedic Surgery Progress Note    Subjective: lumbar MRI yesterday to rule out spine pathology as cause of urinary retention, bilateral lower extremity parasthesias. Again denies back pain at this time. Has been able to void spontaneously since yesterday morning, although with residual (cath x1 for PVR). Admits to continued lower extremity parasthesia improvement.  Denies cp, sob, calf pain, fevers, chills, sweats. .    Exam:  /45 (BP Location: Left arm)   Pulse 98   Temp 97.7  F (36.5  C) (Oral)   Resp 17   Ht 1.613 m (5' 3.5\")   Wt 93.8 kg (206 lb 12.7 oz)   SpO2 92%   BMI 36.06 kg/m    Gen: Awake, alert, NAD  Resp: breathing equal and non-labored  Extremities:    LUE: operative dressing c/d/i. 5/5 AIN/PIN/m/r/u. SILT in a/m/r/u distributions. 2+ radial pulse. Fingers WWP    BLE: 5/5 EHL/FHL/TA/Gsc. SILT in s/s/dp/sp/t distributions - patchy parasthesias at level of the foot. 2+ DP.    Labs:    Recent Labs   Lab 09/27/19  0619 09/26/19  0645   WBC  --  7.2   HGB 8.7* 7.4*   PLT  --  125*     Recent Labs   Lab 09/28/19  0637 09/27/19  0619 09/26/19  0645 09/25/19  2148    139 143  --    POTASSIUM 4.3 4.4 3.8  --    CHLORIDE 110* 109 116*  --    CO2 27 26 21  --    BUN 15 25 21  --    CR 0.70 0.78 0.87 1.26*   * 99 101*  --    MAG  --  2.3 1.5*  --      No lab results found in last 7 days.      Assessment:   68 year old female with an infected L TSA and intraop humeral shaft fx who is s/p:    Procedure:  Left shoulder explant, I&D and ORIF humeral shaft fx on 9/25 with Dr. Desir    Postop course complicated by urinary retention, bilateral foot parasthesias  - rule out cauda equina syndrome - STAT MRI lumbar spine yesterday with chronic degenerative changes without acute processes  - denies saddle anesthesia, bladder/bowel incontinence  - has flatus, no BM  - admits that foot parasthesias improving, denies back pain clinically or on exam  - encouraged OOB activities as able  - monitor for " improvement  - straight cath per protocol    Plan:  Orthopedics Primary  - Activity: As tolerated. Sling and Ortiz brace on at all times.  - Antibiotics: per ID recs  - DVT prophylaxis: mechanical only  - Wound Care: Aquacel leave in place  - Drain: out  - Pain management: PO/PNB  - Occupational Therapy: NO active or passive range of motion to shoulder/ non weight bearing/ elbow/wrist okay.    Disposition: pending clearance by Hospitalist. Anticipate discharge home today with family.     Future Appointments   Date Time Provider Department Center   9/28/2019  9:15 AM Alisson Donaldson OT UROT [place]   9/28/2019  7:00 PM UR PT OVERFLOW URPT [place]   10/14/2019 12:30 PM Jorge A Desir MD [facility]   11/11/2019 12:40 PM Jorge A Desir MD [facility]        Mike Hernandez, PGY4  Orthopedic Surgery Resident  Pager [phone]

## 2019-09-29 NOTE — PLAN OF CARE
Discharge Planner OT   Patient plan for discharge: home  Current status: Patient ambulated ~100' in hallway using quad cane with CGA-SBA due to ongoing symptoms of tingling in LE's. Patient feels it is improving each day. Mod-max A to manage sling. Patient completed seated sponge bath due to fatigue.   Barriers to return to prior living situation: decreased sensation in LE's, pain, post op precautions  Recommendations for discharge: home with assist from  for ADLs as needed/household tasks. Would recommend home PT to follow up on mobility/strengthening and status of decreased sensation in LE's.   Rationale for recommendations: will likely see for 1 more OT session this pm to continue with sling training and mobility with assistive device prior to discharge home.        Entered by: JAZMYN FALL HELD 09/29/2019 10:57 AM       2nd session:  present for session. Patient min A to don/doff sling. Min-mod A for total body dressing using one handed technique. Patient ambulated in hallway using SEC with close SBA. Has cane at home, declined need for quad cane at this time. Patient completed gentle elbow, wrist, hand HEP.     Occupational Therapy Discharge Summary    Reason for therapy discharge:    Discharged to home with home therapy.    Progress towards therapy goal(s). See goals on Care Plan in [facility] electronic health record for goal details.  Most goals met, all goals addressed. Patient declined stairs, reporting no concerns.     Therapy recommendation(s):    Home with assist and home health care

## 2019-09-29 NOTE — PLAN OF CARE
9/28  07:00-13:30  VS: Stable   O2 Sats 94% on room air   Lungs CTA  IS goal 2000, reaching 1500, hourly use encouraged   Output: Voids frequently  ml, PVR this shift 356, pt did not want to be catheterized   Bowels/BM BS + all quadrants, + flatus  Last BM 9/24 took senokot-s and miralax this morning, refused suppository.   Suppository included on discharge orders.  Pt feels she will be able to go when she gets home   Activity Stand by assist of 1   Skin: Intact except for surgical incision left shoulder   Pain: Surgical site pain managed with prn tramadol, 50 mg and prn  tylenol 650 mg  Ice packs provided and refreshed through out the shift   Neuro/CMS: Numbness mostly resolved per pt report, but still dose have tingling BLE   Dressing: Auquacel dressing left shoulder, CDI   Diet: Regular, tolerating well   LDA: PICC right arm, good blood return, flushes easily, cap changed this morning.  Dressing change due 10/2   Equipment: Sling with pillow  IV pump and pole  cane   Plan: Discharge home   Additional Info: Discharge instructions and medications reviewed with pt and her .  Verbalized understanding.  Pt will be picking up discharge meds at her home pharmacy.    Discharged at 13;30 with all personal belongings.

## 2019-09-29 NOTE — PROGRESS NOTES
Care Coordinator - Discharge Planning    Admission Date/Time:  9/25/2019  Attending MD:  Jorge A Desir*     Data  Chart reviewed, discussed with interdisciplinary team.   Patient was admitted for:   1. Status post shoulder surgery         Assessment   Full assessment completed in previous note     Notified by BJ Vinson that pt will be discharged home today.  Home PT is recommended.  Per chart review primary RNCC arranged for [facility] as pt will be discharged on q day IV Rocephin.        left for [facility] inquiring about home care agency serving pt as pt now requiring home PT.       Discharge orders updated.  Reviewed with ELIAZAR Garcia    1135: Received f/u call from TAYLOR Orlando RN.  [facility] will be providing home RN services.  Per discussion with Darion she will f/u with [facility] regarding home PT services.          Coordination of Care and Referrals: Provided patient/family with options for Home Care and Home Infusion.      Plan  Anticipated Discharge Date:  9/29/19  Anticipated Discharge Plan:  Home    Roma Whitaker RN BSN, PHN RN Care Coordinator  Internal Medicine   [phone]  Pager: [phone]  Weekend RN Care Coordinator job code * * * [id number]  9/29/2019 11:37 AM

## 2019-09-29 NOTE — PROGRESS NOTES
Pt was seen, course reviewed with nursing staff and with patient and .    Overall, patient continues to feel improved.  Numbness in her feet is improving.  She is ambulating without difficulty and hopes to go home today.  She has not had a bowel movement since    Surgery, is passing flatus.  She does not feel distended denies nausea vomiting.  She has been voiding in frequent small amounts with postvoid residuals in the 100s to 300s.  He states her voiding seems normal to her.  She does note episodes of sensation of decreased bladder emptying in the past but has not had a formal diagnosis or evaluation.  She denies dysuria, perianal numbness.    She denies back pain.      Vital signs stable afebrile  Alert, fully oriented, appears comfortable.  Lungs clear  CV regular rate and rhythm   Abdomen soft, nondistended, nontender.  No lower extremity edema.  Sensation in ankles were not assessed by me today.      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 On Rocephin     Acute blood loss anemia, stable    Asthma stable.     Hypertension, stable on amlodipine and metoprolol     B LE numbness, improving, etiology not clear.  MRI of the lumbar spine does not reveal significant stenosis.    Urinary retention, etiology not clear.  Patient may have some baseline degree of urinary retention.  Anesthesia, postop pain medications and constipation may be playing a role.  No evidence clinically to suggest a primary neurologic process.  I discussed the role of constipation contributing to urinary retention.  Patient is reluctant to take a suppository this morning as she plans to discharge today but will utilize one when she returns home today.  I discussed symptoms of urinary retention that would need urgent attention at her local ER.    Plan  Okay medically for discharge.  Bowel regimen including suppository after patient is discharged, per her request  Orders  completed.

## 2019-09-29 NOTE — DISCHARGE SUMMARY
ORTHOPAEDIC DISCHARGE SUMMARY     Date of Admission: 9/25/2019  Date of Discharge: 9/29/2019  1:53 PM  Disposition: Home  Staff Physician: Jorge A Desir*  Primary Care Provider: Puma Joseph    DISCHARGE DIAGNOSIS:  Prosthesis Shoulder Infection    PROCEDURES: Procedure(s):  Left Infected Total Shoulder Arthroplasty Removal, Placement of Spacer, left humerus ORIF on 9/25/2019    BRIEF HISTORY:  68 year old female with an infected L TSA and intraop humeral shaft fx who is s/p the above surgery. The patient elected to pursue surgical management after thorough discussion of risks, benefits, and alternatives to surgery.     HOSPITAL COURSE:    Surgery was uncomplicated. Donna Etienne has done well post-operatively, although her course was complicated by urinary retention and bilateral foot parasthesias. Following discussion with hospitalist service, a lumbar spine MRI was completed in order to rule out concern of cauda equina syndrome (chronic changes with acute processes). Condition improved over her inpatient course spontaneously. Medicine was consulted post operatively to aid in management of medical comorbidities. See final recommendations below. The patient received routine nursing cares and is medically stable. Vital signs are stable. The patient is tolerating a regular diet without GI distress/nausea or vomiting. Voiding spontaneously. All PT/OT goals have been met for safe mobility. Pain is now controlled on oral medications which will be available on discharge. Stool softeners have been used while taking pain medications to help prevent constipation. Donna Etienne is deemed medically safe to discharge.     Antibiotics:  Rocephin per ID recs  DVT prophylaxis:  Mechanical, fully ambulatory  PT Progress: Has met PT/OT goals for safe mobility.    Pain Meds:  Weaned off all IV pain meds by discharge.    Discharge orders and instructions as below.    FOLLOWUP:    Future Appointments   Date Time  Provider Department Center   9/29/2019  7:30 AM Held, Alisson Cotton OT [facility]   9/29/2019  1:30 PM Held, Alisson Cotton OT [facility]   9/29/2019  7:00 PM UR PT OVERFLOW [facility]   10/14/2019 12:30 PM Jorge A Desir MD [facility]   11/11/2019 12:40 PM Jorge A Desir MD [facility]       Appointments at [facility]: [address] [place] [address]  Please call [phone] if you haven't heard regarding these appointments within 7 days of discharge.    PLANNED DISCHARGE ORDERS:      Current Discharge Medication List      START taking these medications    Details   senna-docusate (SENOKOT-S/PERICOLACE) 8.6-50 MG tablet Take 1 tablet by mouth 2 times daily  Qty: 30 tablet, Refills: 0    Associated Diagnoses: Status post shoulder surgery      traMADol (ULTRAM) 50 MG tablet Take 1-2 tablets ( mg) by mouth every 6 hours as needed for moderate pain  Qty: 40 tablet, Refills: 0    Associated Diagnoses: Status post shoulder surgery         CONTINUE these medications which have CHANGED    Details   acetaminophen (TYLENOL) 325 MG tablet Continue 975 mg every 8 hours for three days, then reduce to 650 mg every 4 hours as needed for pain.  Qty: 1 Bottle, Refills: 0    Associated Diagnoses: Status post shoulder surgery      cefTRIAXone (ROCEPHIN) 2 GM vial Inject 2 g into the vein every 24 hours Followed by Dr Shannon Aiken. [phone]   Fax [phone]  Qty: 840 mL, Refills: 0    Associated Diagnoses: Status post shoulder surgery         CONTINUE these medications which have NOT CHANGED    Details   Albuterol Sulfate (VENTOLIN IN) Inhale 2 puffs into the lungs as needed.      amLODIPine (NORVASC) 5 MG tablet Take 5 mg by mouth daily       Calcium Carbonate-Vitamin D (CALCIUM + D PO) Take 600 mg by mouth daily       Carboxymethylcellulose Sodium (EYE DROPS OP) 1 drop two times a day into left eye      desoximetasone (TOPICORT) 0.25 %  external cream Apply topically daily as needed       Fluticasone-Salmeterol (ADVAIR HFA IN) Inhale 1 puff into the lungs. 1-2 TIMES DAILY AS NEEDED      metoprolol tartrate (LOPRESSOR) 25 MG tablet Take 25 mg by mouth 2 times daily       Multiple Vitamin (DAILY MULTIVITAMIN PO) Take  by mouth daily.      SIMVASTATIN PO Take 20 mg by mouth At Bedtime.      amoxicillin (AMOXIL) 500 MG capsule TAKE 4 CAPSULES BY MOUTH 1 HOUR BEFORE DENTAL WORK  Refills: 5      Naproxen Sodium (ALEVE PO) Take 220 mg by mouth 2 times daily (with meals)                Discharge Procedure Orders   Home infusion referral   Referral Priority: Routine   Number of Visits Requested: 1     Reason for your hospital stay   Order Comments: You had shoulder surgery     Follow Up and recommended labs and tests   Order Comments: Follow up with Dr Desir in two weeks. If you see [facility], call the office at [phone] to schedule an appointment if you have not already done so. If you see him at [facility] call [phone] to schedule that appoinment..     Activity   Order Comments: Your activity upon discharge: as tolerated, sling at all times. May remove for hygiene or dressing. Follow the activity instructions for movement that were prescribed in the hospital.     Order Specific Question Answer Comments   Is discharge order? Yes      When to contact your care team   Order Comments: Call your physician for fevers greater than 101.5, chills, increased pain, redness, swelling or discharge at the surgical site. During regular business hours call Dr Desir's office and request to speak with his nurse or the triage nurses. If you see him at [facility] call [phone]. If you see him at [facility] call [phone]. After hours or on weekends call the hospital  at [phone] and ask to speak with the resident on call.     Wound care and dressings   Order Comments: You have a brown dressing on which should remain in place 5 days.  You may shower over this dressing. After 5 days you may remove it. Underneath there may be steri-strips. Leave these in place. You may shower with your wound un covered as long as it is clean and dry. Do not scrub your wound. You may leave your wound uncovered as long as it is clean and dry. Notify your physician if you notice any drainage.  .     Discharge Instructions   Order Comments: Pt is discharging on IV antibiotic  Infusions. Draw weekly cbc/dif, crp, esr,  r cmp and send to Dr Ana Luisa Aiken at [facility]. [address] [place] [address]  [phone] -Office  [phone]- fax       Mike Hernandez MD  Orthopedic Surgery PGY4  [phone]

## 2019-09-29 NOTE — PLAN OF CARE
PT order received and chart reviewed. Per OT patient with no acute inpatient PT needs. Will complete orders and defer any further therapy to OT at this time.

## 2019-09-30 ENCOUNTER — HOME INFUSION (PRE-WILLOW HOME INFUSION) (OUTPATIENT)
Dept: PHARMACY | Facility: CLINIC | Age: 69
End: 2019-09-30

## 2019-09-30 LAB
BACTERIA SPEC CULT: NO GROWTH
SPECIMEN SOURCE: NORMAL

## 2019-09-30 NOTE — PROGRESS NOTES
This is a recent snapshot of the patient's [facility] medical record.  For current drug dose and complete information and questions, call [phone] or In Basket pool, fv home infusion ([id number])  CSN Number:  [id number]

## 2019-09-30 NOTE — PROGRESS NOTES
This is a recent snapshot of the patient's [facility] medical record.  For current drug dose and complete information and questions, call [phone] or In Basket pool, fv home infusion ([id number])  CSN Number:  [id number]

## 2019-09-30 NOTE — PROGRESS NOTES
[facility] Health: Post-Discharge Note  SITUATION                                                      Admission:    Admission Date: 09/25/19   Reason for Admission: Prosthesis Shoulder Infection  Discharge:   Discharge Date: 09/29/19  Discharge Diagnosis: Prosthesis Shoulder Infection  Discharge Service: Orthopedics     BACKGROUND                                                      PROCEDURES: Procedure(s):  Left Infected Total Shoulder Arthroplasty Removal, Placement of Spacer, left humerus ORIF on 9/25/2019    Surgery was uncomplicated. Donna Etienne has done well post-operatively. Medicine was consulted post operatively to aid in management of medical comorbidities. See final recommendations below. The patient received routine nursing cares and is medically stable. Vital signs are stable. The patient is tolerating a regular diet without GI distress/nausea or vomiting. Voiding spontaneously. All PT/OT goals have been met for safe mobility. Pain is now controlled on oral medications which will be available on discharge. Stool softeners have been used while taking pain medications to help prevent constipation. Donna Etienne is deemed medically safe to discharge.     ASSESSMENT      Discharge Assessment  Patient reports symptoms are: Unchanged  Does the patient have all of their medications?: Yes  Does patient know what their new medications are for?: Yes  Does patient have a follow-up appointment scheduled?: Yes(Patient reports the [facility] nurse is coming to her house at 10:30am)  Does patient have any other questions or concerns?: No    Post-op  Did the patient have surgery or a procedure: Yes  Incision: healing(Still covered per patient. No obvious signs of infection.)  Drainage: No  Bleeding: none  Fever: No  Chills: No  Redness: No  Warmth: No  Swelling: No  Incision site pain: No  Eating & Drinking: eating and drinking without complaints/concerns  PO Intake: regular diet  Bowel Function:  normal  Date of last BM: 09/30/19  Urinary Status: voiding without complaint/concerns    PLAN                                                      Outpatient Plan:      Follow up with Dr Desir in two weeks. If you see him [facility], call the office at [phone] to schedule an appointment if you have not already done so. If you see him at [facility] call [phone] to schedule that appoinment..    Future Appointments   Date Time Provider Department Center   10/14/2019 12:30 PM Jorge A Desir MD [facility]   11/11/2019 12:40 PM Jorge A Desir MD [facility]           Zenaida Martinez, [facility]

## 2019-10-01 NOTE — PROGRESS NOTES
This is a recent snapshot of the patient's [facility] medical record.  For current drug dose and complete information and questions, call [phone] or In Basket pool, fv home infusion ([id number])  CSN Number:  [id number]

## 2019-10-02 ENCOUNTER — TRANSFERRED RECORDS (OUTPATIENT)
Dept: HEALTH INFORMATION MANAGEMENT | Facility: CLINIC | Age: 69
End: 2019-10-02

## 2019-10-03 ENCOUNTER — HOME INFUSION (PRE-WILLOW HOME INFUSION) (OUTPATIENT)
Dept: PHARMACY | Facility: CLINIC | Age: 69
End: 2019-10-03

## 2019-10-04 ENCOUNTER — HEALTH MAINTENANCE LETTER (OUTPATIENT)
Age: 69
End: 2019-10-04

## 2019-10-04 NOTE — PROGRESS NOTES
This is a recent snapshot of the patient's [facility] medical record.  For current drug dose and complete information and questions, call [phone] or In [facility] pool, fv home infusion ([id number])  CSN Number:  [id number]

## 2019-10-08 ENCOUNTER — HOME INFUSION (PRE-WILLOW HOME INFUSION) (OUTPATIENT)
Dept: PHARMACY | Facility: CLINIC | Age: 69
End: 2019-10-08

## 2019-10-08 ENCOUNTER — OFFICE VISIT (OUTPATIENT)
Dept: CT IMAGING | Facility: CLINIC | Age: 69
End: 2019-10-08
Attending: INTERNAL MEDICINE
Payer: COMMERCIAL

## 2019-10-08 VITALS
HEART RATE: 101 BPM | DIASTOLIC BLOOD PRESSURE: 88 MMHG | TEMPERATURE: 98.6 F | OXYGEN SATURATION: 96 % | SYSTOLIC BLOOD PRESSURE: 177 MMHG

## 2019-10-08 DIAGNOSIS — Z98.890 HISTORY OF REMOVAL OF JOINT PROSTHESIS OF LEFT SHOULDER DUE TO INFECTION: Primary | ICD-10-CM

## 2019-10-08 DIAGNOSIS — Z53.9 DIAGNOSIS NOT YET DEFINED: Primary | ICD-10-CM

## 2019-10-08 DIAGNOSIS — Z86.19 HISTORY OF REMOVAL OF JOINT PROSTHESIS OF LEFT SHOULDER DUE TO INFECTION: Primary | ICD-10-CM

## 2019-10-08 PROCEDURE — G0463 HOSPITAL OUTPT CLINIC VISIT: HCPCS | Mod: ZF

## 2019-10-08 ASSESSMENT — PAIN SCALES - GENERAL: PAINLEVEL: MODERATE PAIN (4)

## 2019-10-08 NOTE — NURSING NOTE
Visit Reason: Follow up    Evaluation / Assessment: Patient states pain level 4/10 in left arm. Vitals taken, allergies and medications reviewed. Patient states unaware of blood pressure rates since it's been taken in lower left leg due to PICC line and immobile arm.    Labs: NA    Procedure ordered? NA    Dressing Change: NA    Vaccine ordered / administered: NA    Other: NA

## 2019-10-09 LAB
BACTERIA SPEC CULT: NORMAL
Lab: NORMAL
SPECIMEN SOURCE: NORMAL

## 2019-10-09 NOTE — PROGRESS NOTES
This is a recent snapshot of the patient's [facility] medical record.  For current drug dose and complete information and questions, call [phone] or In Basket pool, fv home infusion ([id number])  CSN Number:  [id number]

## 2019-10-10 ENCOUNTER — HOME INFUSION (PRE-WILLOW HOME INFUSION) (OUTPATIENT)
Dept: PHARMACY | Facility: CLINIC | Age: 69
End: 2019-10-10

## 2019-10-11 DIAGNOSIS — S42.302S CLOSED FRACTURE OF SHAFT OF LEFT HUMERUS, UNSPECIFIED FRACTURE MORPHOLOGY, SEQUELA: Primary | ICD-10-CM

## 2019-10-11 NOTE — PROGRESS NOTES
This is a recent snapshot of the patient's [facility] medical record.  For current drug dose and complete information and questions, call [phone] or In Basket pool, fv home infusion ([id number])  CSN Number:  [id number]

## 2019-10-14 ENCOUNTER — OFFICE VISIT (OUTPATIENT)
Dept: ORTHOPEDICS | Facility: CLINIC | Age: 69
End: 2019-10-14
Payer: COMMERCIAL

## 2019-10-14 ENCOUNTER — ANCILLARY PROCEDURE (OUTPATIENT)
Dept: GENERAL RADIOLOGY | Facility: CLINIC | Age: 69
End: 2019-10-14
Attending: ORTHOPAEDIC SURGERY
Payer: COMMERCIAL

## 2019-10-14 DIAGNOSIS — S42.302S CLOSED FRACTURE OF SHAFT OF LEFT HUMERUS, UNSPECIFIED FRACTURE MORPHOLOGY, SEQUELA: ICD-10-CM

## 2019-10-14 DIAGNOSIS — S42.302S CLOSED FRACTURE OF SHAFT OF LEFT HUMERUS, UNSPECIFIED FRACTURE MORPHOLOGY, SEQUELA: Primary | ICD-10-CM

## 2019-10-14 NOTE — LETTER
10/14/2019       RE: Donna Etienne  [address] [facility] [address]     Dear Colleague,    Thank you for referring your patient, Donna Etienne, to the [facility] at [facility] Please see a copy of my visit note below.    CHIEF COMPLAINT:  Left shoulder pain after resection arthroplasty with spacer placement for infected total shoulder arthroplasty.      HISTORY OF PRESENT ILLNESS:  Ms. Etienne returns today for followup.  She had a fracture of her humerus during extraction because of a cemented long stem prosthesis.  She has had no growth on any of her cultures.  She has been on intravenous antibiotics.  She has no erythema or drainage.  She does not feel septic.      Her hospitalization was prolonged because of bilateral lower extremity numbness.  She continues to have a right leg, thigh and knee cramping and left foot numbness.        She notes that overall this is more concerning to her that her shoulder at this time.        PHYSICAL EXAMINATION:  Incisions are clean, dry and intact.  She can wiggle her fingers.  She sets her deltoid.      IMAGING:  Radiographs obtained today show acceptable alignment of the humeral shaft for her osteomyelitis.      ASSESSMENT:  Left shoulder, status post above procedure, doing well.      PLAN:  I had a nice talk with Ms. Etienne today.  I think we should observe her periprosthetic fracture at this time.  She should continue intravenous antibiotics.  I will contact with Dr. Aiken about whether or not Dr. Aiken feels that repeat cultures are necessary prior to reimplantation.  Her CT scan will be sent for evaluation to see whether or not she will need a custom component.         Again, thank you for allowing me to participate in the care of your patient.      Sincerely,    Jorge A Desir MD

## 2019-10-14 NOTE — NURSING NOTE
Reason For Visit:   Chief Complaint   Patient presents with     Surgical Followup     DOS 9/25/19 S/P Left shoulder:  Removal of total shoulder arthroplasty, radical debridement of the bone of the humerus for osteomyelitis, open reduction internal fixation of humeral shaft fracture.        PCP: Puma Joseph        ? No  Occupation [employer] .  Currently working? Yes.  Work status?  Full time. Per what she can do  Date of injury: None  Date of surgery: 3/2017   Type of surgery: Right shoulder replacement  Date of surgery: 7/2018  Type of surgery: Left shoulder replacement  Date of surgery 12/2018  Type of surgery Left shoulder surgery. States it was another replacement  Smoker: No     Right hand dominant    SANE score  Affected shoulder: Left   Right shoulder SANE: 80  Left shoulder SANE: 0        Pain Assessment  Patient Currently in Pain: Yes  0-10 Pain Scale: 3  Primary Pain Location: Shoulder  Pain Descriptors: Discomfort    Marah Nieves LPN

## 2019-10-14 NOTE — PROGRESS NOTES
CHIEF COMPLAINT:  Left shoulder pain after resection arthroplasty with spacer placement for infected total shoulder arthroplasty.      HISTORY OF PRESENT ILLNESS:  Ms. Etienne returns today for followup.  She had a fracture of her humerus during extraction because of a cemented long stem prosthesis.  She has had no growth on any of her cultures.  She has been on intravenous antibiotics.  She has no erythema or drainage.  She does not feel septic.      Her hospitalization was prolonged because of bilateral lower extremity numbness.  She continues to have a right leg, thigh and knee cramping and left foot numbness.        She notes that overall this is more concerning to her that her shoulder at this time.        PHYSICAL EXAMINATION:  Incisions are clean, dry and intact.  She can wiggle her fingers.  She sets her deltoid.      IMAGING:  Radiographs obtained today show acceptable alignment of the humeral shaft for her osteomyelitis.      ASSESSMENT:  Left shoulder, status post above procedure, doing well.      PLAN:  I had a nice talk with Ms. Etienne today.  I think we should observe her periprosthetic fracture at this time.  She should continue intravenous antibiotics.  I will contact with Dr. Aiken about whether or not Dr. Aiken feels that repeat cultures are necessary prior to reimplantation.  Her CT scan will be sent for evaluation to see whether or not she will need a custom component.

## 2019-10-17 ENCOUNTER — HOME INFUSION (PRE-WILLOW HOME INFUSION) (OUTPATIENT)
Dept: PHARMACY | Facility: CLINIC | Age: 69
End: 2019-10-17

## 2019-10-21 ENCOUNTER — HOME INFUSION (PRE-WILLOW HOME INFUSION) (OUTPATIENT)
Dept: PHARMACY | Facility: CLINIC | Age: 69
End: 2019-10-21

## 2019-10-21 ENCOUNTER — TELEPHONE (OUTPATIENT)
Dept: ORTHOPEDICS | Facility: CLINIC | Age: 69
End: 2019-10-21

## 2019-10-21 DIAGNOSIS — Z96.619 PROSTHETIC SHOULDER INFECTION, INITIAL ENCOUNTER (H): Primary | ICD-10-CM

## 2019-10-21 DIAGNOSIS — T84.59XA PROSTHETIC SHOULDER INFECTION, INITIAL ENCOUNTER (H): Primary | ICD-10-CM

## 2019-10-21 NOTE — PROGRESS NOTES
[facility] New Patient Visit Note:    Assessment:  1. Persistent pain following prior L TSA that was refractory to a single-stage revision and 6 weeks of therapy with vanco+ceftriaxone->ceftriaxone alone and ~2-3 weeks of rifampin. 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 S saccharolyticus is skin juli of unclear pathogenicity in many situations. She has subsequently undergone explant with re-insertion on 9/25. Cultures were negative off antibiotics.      Plan:  1. Continue ceftriaxone, to complete 6 weeks of therapy with d1=9/25 translating to a stop date of 11/6.  2. Given the negative growth of specimens intra-operatively on 9/25, suspect that biopsy for cultures would be unrevealing and may show contaminating organisms which would confound assessment of her candidacy for hardware re-insertion. I would favor re-collection of cultures at the time of implantation, though this will need to be interpreted with extreme caution because in my view her pre-test probability for infection following hardware removal and 6 weeks ceftriaxone is quite low.    It is a pleasure to participate in Donna's care. Visit length 45 min, >50% clinical counseling/care coordination.    Ana Luisa Aiken MD   of Medicine, Division of Infectious Diseases  [facility] [phone]      HPI:  This is a pleasant 67 y/o woman with PMH DJD s/p L TSA in July 2018. 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 She underwent single-stage repair in 12/2018. Cultures obtained during this revision revealed no growth. She was treated with 6 weeks IV therapy - based on review of notes and conversation with the patient, she received ~2 weeks of vanco (stopped following ? Return of sensis) ~2-3 weeks of rifampin  (stopped due to s/e including mouth swelling) and 6 weeks of ceftriaxone. Her incision demonstrated delayed healing. She continued to have pain and was ultimately referred to Dr. Desir, who performed hardware explant with spacer insertion on 9/25 and anticipates hardware re-insertion following completion of her antibiotics.    She's been doing fairly well. Incision has healed nicely. Her pain is tolerable. No GI complaints.    Current Outpatient Medications   Medication     acetaminophen (TYLENOL) 325 MG tablet     Albuterol Sulfate (VENTOLIN IN)     amLODIPine (NORVASC) 5 MG tablet     amoxicillin (AMOXIL) 500 MG capsule     Calcium Carbonate-Vitamin D (CALCIUM + D PO)     cefTRIAXone (ROCEPHIN) 2 GM vial     desoximetasone (TOPICORT) 0.25 % external cream     Fluticasone-Salmeterol (ADVAIR HFA IN)     metoprolol tartrate (LOPRESSOR) 25 MG tablet     Multiple Vitamin (DAILY MULTIVITAMIN PO)     Naproxen Sodium (ALEVE PO)     SIMVASTATIN PO     No current facility-administered medications for this visit.        Past Medical History:   Diagnosis Date     Asthma      DJD (degenerative joint disease)      DJD (degenerative joint disease)      Hepatitis childhood    type A     History of colon polyps      Hypercholesterolemia      Hypertension      Liver disease     Hepatits A as a child     Osteopenia      Phlebitis     post childbirth 25 years ago     PONV (postoperative nausea and vomiting)      Past Surgical History:   Procedure Laterality Date     ANGIOGRAPHY      HEART CATH     APPENDECTOMY       ARTHROPLASTY KNEE      Partial     ARTHROPLASTY KNEE      LEFT PARTIAL     C HAND/FINGER SURGERY UNLISTED      many on both     C SHOULDER SURG PROC UNLISTED  03/2017    rt. shoulder replacement     CL AFF SURGICAL PATHOLOGY       COLONOSCOPY       EXCISE MASS UPPER EXTREMITY  9/25/2012    Procedure: EXCISE MASS UPPER EXTREMITY;  Right Upper Arm Mass Excision;  Surgeon: Roge Do MD;  Location: [facility] OR      HC INCISION TENDON SHEATH FINGER       KNEE SURGERY  11/18/2017    rt. knee partical replacement     RELEASE TRIGGER FINGER      RIGHT INDEX     REMOVE HARDWARE ARTHROPLASTY SHOULDER. I&D, PLACE ANTIBIOTIC CEMENT BE Left 9/25/2019    Procedure: Left Infected Total Shoulder Arthroplasty Removal, Placement of Spacer, left humerus ORIF;  Surgeon: Jorge A Desir MD;  Location: [facility] OR     REPAIR TENDON FINGER(S)  7/12/2012    Procedure: REPAIR TENDON FINGER(S);  Right Index Finger Tendon Sheath Reconstruction Using Flexor  Carpi Radialis (1/2) Tendon Graft.      ;  Surgeon: Alla Arriaza MD;  Location: [facility] OR     SCAPHOID/LUNATE LIGAMENT REPAIR[       SUTURE SUSPENSIONPLASTY THUMB  2/7/2012    Procedure:SUTURE SUSPENSIONPLASTY THUMB; Left Thumb Trapezial Excision Suture Suspenionplasty  ; Surgeon:ALLA ARRIAZA; Location:[facility] OR     WRIST SURGERY       Family History   Problem Relation Age of Onset     Hypertension Mother      Osteoporosis Mother      Alcoholism Brother      Alcoholism Sister      Alcoholism Paternal Grandfather      Social History     Tobacco Use     Smoking status: Never Smoker     Smokeless tobacco: Never Used   Substance Use Topics     Alcohol use: Yes     Comment: very seldom     Drug use: No     She reports her daughter is temporarily living with her, and a sister who was recently in receipt of a DUI relies on [employer] for transportation.    Exam:  BP (!) 177/88   Pulse 101   Temp 98.6  F (37  C) (Oral)   SpO2 96%   L shoulder arthroplasty incision is well-appearing  HR regular  Lungs clear  No skin rash  No edema

## 2019-10-21 NOTE — OR NURSING
Report to Chapincito Rae RN   O-Z Plasty Text: The defect edges were debeveled with a #15 scalpel blade.  Given the location of the defect, shape of the defect and the proximity to free margins an O-Z plasty (double transposition flap) was deemed most appropriate.  Using a sterile surgical marker, the appropriate transposition flaps were drawn incorporating the defect and placing the expected incisions within the relaxed skin tension lines where possible.    The area thus outlined was incised deep to adipose tissue with a #15 scalpel blade.  The skin margins were undermined to an appropriate distance in all directions utilizing iris scissors.  Hemostasis was achieved with electrocautery.  The flaps were then transposed into place, one clockwise and the other counterclockwise, and anchored with interrupted buried subcutaneous sutures.

## 2019-10-22 NOTE — PROGRESS NOTES
This is a recent snapshot of the patient's [facility] medical record.  For current drug dose and complete information and questions, call [phone] or In Basket pool, fv home infusion ([id number])  CSN Number:  [id number]

## 2019-10-23 ENCOUNTER — HOME INFUSION (PRE-WILLOW HOME INFUSION) (OUTPATIENT)
Dept: PHARMACY | Facility: CLINIC | Age: 69
End: 2019-10-23

## 2019-10-23 ENCOUNTER — TRANSFERRED RECORDS (OUTPATIENT)
Dept: HEALTH INFORMATION MANAGEMENT | Facility: CLINIC | Age: 69
End: 2019-10-23

## 2019-10-24 ENCOUNTER — HOME INFUSION (PRE-WILLOW HOME INFUSION) (OUTPATIENT)
Dept: PHARMACY | Facility: CLINIC | Age: 69
End: 2019-10-24

## 2019-10-24 NOTE — PROGRESS NOTES
This is a recent snapshot of the patient's [facility] medical record.  For current drug dose and complete information and questions, call [phone] or In Basket pool, fv home infusion ([id number])  CSN Number:  [id number]

## 2019-10-24 NOTE — PROGRESS NOTES
This is a recent snapshot of the patient's [facility] medical record.  For current drug dose and complete information and questions, call [phone] or In Basket pool, fv home infusion ([id number])  CSN Number:  [id number]

## 2019-10-25 NOTE — PROGRESS NOTES
This is a recent snapshot of the patient's [facility] medical record.  For current drug dose and complete information and questions, call [phone] or In Basket pool, fv home infusion ([id number])  CSN Number:  [id number]

## 2019-10-29 ENCOUNTER — TELEPHONE (OUTPATIENT)
Dept: ORTHOPEDICS | Facility: CLINIC | Age: 69
End: 2019-10-29

## 2019-10-29 NOTE — TELEPHONE ENCOUNTER
[facility] Call Center    Phone Message    May a detailed message be left on voicemail: yes    Reason for Call: Other: Pt called back in checking the status of her previous request. Please follow up when available. Thank you      Action Taken: Message routed to:  [facility] ([facility] Ortho

## 2019-10-29 NOTE — TELEPHONE ENCOUNTER
[facility] Call Center    Phone Message    May a detailed message be left on voicemail: yes    Reason for Call: Other: Pt has questions regarding PICC line. Please call her to discuss.     Action Taken: Message routed to:  [facility] ([facility]): Ortho

## 2019-11-01 NOTE — TELEPHONE ENCOUNTER
Patient was informed the PICC can be removed after her last IV antibiotic infusion 11/06/19.  Message was also left on the voicemail of [facility] ([phone]), patient's home care agency.

## 2019-11-08 ENCOUNTER — HOME INFUSION (PRE-WILLOW HOME INFUSION) (OUTPATIENT)
Dept: PHARMACY | Facility: CLINIC | Age: 69
End: 2019-11-08

## 2019-11-11 ENCOUNTER — ANCILLARY PROCEDURE (OUTPATIENT)
Dept: GENERAL RADIOLOGY | Facility: CLINIC | Age: 69
End: 2019-11-11
Attending: ORTHOPAEDIC SURGERY
Payer: COMMERCIAL

## 2019-11-11 ENCOUNTER — OFFICE VISIT (OUTPATIENT)
Dept: ORTHOPEDICS | Facility: CLINIC | Age: 69
End: 2019-11-11
Payer: COMMERCIAL

## 2019-11-11 ENCOUNTER — MEDICAL CORRESPONDENCE (OUTPATIENT)
Dept: HEALTH INFORMATION MANAGEMENT | Facility: CLINIC | Age: 69
End: 2019-11-11

## 2019-11-11 ENCOUNTER — PREP FOR PROCEDURE (OUTPATIENT)
Dept: ORTHOPEDICS | Facility: CLINIC | Age: 69
End: 2019-11-11

## 2019-11-11 DIAGNOSIS — S42.302S CLOSED FRACTURE OF SHAFT OF LEFT HUMERUS, UNSPECIFIED FRACTURE MORPHOLOGY, SEQUELA: Primary | ICD-10-CM

## 2019-11-11 DIAGNOSIS — S42.302S CLOSED FRACTURE OF SHAFT OF LEFT HUMERUS, SEQUELA: Primary | ICD-10-CM

## 2019-11-11 DIAGNOSIS — S42.302S CLOSED FRACTURE OF SHAFT OF LEFT HUMERUS, UNSPECIFIED FRACTURE MORPHOLOGY, SEQUELA: ICD-10-CM

## 2019-11-11 NOTE — NURSING NOTE
Teaching Flowsheet   Relevant Diagnosis:Left humerus shaft fracture, Shoulder infection  Teaching Topic: Pre-op for spacer removal, reverse TSA with VRS - scheduled at Health system 01/15/2020    Repeat x-rays AP/Lateral of humerus one week before surgery.  These can be done at the [facility].  Dr Desir will review     Person(s) involved in teaching:   Patient  Spouse     Motivation Level:  Asks Questions: Yes  Eager to Learn: Yes  Cooperative: Yes  Receptive (willing/able to accept information): Yes  Any cultural factors/Cheondoism beliefs that may influence understanding or compliance? No     Patient and Family demonstrates understanding of the following:  Reason for the appointment, diagnosis and treatment plan: Yes  Knowledge of proper use of medications and conditions for which they are ordered (with special attention to potential side effects or drug interactions): Yes  Which situations necessitate calling provider and whom to contact: Yes     Teaching Concerns Addressed:   Spouse will provide assistance with cares after surgery  Aware of post-op expectations     Proper use and care of sling x 6 weeks (medical equip, care aids, etc.): Yes  Nutritional needs and diet plan: Yes  Pain management techniques: Yes  Wound Care: Yes  How and/when to access community resources: Yes     Instructional Materials Used/Given: Pre-op packet, surgical soap, written guidelines for care after shoulder replacement

## 2019-11-11 NOTE — PROGRESS NOTES
CHIEF COMPLAINT:  Six weeks status post left shoulder removal of long stem cemented total shoulder arthroplasty with placement of spacer.  Date of surgery 09/25/2019.       HISTORY OF PRESENT ILLNESS:  Ms. Etienne returns today for followup.  She has done pretty well.  She has no complaints.      She is eager to get out of her [place].      PHYSICAL EXAMINATION:  On exam today, her incision is clean, dry and intact.  She sets her anterior, middle and posterior deltoid well and has EPL, FPL, finger abductors, intrinsics,  that are okay.      RADIOGRAPHS:  AP and lateral of the humerus demonstrate callus formation with an intact spacer on a stick.      ASSESSMENT:  Status post above procedure, doing well.      PLAN:  I had a nice talk with Ms. Etienne today.  She has a Vault Reconstruction System implant that has been ordered.  I will plan on reimplanting it in January.  She will not need postoperative antibiotics after this.  I will see her back at that time.  She should get 1 set of repeat AP and lateral of the humerus radiographs so we can advance.  These can be done in [place] and sent down to me for review electronically.

## 2019-11-11 NOTE — NURSING NOTE
Reason For Visit:   Chief Complaint   Patient presents with     Surgical Followup     6 wk psot-op Left Infected total shoulder arthroplasty removal, placement spacer DOS 9/25/19         PCP: Puma Joseph        ? No  Occupation [employer] .  Currently working? Yes.  Work status?  Full time. Per what she can do  Date of injury: None  Date of surgery: 3/2017   Type of surgery: Right shoulder replacement  Date of surgery: 7/2018  Type of surgery: Left shoulder replacement  Date of surgery 12/2018  Type of surgery Left shoulder surgery. States it was another replacement  Date of surgery: 9/25/19  Type of surgery:   1.  Left shoulder:  Removal of total shoulder arthroplasty.   2.  Left shoulder radical debridement of the bone of the humerus for osteomyelitis.   3.  Left shoulder open reduction internal fixation of humeral shaft fracture  Smoker: No     Right hand dominant    SANE score  Affected shoulder: Left  Right shoulder SANE: 80  Left shoulder SANE: 0    There were no vitals taken for this visit.      Pain Assessment  Patient Currently in Pain: Justine Josue, ATC

## 2019-11-11 NOTE — LETTER
11/11/2019       RE: Donna Etienne  [id number] [facility] [id number]     Dear Colleague,    Thank you for referring your patient, Donna Etienne, to the [facility] at [facility]. Please see a copy of my visit note below.    CHIEF COMPLAINT:  Six weeks status post left shoulder removal of long stem cemented total shoulder arthroplasty with placement of spacer.  Date of surgery 09/25/2019.       HISTORY OF PRESENT ILLNESS:  Ms. Etienne returns today for followup.  She has done pretty well.  She has no complaints.      She is eager to get out of her Ortiz.      PHYSICAL EXAMINATION:  On exam today, her incision is clean, dry and intact.  She sets her anterior, middle and posterior deltoid well and has EPL, FPL, finger abductors, intrinsics,  that are okay.      RADIOGRAPHS:  AP and lateral of the humerus demonstrate callus formation with an intact spacer on a stick.      ASSESSMENT:  Status post above procedure, doing well.      PLAN:  I had a nice talk with Ms. Etienne today.  She has a Vault Reconstruction System implant that has been ordered.  I will plan on reimplanting it in January.  She will not need postoperative antibiotics after this.  I will see her back at that time.  She should get 1 set of repeat AP and lateral of the humerus radiographs so we can advance.  These can be done in [place] and sent down to me for review electronically.     Again, thank you for allowing me to participate in the care of your patient.      Sincerely,    Jorge A Desir MD

## 2019-11-12 NOTE — PROGRESS NOTES
This is a recent snapshot of the patient's [facility] medical record.  For current drug dose and complete information and questions, call [phone] or In Basket pool, [facility] home infusion ([id number])  CSN Number:  [id number]

## 2019-11-13 ENCOUNTER — MEDICAL CORRESPONDENCE (OUTPATIENT)
Dept: HEALTH INFORMATION MANAGEMENT | Facility: CLINIC | Age: 69
End: 2019-11-13

## 2019-12-18 DIAGNOSIS — S42.302S CLOSED FRACTURE OF SHAFT OF LEFT HUMERUS, SEQUELA: Primary | ICD-10-CM

## 2020-01-02 ENCOUNTER — TRANSFERRED RECORDS (OUTPATIENT)
Dept: HEALTH INFORMATION MANAGEMENT | Facility: CLINIC | Age: 70
End: 2020-01-02

## 2020-01-13 ENCOUNTER — TELEPHONE (OUTPATIENT)
Dept: ORTHOPEDICS | Facility: CLINIC | Age: 70
End: 2020-01-13

## 2020-01-13 NOTE — TELEPHONE ENCOUNTER
Left humerus x-rays done in [place] were reviewed by Dr Desir.   Patient is scheduled for left shoulder spacer removal and placement of a reverse total shoulder with VRS  01/15/2020.  There is no change in surgical plan.  Message was left on patient's voicemail.

## 2020-01-14 ENCOUNTER — ANESTHESIA EVENT (OUTPATIENT)
Dept: SURGERY | Facility: CLINIC | Age: 70
DRG: 483 | End: 2020-01-14
Payer: COMMERCIAL

## 2020-01-14 NOTE — OR NURSING
Pt requested that her records done by Dr. Desir be sent to her orthopedist in [place], Dr. Boom Smith. In basket message sent to [facility] at Dr. Desir's office (Rhonda Regalado RN) as well as Dr. Desir regarding this request.

## 2020-01-15 ENCOUNTER — APPOINTMENT (OUTPATIENT)
Dept: GENERAL RADIOLOGY | Facility: CLINIC | Age: 70
DRG: 483 | End: 2020-01-15
Attending: ORTHOPAEDIC SURGERY
Payer: COMMERCIAL

## 2020-01-15 ENCOUNTER — ANESTHESIA (OUTPATIENT)
Dept: SURGERY | Facility: CLINIC | Age: 70
DRG: 483 | End: 2020-01-15
Payer: COMMERCIAL

## 2020-01-15 ENCOUNTER — HOSPITAL ENCOUNTER (INPATIENT)
Facility: CLINIC | Age: 70
LOS: 2 days | Discharge: HOME | DRG: 483 | End: 2020-01-17
Attending: ORTHOPAEDIC SURGERY | Admitting: ORTHOPAEDIC SURGERY
Payer: COMMERCIAL

## 2020-01-15 DIAGNOSIS — S42.302S CLOSED FRACTURE OF SHAFT OF LEFT HUMERUS, SEQUELA: ICD-10-CM

## 2020-01-15 DIAGNOSIS — Z98.890 STATUS POST SHOULDER SURGERY: Primary | ICD-10-CM

## 2020-01-15 LAB
ABO + RH BLD: NORMAL
ABO + RH BLD: NORMAL
BLD GP AB SCN SERPL QL: NORMAL
BLOOD BANK CMNT PATIENT-IMP: NORMAL
GLUCOSE BLDC GLUCOMTR-MCNC: 92 MG/DL (ref 70–99)
GLUCOSE SERPL-MCNC: 104 MG/DL (ref 70–99)
HGB BLD-MCNC: 12.9 G/DL (ref 11.7–15.7)
SPECIMEN EXP DATE BLD: NORMAL

## 2020-01-15 PROCEDURE — 0RRK00Z REPLACEMENT OF LEFT SHOULDER JOINT WITH REVERSE BALL AND SOCKET SYNTHETIC SUBSTITUTE, OPEN APPROACH: ICD-10-PCS | Performed by: ORTHOPAEDIC SURGERY

## 2020-01-15 PROCEDURE — 36415 COLL VENOUS BLD VENIPUNCTURE: CPT | Performed by: CLINICAL NURSE SPECIALIST

## 2020-01-15 PROCEDURE — 36000064 ZZH SURGERY LEVEL 4 EA 15 ADDTL MIN - UMMC: Performed by: ORTHOPAEDIC SURGERY

## 2020-01-15 PROCEDURE — 27210794 ZZH OR GENERAL SUPPLY STERILE: Performed by: ORTHOPAEDIC SURGERY

## 2020-01-15 PROCEDURE — 25800030 ZZH RX IP 258 OP 636: Performed by: STUDENT IN AN ORGANIZED HEALTH CARE EDUCATION/TRAINING PROGRAM

## 2020-01-15 PROCEDURE — 25000132 ZZH RX MED GY IP 250 OP 250 PS 637: Performed by: STUDENT IN AN ORGANIZED HEALTH CARE EDUCATION/TRAINING PROGRAM

## 2020-01-15 PROCEDURE — 86901 BLOOD TYPING SEROLOGIC RH(D): CPT | Performed by: CLINICAL NURSE SPECIALIST

## 2020-01-15 PROCEDURE — 71000013 ZZH RECOVERY PHASE 1 LEVEL 1 EA ADDTL HR: Performed by: ORTHOPAEDIC SURGERY

## 2020-01-15 PROCEDURE — 25800030 ZZH RX IP 258 OP 636: Performed by: ORTHOPAEDIC SURGERY

## 2020-01-15 PROCEDURE — 12000001 ZZH R&B MED SURG/OB UMMC

## 2020-01-15 PROCEDURE — 25000128 H RX IP 250 OP 636: Performed by: CLINICAL NURSE SPECIALIST

## 2020-01-15 PROCEDURE — 27110028 ZZH OR GENERAL SUPPLY NON-STERILE: Performed by: ORTHOPAEDIC SURGERY

## 2020-01-15 PROCEDURE — 25000128 H RX IP 250 OP 636: Performed by: ANESTHESIOLOGY

## 2020-01-15 PROCEDURE — 25000125 ZZHC RX 250: Performed by: STUDENT IN AN ORGANIZED HEALTH CARE EDUCATION/TRAINING PROGRAM

## 2020-01-15 PROCEDURE — 00000146 ZZHCL STATISTIC GLUCOSE BY METER IP

## 2020-01-15 PROCEDURE — 86850 RBC ANTIBODY SCREEN: CPT | Performed by: CLINICAL NURSE SPECIALIST

## 2020-01-15 PROCEDURE — 25000132 ZZH RX MED GY IP 250 OP 250 PS 637: Performed by: ORTHOPAEDIC SURGERY

## 2020-01-15 PROCEDURE — 99221 1ST HOSP IP/OBS SF/LOW 40: CPT | Performed by: INTERNAL MEDICINE

## 2020-01-15 PROCEDURE — 82947 ASSAY GLUCOSE BLOOD QUANT: CPT | Performed by: ORTHOPAEDIC SURGERY

## 2020-01-15 PROCEDURE — 25000128 H RX IP 250 OP 636: Performed by: ORTHOPAEDIC SURGERY

## 2020-01-15 PROCEDURE — 25800025 ZZH RX 258: Performed by: ORTHOPAEDIC SURGERY

## 2020-01-15 PROCEDURE — 25000128 H RX IP 250 OP 636: Performed by: STUDENT IN AN ORGANIZED HEALTH CARE EDUCATION/TRAINING PROGRAM

## 2020-01-15 PROCEDURE — 27211024 ZZHC OR SUPPLY OTHER OPNP: Performed by: ORTHOPAEDIC SURGERY

## 2020-01-15 PROCEDURE — 86900 BLOOD TYPING SEROLOGIC ABO: CPT | Performed by: CLINICAL NURSE SPECIALIST

## 2020-01-15 PROCEDURE — 25000566 ZZH SEVOFLURANE, EA 15 MIN: Performed by: ORTHOPAEDIC SURGERY

## 2020-01-15 PROCEDURE — 85018 HEMOGLOBIN: CPT | Performed by: ORTHOPAEDIC SURGERY

## 2020-01-15 PROCEDURE — 36000062 ZZH SURGERY LEVEL 4 1ST 30 MIN - UMMC: Performed by: ORTHOPAEDIC SURGERY

## 2020-01-15 PROCEDURE — C1713 ANCHOR/SCREW BN/BN,TIS/BN: HCPCS | Performed by: ORTHOPAEDIC SURGERY

## 2020-01-15 PROCEDURE — 36415 COLL VENOUS BLD VENIPUNCTURE: CPT | Performed by: ORTHOPAEDIC SURGERY

## 2020-01-15 PROCEDURE — 0RPK08Z REMOVAL OF SPACER FROM LEFT SHOULDER JOINT, OPEN APPROACH: ICD-10-PCS | Performed by: ORTHOPAEDIC SURGERY

## 2020-01-15 PROCEDURE — 25000125 ZZHC RX 250: Performed by: ORTHOPAEDIC SURGERY

## 2020-01-15 PROCEDURE — 40000170 ZZH STATISTIC PRE-PROCEDURE ASSESSMENT II: Performed by: ORTHOPAEDIC SURGERY

## 2020-01-15 PROCEDURE — 37000008 ZZH ANESTHESIA TECHNICAL FEE, 1ST 30 MIN: Performed by: ORTHOPAEDIC SURGERY

## 2020-01-15 PROCEDURE — 99207 ZZC CONSULT E&M CHANGED TO INITIAL LEVEL: CPT | Performed by: INTERNAL MEDICINE

## 2020-01-15 PROCEDURE — 71000012 ZZH RECOVERY PHASE 1 LEVEL 1 FIRST HR: Performed by: ORTHOPAEDIC SURGERY

## 2020-01-15 PROCEDURE — 37000009 ZZH ANESTHESIA TECHNICAL FEE, EACH ADDTL 15 MIN: Performed by: ORTHOPAEDIC SURGERY

## 2020-01-15 PROCEDURE — 25000125 ZZHC RX 250: Performed by: ANESTHESIOLOGY

## 2020-01-15 PROCEDURE — 40000986 XR SHOULDER LT PORT G/E 2 VW: Mod: LT

## 2020-01-15 PROCEDURE — C9290 INJ, BUPIVACAINE LIPOSOME: HCPCS | Performed by: ANESTHESIOLOGY

## 2020-01-15 DEVICE — IMP SCR LOCKING BIOM REV SHLDR 3.5 HEX 4.75X35MM 180554: Type: IMPLANTABLE DEVICE | Site: SHOULDER | Status: FUNCTIONAL

## 2020-01-15 DEVICE — IMP SCR LOCKING BIOM REV SHLDR 3.5 HEX 4.75X25MM 180552: Type: IMPLANTABLE DEVICE | Site: SHOULDER | Status: FUNCTIONAL

## 2020-01-15 DEVICE — IMPLANTABLE DEVICE: Type: IMPLANTABLE DEVICE | Site: SHOULDER | Status: FUNCTIONAL

## 2020-01-15 RX ORDER — PROPOFOL 10 MG/ML
INJECTION, EMULSION INTRAVENOUS PRN
Status: DISCONTINUED | OUTPATIENT
Start: 2020-01-15 | End: 2020-01-15

## 2020-01-15 RX ORDER — ONDANSETRON 2 MG/ML
4 INJECTION INTRAMUSCULAR; INTRAVENOUS EVERY 6 HOURS PRN
Status: DISCONTINUED | OUTPATIENT
Start: 2020-01-15 | End: 2020-01-17 | Stop reason: HOSPADM

## 2020-01-15 RX ORDER — FENTANYL CITRATE 50 UG/ML
25-50 INJECTION, SOLUTION INTRAMUSCULAR; INTRAVENOUS
Status: DISCONTINUED | OUTPATIENT
Start: 2020-01-15 | End: 2020-01-15 | Stop reason: HOSPADM

## 2020-01-15 RX ORDER — AMOXICILLIN 250 MG
1 CAPSULE ORAL 2 TIMES DAILY
Status: CANCELLED | OUTPATIENT
Start: 2020-01-15

## 2020-01-15 RX ORDER — SODIUM CHLORIDE 9 MG/ML
INJECTION, SOLUTION INTRAVENOUS CONTINUOUS
Status: CANCELLED | OUTPATIENT
Start: 2020-01-15

## 2020-01-15 RX ORDER — HYDROXYZINE HYDROCHLORIDE 10 MG/1
10 TABLET, FILM COATED ORAL 3 TIMES DAILY PRN
COMMUNITY
End: 2020-01-20

## 2020-01-15 RX ORDER — HYDROXYZINE HYDROCHLORIDE 10 MG/1
10 TABLET, FILM COATED ORAL EVERY 6 HOURS PRN
Status: CANCELLED | OUTPATIENT
Start: 2020-01-15

## 2020-01-15 RX ORDER — METOPROLOL TARTRATE 25 MG/1
25 TABLET, FILM COATED ORAL 2 TIMES DAILY
Status: DISCONTINUED | OUTPATIENT
Start: 2020-01-15 | End: 2020-01-17 | Stop reason: HOSPADM

## 2020-01-15 RX ORDER — SIMVASTATIN 20 MG
20 TABLET ORAL AT BEDTIME
Status: CANCELLED | OUTPATIENT
Start: 2020-01-16

## 2020-01-15 RX ORDER — NALOXONE HYDROCHLORIDE 0.4 MG/ML
.1-.4 INJECTION, SOLUTION INTRAMUSCULAR; INTRAVENOUS; SUBCUTANEOUS
Status: CANCELLED | OUTPATIENT
Start: 2020-01-15

## 2020-01-15 RX ORDER — METOCLOPRAMIDE 5 MG/1
5 TABLET ORAL EVERY 6 HOURS PRN
Status: DISCONTINUED | OUTPATIENT
Start: 2020-01-15 | End: 2020-01-17 | Stop reason: HOSPADM

## 2020-01-15 RX ORDER — NALOXONE HYDROCHLORIDE 0.4 MG/ML
.1-.4 INJECTION, SOLUTION INTRAMUSCULAR; INTRAVENOUS; SUBCUTANEOUS
Status: DISCONTINUED | OUTPATIENT
Start: 2020-01-15 | End: 2020-01-15 | Stop reason: HOSPADM

## 2020-01-15 RX ORDER — AMLODIPINE BESYLATE 5 MG/1
5 TABLET ORAL DAILY
Status: CANCELLED | OUTPATIENT
Start: 2020-01-16

## 2020-01-15 RX ORDER — SODIUM CHLORIDE, SODIUM LACTATE, POTASSIUM CHLORIDE, CALCIUM CHLORIDE 600; 310; 30; 20 MG/100ML; MG/100ML; MG/100ML; MG/100ML
INJECTION, SOLUTION INTRAVENOUS CONTINUOUS PRN
Status: DISCONTINUED | OUTPATIENT
Start: 2020-01-15 | End: 2020-01-15

## 2020-01-15 RX ORDER — AMOXICILLIN 250 MG
1 CAPSULE ORAL 2 TIMES DAILY
Status: DISCONTINUED | OUTPATIENT
Start: 2020-01-15 | End: 2020-01-17 | Stop reason: HOSPADM

## 2020-01-15 RX ORDER — PROCHLORPERAZINE MALEATE 5 MG
5 TABLET ORAL EVERY 6 HOURS PRN
Status: CANCELLED | OUTPATIENT
Start: 2020-01-15

## 2020-01-15 RX ORDER — METHOCARBAMOL 500 MG/1
500 TABLET, FILM COATED ORAL 4 TIMES DAILY PRN
Status: CANCELLED | OUTPATIENT
Start: 2020-01-15

## 2020-01-15 RX ORDER — HYDROMORPHONE HYDROCHLORIDE 1 MG/ML
.2-.4 INJECTION, SOLUTION INTRAMUSCULAR; INTRAVENOUS; SUBCUTANEOUS
Status: CANCELLED | OUTPATIENT
Start: 2020-01-15

## 2020-01-15 RX ORDER — ONDANSETRON 4 MG/1
4 TABLET, ORALLY DISINTEGRATING ORAL EVERY 6 HOURS PRN
Status: DISCONTINUED | OUTPATIENT
Start: 2020-01-15 | End: 2020-01-17 | Stop reason: HOSPADM

## 2020-01-15 RX ORDER — SODIUM CHLORIDE, SODIUM LACTATE, POTASSIUM CHLORIDE, CALCIUM CHLORIDE 600; 310; 30; 20 MG/100ML; MG/100ML; MG/100ML; MG/100ML
INJECTION, SOLUTION INTRAVENOUS CONTINUOUS
Status: DISCONTINUED | OUTPATIENT
Start: 2020-01-15 | End: 2020-01-15 | Stop reason: HOSPADM

## 2020-01-15 RX ORDER — ALBUTEROL SULFATE 90 UG/1
2 AEROSOL, METERED RESPIRATORY (INHALATION) EVERY 6 HOURS PRN
Status: DISCONTINUED | OUTPATIENT
Start: 2020-01-15 | End: 2020-01-17 | Stop reason: HOSPADM

## 2020-01-15 RX ORDER — HYDROMORPHONE HYDROCHLORIDE 2 MG/1
2-4 TABLET ORAL EVERY 4 HOURS PRN
Status: DISCONTINUED | OUTPATIENT
Start: 2020-01-15 | End: 2020-01-17

## 2020-01-15 RX ORDER — HYDROXYZINE HYDROCHLORIDE 10 MG/1
10 TABLET, FILM COATED ORAL EVERY 6 HOURS PRN
Status: DISCONTINUED | OUTPATIENT
Start: 2020-01-15 | End: 2020-01-15

## 2020-01-15 RX ORDER — ONDANSETRON 4 MG/1
4 TABLET, ORALLY DISINTEGRATING ORAL EVERY 30 MIN PRN
Status: DISCONTINUED | OUTPATIENT
Start: 2020-01-15 | End: 2020-01-15 | Stop reason: HOSPADM

## 2020-01-15 RX ORDER — ONDANSETRON 2 MG/ML
INJECTION INTRAMUSCULAR; INTRAVENOUS PRN
Status: DISCONTINUED | OUTPATIENT
Start: 2020-01-15 | End: 2020-01-15

## 2020-01-15 RX ORDER — AMLODIPINE BESYLATE 5 MG/1
5 TABLET ORAL DAILY
Status: DISCONTINUED | OUTPATIENT
Start: 2020-01-16 | End: 2020-01-17 | Stop reason: HOSPADM

## 2020-01-15 RX ORDER — CEFAZOLIN SODIUM 2 G/100ML
2 INJECTION, SOLUTION INTRAVENOUS EVERY 8 HOURS
Status: CANCELLED | OUTPATIENT
Start: 2020-01-15 | End: 2020-01-15

## 2020-01-15 RX ORDER — CEFAZOLIN SODIUM 2 G/100ML
2 INJECTION, SOLUTION INTRAVENOUS
Status: COMPLETED | OUTPATIENT
Start: 2020-01-15 | End: 2020-01-15

## 2020-01-15 RX ORDER — METOPROLOL TARTRATE 25 MG/1
25 TABLET, FILM COATED ORAL 2 TIMES DAILY
Status: CANCELLED | OUTPATIENT
Start: 2020-01-16

## 2020-01-15 RX ORDER — ACETAMINOPHEN 325 MG/1
975 TABLET ORAL ONCE
Status: COMPLETED | OUTPATIENT
Start: 2020-01-15 | End: 2020-01-15

## 2020-01-15 RX ORDER — ONDANSETRON 4 MG/1
4 TABLET, ORALLY DISINTEGRATING ORAL EVERY 6 HOURS PRN
Status: CANCELLED | OUTPATIENT
Start: 2020-01-15

## 2020-01-15 RX ORDER — SCOLOPAMINE TRANSDERMAL SYSTEM 1 MG/1
1 PATCH, EXTENDED RELEASE TRANSDERMAL ONCE
Status: DISCONTINUED | OUTPATIENT
Start: 2020-01-15 | End: 2020-01-17

## 2020-01-15 RX ORDER — HYDROXYZINE HYDROCHLORIDE 10 MG/1
10 TABLET, FILM COATED ORAL 3 TIMES DAILY PRN
Status: DISCONTINUED | OUTPATIENT
Start: 2020-01-15 | End: 2020-01-17 | Stop reason: HOSPADM

## 2020-01-15 RX ORDER — METOCLOPRAMIDE HYDROCHLORIDE 5 MG/ML
5 INJECTION INTRAMUSCULAR; INTRAVENOUS EVERY 6 HOURS PRN
Status: DISCONTINUED | OUTPATIENT
Start: 2020-01-15 | End: 2020-01-17 | Stop reason: HOSPADM

## 2020-01-15 RX ORDER — HYDROMORPHONE HYDROCHLORIDE 2 MG/1
2-4 TABLET ORAL EVERY 4 HOURS PRN
Status: CANCELLED | OUTPATIENT
Start: 2020-01-15

## 2020-01-15 RX ORDER — SODIUM CHLORIDE 9 MG/ML
INJECTION, SOLUTION INTRAVENOUS CONTINUOUS
Status: DISCONTINUED | OUTPATIENT
Start: 2020-01-15 | End: 2020-01-17

## 2020-01-15 RX ORDER — AMOXICILLIN 250 MG
2 CAPSULE ORAL 2 TIMES DAILY
Status: DISCONTINUED | OUTPATIENT
Start: 2020-01-15 | End: 2020-01-17 | Stop reason: HOSPADM

## 2020-01-15 RX ORDER — AMOXICILLIN 250 MG
2 CAPSULE ORAL 2 TIMES DAILY
Status: CANCELLED | OUTPATIENT
Start: 2020-01-15

## 2020-01-15 RX ORDER — CEFAZOLIN SODIUM 1 G/3ML
1 INJECTION, POWDER, FOR SOLUTION INTRAMUSCULAR; INTRAVENOUS SEE ADMIN INSTRUCTIONS
Status: DISCONTINUED | OUTPATIENT
Start: 2020-01-15 | End: 2020-01-15 | Stop reason: HOSPADM

## 2020-01-15 RX ORDER — ONDANSETRON 2 MG/ML
4 INJECTION INTRAMUSCULAR; INTRAVENOUS EVERY 30 MIN PRN
Status: DISCONTINUED | OUTPATIENT
Start: 2020-01-15 | End: 2020-01-15 | Stop reason: HOSPADM

## 2020-01-15 RX ORDER — SIMVASTATIN 20 MG
20 TABLET ORAL AT BEDTIME
Status: DISCONTINUED | OUTPATIENT
Start: 2020-01-15 | End: 2020-01-17 | Stop reason: HOSPADM

## 2020-01-15 RX ORDER — CALCIUM CARBONATE 500 MG/1
1000 TABLET, CHEWABLE ORAL 4 TIMES DAILY PRN
Status: CANCELLED | OUTPATIENT
Start: 2020-01-15

## 2020-01-15 RX ORDER — FLUMAZENIL 0.1 MG/ML
0.2 INJECTION, SOLUTION INTRAVENOUS
Status: DISCONTINUED | OUTPATIENT
Start: 2020-01-15 | End: 2020-01-15 | Stop reason: HOSPADM

## 2020-01-15 RX ORDER — ACETAMINOPHEN 325 MG/1
975 TABLET ORAL EVERY 8 HOURS
Status: DISCONTINUED | OUTPATIENT
Start: 2020-01-15 | End: 2020-01-17 | Stop reason: HOSPADM

## 2020-01-15 RX ORDER — LIDOCAINE 40 MG/G
CREAM TOPICAL
Status: DISCONTINUED | OUTPATIENT
Start: 2020-01-15 | End: 2020-01-17 | Stop reason: HOSPADM

## 2020-01-15 RX ORDER — ACETAMINOPHEN 325 MG/1
650 TABLET ORAL EVERY 4 HOURS PRN
Status: DISCONTINUED | OUTPATIENT
Start: 2020-01-18 | End: 2020-01-17 | Stop reason: HOSPADM

## 2020-01-15 RX ORDER — ONDANSETRON 2 MG/ML
4 INJECTION INTRAMUSCULAR; INTRAVENOUS EVERY 6 HOURS PRN
Status: CANCELLED | OUTPATIENT
Start: 2020-01-15

## 2020-01-15 RX ORDER — NALOXONE HYDROCHLORIDE 0.4 MG/ML
.1-.4 INJECTION, SOLUTION INTRAMUSCULAR; INTRAVENOUS; SUBCUTANEOUS
Status: DISCONTINUED | OUTPATIENT
Start: 2020-01-15 | End: 2020-01-17 | Stop reason: HOSPADM

## 2020-01-15 RX ORDER — LIDOCAINE 40 MG/G
CREAM TOPICAL
Status: CANCELLED | OUTPATIENT
Start: 2020-01-15

## 2020-01-15 RX ORDER — LIDOCAINE HYDROCHLORIDE 20 MG/ML
INJECTION, SOLUTION INFILTRATION; PERINEURAL PRN
Status: DISCONTINUED | OUTPATIENT
Start: 2020-01-15 | End: 2020-01-15

## 2020-01-15 RX ORDER — EPHEDRINE SULFATE 50 MG/ML
INJECTION, SOLUTION INTRAMUSCULAR; INTRAVENOUS; SUBCUTANEOUS PRN
Status: DISCONTINUED | OUTPATIENT
Start: 2020-01-15 | End: 2020-01-15

## 2020-01-15 RX ORDER — CEFAZOLIN SODIUM 2 G/100ML
2 INJECTION, SOLUTION INTRAVENOUS EVERY 8 HOURS
Status: COMPLETED | OUTPATIENT
Start: 2020-01-15 | End: 2020-01-16

## 2020-01-15 RX ORDER — PHENYLEPHRINE HCL IN 0.9% NACL 1 MG/10 ML
SYRINGE (ML) INTRAVENOUS PRN
Status: DISCONTINUED | OUTPATIENT
Start: 2020-01-15 | End: 2020-01-15

## 2020-01-15 RX ORDER — BUPIVACAINE HYDROCHLORIDE AND EPINEPHRINE 5; 5 MG/ML; UG/ML
INJECTION, SOLUTION PERINEURAL PRN
Status: DISCONTINUED | OUTPATIENT
Start: 2020-01-15 | End: 2020-01-15

## 2020-01-15 RX ADMIN — TRANEXAMIC ACID 1 G: 1 INJECTION, SOLUTION INTRAVENOUS at 10:10

## 2020-01-15 RX ADMIN — SODIUM CHLORIDE, POTASSIUM CHLORIDE, SODIUM LACTATE AND CALCIUM CHLORIDE: 600; 310; 30; 20 INJECTION, SOLUTION INTRAVENOUS at 09:56

## 2020-01-15 RX ADMIN — Medication 100 MCG: at 10:19

## 2020-01-15 RX ADMIN — PROPOFOL 50 MG: 10 INJECTION, EMULSION INTRAVENOUS at 10:41

## 2020-01-15 RX ADMIN — Medication 5 MG: at 10:20

## 2020-01-15 RX ADMIN — ONDANSETRON 4 MG: 2 INJECTION INTRAMUSCULAR; INTRAVENOUS at 13:11

## 2020-01-15 RX ADMIN — Medication 5 MG: at 11:38

## 2020-01-15 RX ADMIN — LIDOCAINE HYDROCHLORIDE 60 MG: 20 INJECTION, SOLUTION INFILTRATION; PERINEURAL at 09:56

## 2020-01-15 RX ADMIN — Medication 100 MCG: at 10:04

## 2020-01-15 RX ADMIN — SCOPALAMINE 1 PATCH: 1 PATCH, EXTENDED RELEASE TRANSDERMAL at 07:48

## 2020-01-15 RX ADMIN — SIMVASTATIN 20 MG: 20 TABLET, FILM COATED ORAL at 22:24

## 2020-01-15 RX ADMIN — Medication 100 MCG: at 10:18

## 2020-01-15 RX ADMIN — FENTANYL CITRATE 50 MCG: 50 INJECTION INTRAMUSCULAR; INTRAVENOUS at 12:18

## 2020-01-15 RX ADMIN — FENTANYL CITRATE 50 MCG: 50 INJECTION INTRAMUSCULAR; INTRAVENOUS at 12:25

## 2020-01-15 RX ADMIN — MIDAZOLAM 1 MG: 1 INJECTION INTRAMUSCULAR; INTRAVENOUS at 08:45

## 2020-01-15 RX ADMIN — PHENYLEPHRINE HYDROCHLORIDE 0.1 MCG/KG/MIN: 10 INJECTION INTRAVENOUS at 10:23

## 2020-01-15 RX ADMIN — ACETAMINOPHEN 975 MG: 325 TABLET, FILM COATED ORAL at 17:11

## 2020-01-15 RX ADMIN — HYDROMORPHONE HYDROCHLORIDE 2 MG: 2 TABLET ORAL at 19:48

## 2020-01-15 RX ADMIN — CEFAZOLIN SODIUM 2 G: 2 INJECTION, SOLUTION INTRAVENOUS at 19:47

## 2020-01-15 RX ADMIN — FENTANYL CITRATE 50 MCG: 50 INJECTION INTRAMUSCULAR; INTRAVENOUS at 12:46

## 2020-01-15 RX ADMIN — PROPOFOL 50 MG: 10 INJECTION, EMULSION INTRAVENOUS at 11:10

## 2020-01-15 RX ADMIN — BUPIVACAINE 10 ML: 13.3 INJECTION, SUSPENSION, LIPOSOMAL INFILTRATION at 09:05

## 2020-01-15 RX ADMIN — Medication 200 MCG: at 11:38

## 2020-01-15 RX ADMIN — ACETAMINOPHEN 975 MG: 325 TABLET, FILM COATED ORAL at 07:48

## 2020-01-15 RX ADMIN — ONDANSETRON 4 MG: 2 INJECTION INTRAMUSCULAR; INTRAVENOUS at 11:30

## 2020-01-15 RX ADMIN — HYDROMORPHONE HYDROCHLORIDE 2 MG: 2 TABLET ORAL at 22:30

## 2020-01-15 RX ADMIN — BUPIVACAINE HYDROCHLORIDE AND EPINEPHRINE BITARTRATE 10 ML: 5; .005 INJECTION, SOLUTION EPIDURAL; INTRACAUDAL; PERINEURAL at 09:05

## 2020-01-15 RX ADMIN — Medication 5 MG: at 11:36

## 2020-01-15 RX ADMIN — HYDROMORPHONE HYDROCHLORIDE 2 MG: 2 TABLET ORAL at 13:11

## 2020-01-15 RX ADMIN — FENTANYL CITRATE 50 MCG: 50 INJECTION, SOLUTION INTRAMUSCULAR; INTRAVENOUS at 10:41

## 2020-01-15 RX ADMIN — HYDROMORPHONE HYDROCHLORIDE 2 MG: 2 TABLET ORAL at 17:11

## 2020-01-15 RX ADMIN — SODIUM CHLORIDE: 9 INJECTION, SOLUTION INTRAVENOUS at 19:47

## 2020-01-15 RX ADMIN — PROPOFOL 50 MG: 10 INJECTION, EMULSION INTRAVENOUS at 09:58

## 2020-01-15 RX ADMIN — PROPOFOL 200 MG: 10 INJECTION, EMULSION INTRAVENOUS at 09:56

## 2020-01-15 RX ADMIN — HYDROMORPHONE HYDROCHLORIDE 0.3 MG: 1 INJECTION, SOLUTION INTRAMUSCULAR; INTRAVENOUS; SUBCUTANEOUS at 13:00

## 2020-01-15 RX ADMIN — CEFAZOLIN SODIUM 2 G: 2 INJECTION, SOLUTION INTRAVENOUS at 10:02

## 2020-01-15 RX ADMIN — ACETAMINOPHEN 975 MG: 325 TABLET, FILM COATED ORAL at 22:29

## 2020-01-15 ASSESSMENT — ACTIVITIES OF DAILY LIVING (ADL)
WHICH_OF_THE_ABOVE_FUNCTIONAL_RISKS_HAD_A_RECENT_ONSET_OR_CHANGE?: FALL HISTORY
BATHING: 0-->INDEPENDENT
RETIRED_COMMUNICATION: 0-->UNDERSTANDS/COMMUNICATES WITHOUT DIFFICULTY
FALL_HISTORY_WITHIN_LAST_SIX_MONTHS: YES
COGNITION: 0 - NO COGNITION ISSUES REPORTED
TRANSFERRING: 0-->INDEPENDENT
NUMBER_OF_TIMES_PATIENT_HAS_FALLEN_WITHIN_LAST_SIX_MONTHS: 1
AMBULATION: 0-->INDEPENDENT
RETIRED_EATING: 0-->INDEPENDENT
TOILETING: 0-->INDEPENDENT
DRESS: 0-->INDEPENDENT
SWALLOWING: 0-->SWALLOWS FOODS/LIQUIDS WITHOUT DIFFICULTY

## 2020-01-15 ASSESSMENT — MIFFLIN-ST. JEOR: SCORE: 1434.65

## 2020-01-15 NOTE — OP NOTE
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      SURGICAL PROCEDURE:     1.  Left shoulder removal of implant, deep uncomplicated, and left shoulder removal of spacer.   2.  Left shoulder reverse total shoulder arthroplasty through complex field with custom socket replacement.      SURGEON:  Jorge A Desir MD      ESTIMATED BLOOD LOSS:  300 mL.      IMPLANTS:   1.  Biomet comprehensive vault reconstruction system custom socket replacement baseplate.   2.  Biomet comprehensive size 40 mm standard glenosphere to the E offset in the 6 o'clock anatomic location.   3.  Biomet comprehensive size 12 x 55 mm press-fit humeral stem.   4.  Biomet comprehensive size 40 mm standard +0 polyethylene humeral bearing.   5.  Biomet comprehensive size 40 mm standard +0 offset tray.        NOTE ON COMPLEXITY:  This patient's surgery was more complicated than usual.  This was because of her previous surgery, the scarring, and the bony anatomy changes.  It required a custom implant, as well as additional time technique and skill to facilitate placement of the implant.  Lastly, she had additional blood loss compared to a standard total shoulder (300 mL versus the usual 100-150).      INDICATIONS:  Ms. Etienne is a very pleasant 69-year-old woman with a history of pain and disability in her shoulder.  She had multiple surgeries for infection elsewhere.  This included a long stemmed cemented prosthesis on the humeral side.  This was infected.  I took it out.  She had a periprosthetic fracture, and I had to span this and place a spacer.  Today is a planned reimplantation of her complex shoulder.  She also had a significant amount of bony defect on the glenoid side; thus, requiring vault reconstruction system use.      DESCRIPTION OF  "PROCEDURE:  The patient was positively identified in the preanesthesia care area.  Her surgical site was initialed by me, and her consent was reviewed with her.  She was then taken to the operating theater.  She was placed in a well-padded beachchair position, prepped and draped in the usual sterile fashion for left upper extremity surgery.      Prior to surgical initiation, a \"time out\" was held in accordance with hospital policy.  Verbal verification of delivery of prophylactic intravenous antibiotics was performed.      After establishment of a 14 cm anterior deltopectoral incision, I was able to identify that there was dense and adherent scar on the anterior aspect of the shoulder.  I then was able to mobilize this.  I came in the subdeltoid space and mobilized this off the bone.  I dissected medially and laterally until I could deliver the spacer into the wound.  I removed this.  I also grabbed onto the rush negrito that had been used to fixate the humeral implant.  I removed this.  Following that, I did reaming of the canal all the way down as far as I could reach and then proximally as well.  I broached until a size 12 mm broach fit well.  I turned my attention to the glenoid side.      Glenoid exposure was more complicated than usual.  I did a circumferential release of the scar tissue.  There was no meaningful subscapularis left.  As I approached the glenoid, it was filled with scar, and I had to meticulously dissect this down to bone without limiting any of the bony fixation points.  As I did so, I used the trial and clicked it into position.  When I did so, I then opened the definitive implant, identified, tagged it in place, and placed the center position.  I then drilled the compressive screw and the peripheral locking screws.  All the peripheral locking screws measured within expectations.  Screw #1 was 25 mm.  Screw #2 was 35 mm.  Screw #3 was 35 mm.  Screw #4 was 35 mm.  The central locking screw was a " 6.5 x 40 mm screw.  Following this, I placed the definitive glenosphere in the 6 o'clock offset position at the E position and impacted it into position.  This allowed me excellent support of the glenosphere.  I then placed the humeral base humeral implant and baseplate trial.  When I reduced, it had 1 mm of longitudinal gapping with longitudinal traction and no evidence of instability with anterior superiorly directed force in maximum internal rotation.      Consequently, I copiously irrigated and placed a drain proximal to the axillary nerve.  Closure was with Ethibonds proximally and distally at a marked interval in case of need for later revision followed by 0 Vicryl, 2-0 Vicryl and 3-0 running subcuticular Monocryl.  Steri-Strips and a sterile nonadherent dressing were applied.  A sling was placed.      POSTOPERATIVE PLAN:   1.  The patient will be admitted to the Orthopedic Service for intravenous followed by oral pain medications.  She should have no active or passive range of motion at this time.   2.  At the 2-week pati, she will continue without any active or passive range of motion.   3.  At 6 weeks, she will begin activities of daily living, and her sling will be discontinued.   4.  At 3 months, she will have radiographs and determine whether or not she needs the [facility] reverse total shoulder arthroplasty protocol.         MICHELLE DE MD             D: 01/15/2020   T: 01/15/2020   MT:       Name:     KORIN HOOKS   MRN:      [id number]        Account:        [id number]   :      1950           Procedure Date: 01/15/2020      Document: [id number]

## 2020-01-15 NOTE — CONSULTS
Consult Date:  01/15/2020      INTERNAL MEDICINE CONSULTATION       ASSESSMENT:   1.  Status post removal of left shoulder implant with left shoulder reverse total shoulder arthroplasty, History of infected left shoulder arthroplasty, status post course of antibiotic therapy:  The patient is doing well postoperatively.   2.  History of bilateral lower extremity numbness and urinary retention following recent shoulder surgery in 09/2019 of unclear etiology:  MRI of the spine at that time revealed mild to moderate multilevel spinal stenosis.   3.  Hypertension, stable.   4.  Asthma, stable:  The patient was recently treated for an upper respiratory infection with Zithromax with complete resolution of symptoms.   5.  No known coronary artery disease.  The patient had a negative stress test in 2006.  To my knowledge, has not had subsequent evaluation.      RECOMMENDATIONS:  Routine postoperative labs ordered.  Blood pressure medications will be resumed with hold parameters.  She will be continued on her scheduled and p.r.n. inhalers.  Bowel and bladder function will be monitored in view of a history of urinary retention following previous shoulder surgery.  DVT prophylaxis is deferred to Dr. Desir's team.      Thank you for having me see this patient.      DISCUSSION:  Donna Etienne is a 69-year-old female who underwent a revision left total shoulder arthroplasty by Dr. Desir today.  I have been asked to see her by Dr. Desir to assess medical concerns including hypertension and asthma.      Please see Dr. Desir's notes in the charting for details regarding the patient's orthopedic history and indications for surgery.  The patient is known to me from most recent hospitalization in 09/2019 at which time she underwent removal of left shoulder arthroplasty secondary to infection.  Her initial surgery was in 2018.  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  The patient was treated with 6 weeks of IV  antibiotic therapy which included 2 weeks of vancomycin, 2 weeks of rifampin, and 6 weeks of Rocephin.  She has been followed by Dr. Aiken of Infectious Disease.      Events of this surgery are reviewed.  Estimated blood loss was 300 mL.  Vital signs were stable throughout.  Blood pressures have been in the 90s-100s postop.      Donna was seen by me shortly after admission to the Orthopedic unit.  She reports feeling well, has no arm discomfort.  She denies dizziness, cough, chest pain, shortness of breath, nausea, vomiting.  Of note is the fact that she believes she injured her back several days ago and has had intermittent back pain over the last several days.  She also twisted her left ankle and has had bruising and pain in the left ankle over the last few days.  As above, she did experience numbness in both feet following her surgery in 09/2019.  She notes that this discomfort generally resolved, though she has episodes still of mild numbness in her toes in both feet.  She denies bowel or bladder difficulties.      PAST MEDICAL HISTORY:   1.  Complicated orthopedic history related to her left shoulder as noted above.   2.  Hypertension.   3.  Asthma with most recent exacerbation approximately 2 months ago in the setting of a viral infection which was treated with Zithromax with resolution.     4.  There is no known history of cardiac disease.  She had a negative stress test in 2006.      PREOPERATIVE MEDICATIONS:   1.  Amlodipine 5 mg daily.   2.  Vistaril 10 mg 3 times a day p.r.n. pain.   3.  Metoprolol 25 mg twice daily.   4.  Zocor 20 mg at bedtime.   5.  Advair 250/50, 1 puff twice daily.  She generally takes this once a day.   6.  Albuterol inhaler on a p.r.n. basis.   7.  Calcium carbonate with vitamin D daily.   8.  Multivitamins once a day.      ALLERGIES:  CORTISONE WHICH CAUSED FLUSHING AND HEADACHE; RIFAMPIN CAUSED MILD SWELLING; OXYCODONE CAUSED ITCHING; HYDROCODONE CAUSED RASH.  THE PATIENT DID  RECEIVE HYDROMORPHONE DURING HER HOSPITALIZATION AND WAS DISCHARGED ON TRAMADOL.      SOCIAL HISTORY:  The patient lives with her .  She denies cigarette or alcohol use.      FAMILY HISTORY:  Reviewed by me and is noncontributory.      REVIEW OF SYSTEMS:  Prior to admission was remarkable for mild shoulder pain.  She denies cough, chest pain, shortness of breath, fevers, chills, nausea, vomiting, abdominal pain.  She states bowel movements have been regular.  She has been voiding without difficulty.  As above, she has had intermittent numbness in the toes of both feet.  She has also complained of back pain over the last few days as well as left foot and ankle pain after she tripped.      PHYSICAL EXAMINATION:   GENERAL:  She is a pleasant female who appears well.  She is lying in bed.   VITAL SIGNS:  She has been afebrile.   HEENT:  Pharynx benign.   NECK:  Supple.   LUNGS:  Clear.   CARDIAC:  Regular rate and rhythm without murmurs.   ABDOMEN:  Soft, nontender, nondistended.   EXTREMITIES:  There is no lower extremity edema.  She does have pain over the left ankle/foot with range of motion.  There is no calf edema.  Left ankle was wrapped.      LABORATORY DATA:  Preoperatively, hemoglobin was 12.9.  Labs from 2019 were remarkable for a creatinine of 0.7.  EKG from 2018 was normal.         FARHAN MARQUEZ MD             D: 01/15/2020   T: 01/15/2020   MT:       Name:     KORIN HOOKS   MRN:      [id number]        Account:       [id number]   :      1950           Consult Date:  01/15/2020      Document: [id number]

## 2020-01-15 NOTE — DISCHARGE SUMMARY
ORTHOPAEDIC DISCHARGE SUMMARY     Date of Admission: 1/15/2020  Date of Discharge: 1/17/2020  Disposition: Home  Staff Physician: Jorge A Desir*  Primary Care Provider: Puma Joseph    DISCHARGE DIAGNOSIS:  Closed fracture of shaft of left humerus, sequela [S42.302S]    Left foot, 5th metatarsal base fracture    PROCEDURES: Procedure(s):  Left shoulder spacer removal  Left Reverse Total Shoulder Arthroplasty on 1/15/2020    BRIEF HISTORY:  [age]-year-old female with a complex history and pain to her left shoulder.  She had multiple surgeries for infection elsewhere.  This included a revision to a long stemmed humeral implant that became subsequently infected.  This was removed and she seen a periprosthetic fracture.  This was eventually's spanned by a metal negrito and antibiotic spacer was placed in her glenohumeral joint.  During his hospitalization her spacer and negrito were removed.  Reimplantation of her reverse total shoulder arthroplasty was performed.  Risks and benefits to the surgery discussed with the patient.  She consented to the above-mentioned procedure.    HOSPITAL COURSE:    Surgery was uncomplicated. Donna Etienne has done well post-operatively. Medicine was consulted post operatively to aid in management of medical comorbidities. See final recommendations below. The patient received routine nursing cares and is medically stable. Vital signs are stable. The patient is tolerating a regular diet without GI distress/nausea or vomiting. Voiding spontaneously. All PT/OT goals have been met for safe mobility. Pain is now controlled on oral medications which will be available on discharge. Stool softeners have been used while taking pain medications to help prevent constipation. Donna Etienne is deemed medically safe to discharge.     While in the hospital the patient reported she fell the day prior to admission. She had a painful left foot. XR confirmed a 5th metatarsal base fracture. Plan is  to treat non-operatively in a weightbearing hard soled post-op shoe.    Antibiotics:  Ancef given periop and 24 hours postop.  DVT prophylaxis:  None  PT Progress: Has met PT/OT goals for safe mobility.    Pain Meds:  Weaned off all IV pain meds by discharge.  Inpatient Events: No significant events or complications.     Discharge orders and instructions as below.    FOLLOWUP:    Follow up with Dr. Desir at 2 weeks postoperatively.  Future Appointments   Date Time Provider Department Center   2/24/2020  2:00 PM Jorge A Desir MD [facility]       Appointments on [facility]. Call [phone] if you haven't heard regarding these appointments within 7 days of discharge.    PLANNED DISCHARGE ORDERS:           Discharge Medication List as of 1/17/2020  9:22 AM      START taking these medications    Details   senna-docusate (SENOKOT-S/PERICOLACE) 8.6-50 MG tablet Take 1 tablet by mouth 2 times daily, Disp-40 tablet, R-0, E-Prescribe         CONTINUE these medications which have CHANGED    Details   acetaminophen (TYLENOL) 325 MG tablet Take 2 tablets (650 mg) by mouth every 4 hours as needed for mild pain or other, Disp-1 Bottle, R-0, E-Prescribe      metoprolol tartrate (LOPRESSOR) 25 MG tablet Take 1 tablet (25 mg) by mouth 2 times daily, Historical      traMADol (ULTRAM) 50 MG tablet Take 1-2 tablets ( mg) by mouth every 6 hours as needed for moderate pain, Disp-60 tablet, R-0, Local Print         CONTINUE these medications which have NOT CHANGED    Details   Albuterol Sulfate (VENTOLIN IN) Inhale 2 puffs into the lungs as needed., 2 puff, Inhalation, PRN, Until Discontinued, Historical      amLODIPine (NORVASC) 5 MG tablet Take 5 mg by mouth daily , Historical      amoxicillin (AMOXIL) 500 MG capsule TAKE 4 CAPSULES BY MOUTH 1 HOUR BEFORE DENTAL WORK, R-5, Historical      Calcium Carbonate-Vitamin D (CALCIUM + D PO) Take 600 mg by mouth daily , Historical       desoximetasone (TOPICORT) 0.25 % external cream Apply topically daily as needed Historical      Fluticasone-Salmeterol (ADVAIR HFA IN) Inhale 1 puff into the lungs. 1-2 TIMES DAILY AS NEEDED, 1 puff, Inhalation, Until Discontinued, Historical      hydrOXYzine (ATARAX) 10 MG tablet Take 10 mg by mouth 3 times daily as needed for itching, Historical      Multiple Vitamin (DAILY MULTIVITAMIN PO) Take  by mouth daily., Oral, DAILY, Until Discontinued, Historical      SIMVASTATIN PO Take 20 mg by mouth At Bedtime., 20 mg, Oral, AT BEDTIME, Until Discontinued, Historical         STOP taking these medications       cefTRIAXone (ROCEPHIN) 2 GM vial Comments:   Reason for Stopping:         HYDROmorphone (DILAUDID) 2 MG tablet Comments:   Reason for Stopping:         Naproxen Sodium (ALEVE PO) Comments:   Reason for Stopping:                 Discharge Procedure Orders   Reason for your hospital stay   Order Comments: Status post removal left shoulder implant with left shoulder reverse total shoulder arthroplasty.     Follow Up and recommended labs and tests   Order Comments: Follow up with Dr Desir in two weeks. If you see him [facility], call the office at [phone] to schedule an appointment if you have not already done so. If you see him at [facility] call [phone] to schedule that appoinment..     When to contact your care team   Order Comments: Call your physician for fevers greater than 101.5, chills, increased pain, redness, swelling or discharge at the surgical site. During regular business hours call Dr Desir's office and request to speak with his nurse or the triage nurses. If you see him at [facility] call [phone]. If you see him at [facility] call [phone]. After hours or on weekends call the hospital  at [phone] and ask to speak with the resident on call.     Wound care and dressings   Order Comments: You have a brown dressing on which should remain in place 5 days. You may  shower over this dressing. After 5 days you may remove it. Underneath there may be steri-strips. Leave these in place. You may shower with your wound un covered as long as it is clean and dry. Do not scrub your wound. You may leave your wound uncovered as long as it is clean and dry. Notify your physician if you notice any drainage.  .     Activity   Order Comments: Your activity upon discharge: as tolerated, sling at all times. May remove for hygiene or dressing.No active or passive range of motion to the shoulder. Follow the activity instructions for movement that were prescribed in the hospital.    Please wear a hard sole shoe and WBAT for your broken foot.     Order Specific Question Answer Comments   Is discharge order? Yes        Vargas Ellison MD   Orthopaedic Surgery Resident

## 2020-01-15 NOTE — PLAN OF CARE
Pt arrived to unit at 1400 and was oriented to room and call light  VS: VSS   O2: >90% on RA   Output: Voided 200;    Last BM: 1/15   Activity: NWB SHAZIA. 1A. Ambulated in room.   Up for meals? Yes   Skin: Incision/drain L shoulder. Bruise on top of L foot and on R hip from fall 1/14.   Pain: Managed with 2mg PO dilaudid   CMS: Numbness/tingling L hand d/t block   Dressing: Aquacel/drain dressing CDI   Diet: Advanced to regular; tolerating well   LDA: PIV infusing, HV patent   Equipment: PCDs, teds, sling, IV pole, capnography   Plan: TBD   Additional Info: Pt received general anesthesia + Exparel. Minimal EBL.

## 2020-01-15 NOTE — ANESTHESIA PROCEDURE NOTES
Peripheral Nerve Block Procedure Note    Staff:     Anesthesiologist:  Gabriel Hagan DO    Resident/CRNA:  Deacon Dyer MD    Block performed by resident/CRNA in the presence of a teaching physician    Location: Pre-op  Procedure Start/Stop TImes:      1/15/2020 8:45 AM     1/15/2020 8:55 AM    patient identified, IV checked, site marked, risks and benefits discussed, informed consent, monitors and equipment checked, pre-op evaluation, at physician/surgeon's request and post-op pain management      Correct Patient: Yes      Correct Position: Yes      Correct Site: Yes      Correct Procedure: Yes      Correct Laterality:  Yes    Site Marked:  Yes  Procedure details:     Procedure:  Interscalene    ASA:  2    Laterality:  Left    Position:  Supine    Sterile Prep: chloraprep      Local skin infiltration:  2% lidocaine    Needle:  Short bevel and insulated    Needle gauge:  21    Needle length (inches):  4    Ultrasound: Yes      Ultrasound used to identify targeted nerve, plexus, or vascular structure and placed a needle adjacent to it      Permanent Image entered into patiient's record      Abnormal pain on injection: No      Blood Aspirated: No      Paresthesias:  No    Bleeding at site: No      Infusion Method:  Single Shot    Complications:  None  Assessment/Narrative:     Injection made incrementally with aspirations every (mL):  5     Blocked with 10ml of Exparel and 0.5% Bupivacaine    Informed consent obtained.  All risks and benefits of the nerve block discussed with the patient.  All questions answered and all parties agreed with the plan.   Block was placed at the surgeon's request for post operative pain control.

## 2020-01-15 NOTE — OR NURSING
PACU to Inpatient Nursing Handoff    Patient Donna Etienne is a 69 year old female who speaks English.   Procedure Procedure(s):  Left Remove spacer  place reverse left shoulder replacement   Surgeon(s) Primary: Jorge A Desir MD  Resident - Assisting: Vargas Welsh MD     Allergies   Allergen Reactions     Bee Pollen Shortness Of Breath     Prednisone Itching and Other (See Comments)     Swelling of the face     Animal Dander      Grass      Hydrocodone-Acetaminophen      Other reaction(s): Hives, Rash     Latex      Other reaction(s): Rash     Oxycodone-Acetaminophen Itching     Pollen Extract      Seasonal Allergies      Tramadol Nausea     Cortisone Other (See Comments)     Flushing and headache     No Clinical Screening - See Comments Nausea and Vomiting     Any kind of anesthesia     Rifampin Other (See Comments)     swelling around mouth       Isolation  [unfilled]     Past Medical History   has a past medical history of Asthma, DJD (degenerative joint disease), DJD (degenerative joint disease), Hepatitis (childhood), History of colon polyps, Hypercholesterolemia, Hypertension, Liver disease, Osteopenia, Phlebitis, and PONV (postoperative nausea and vomiting).    Anesthesia Combined General with Block   Dermatome Level     Preop Meds acetaminophen (Tylenol) - time given: 0748  scopolamine patch   Nerve block Interscalene.  Location:left. Med:Exparel (liposomal bupivacaine). Time given: 0855   Intraop Meds Fentanyl 50 mcg   zofran 4 mg at 1130   Local Meds No   Antibiotics cefazolin (Ancef) - last given at 1002     Pain Patient Currently in Pain: yes  Comfort: tolerable with discomfort  Pain Control: partially effective   PACU meds  Fentanyl 150 mcg last dose 1246 dilaudid 0.3 IV given at 1300 4 mg zofran at 1113, dilaudid 2 mg PO at 1311   PCA / epidural No   Capnography     Telemetry ECG Rhythm: Normal sinus rhythm   Inpatient Telemetry Monitor Ordered? No        Labs Glucose Lab Results    Component Value Date     01/15/2020       Hgb Lab Results   Component Value Date    HGB 12.9 01/15/2020       INR No results found for: INR   PACU Imaging Completed     Wound/Incision Incision/Surgical Site 02/07/12 Left Thumb (Active)   Number of days: 2899       Incision/Surgical Site 07/12/12 Right (Active)   Number of days: 2743       Incision/Surgical Site 09/25/12 Right;Upper Arm (Active)   Number of days: 2668       Incision/Surgical Site 09/25/19 Left Arm (Active)   Number of days: 112       Incision/Surgical Site 01/15/20 Left Shoulder (Active)   Incision Assessment WDL;UTV 1/15/2020 12:00 PM   Closure ADIS 1/15/2020 12:00 PM   Incision Drainage Amount None 1/15/2020 12:00 PM   Dressing Intervention Clean, dry, intact 1/15/2020 12:00 PM   Number of days: 0      CMS        Equipment ice pack and shoulder sling   Other LDA       IV Access Peripheral IV 09/25/12 Left Hand (Active)   Number of days: 2668       Peripheral IV 01/15/20 Right Hand (Active)   Site Assessment WDL 1/15/2020 12:00 PM   Line Status Infusing 1/15/2020 12:00 PM   Phlebitis Scale 0-->no symptoms 1/15/2020 12:00 PM   Infiltration Scale 0 1/15/2020 12:00 PM   Number of days: 0       PICC Single Lumen 09/24/19 Right Basilic (Active)   Number of days: 113      Blood Products Not applicable EBL minimal mL   Intake/Output Date 01/15/20 0700 - 01/16/20 0659   Shift 7564-7653 3181-8016 1008-1597 24 Hour Total   INTAKE   I.V. 1000   1000   Shift Total(mL/kg) 1000(10.86)   1000(10.86)   OUTPUT   Shift Total(mL/kg)       Weight (kg) 92.1 92.1 92.1 92.1      Drains / [place] Closed/Suction Drain 1 Left Shoulder Accordion 10 [place] (Active)   Site Description WDL 1/15/2020 12:00 PM   Dressing Status Normal: Clean, Dry & Intact 1/15/2020 12:00 PM   Drainage Appearance Bloody/Bright Red 1/15/2020 12:00 PM   Status To bulb suction 1/15/2020 12:00 PM   Number of days: 0      Time of void PreOp Void Prior to Procedure: 0824 (01/15/20 0824)    PostOp       Diapered? No   Bladder Scan     PO    ice chips     Vitals    B/P: 109/61  T: 98.2  F (36.8  C)    Temp src: Oral  P:  Pulse: 93 (01/15/20 1215)    Heart Rate: 98 (01/15/20 1215)     R: 20  O2:  SpO2: 100 %    O2 Device: Nasal cannula (01/15/20 1215)    Oxygen Delivery: 3 LPM (01/15/20 1215)         Family/support present significant other   Patient belongings     Patient transported on cart   DC meds/scripts (obs/outpt) Not applicable   Inpatient Pain Meds Released? Yes       Special needs/considerations patient fell the day 1/14 prior to surgery, pt has large bruise on top of left foot and left hip bruises   Tasks needing completion None       Josey Suarez RN  ASCOM [id number]

## 2020-01-15 NOTE — BRIEF OP NOTE
[facility], [place]    Brief Operative Note    Pre-operative diagnosis: Closed fracture of shaft of left humerus, sequela [S42.302S]  Post-operative diagnosis Same as pre-operative diagnosis    Procedure: Procedure(s):  Left Remove spacer  place reverse left shoulder replacement  Surgeon: Scot Desir    Anesthesia: Combined General with Block   Estimated blood loss: * No values recorded between 1/15/2020 10:03 AM and 1/15/2020 11:20 AM *  Drains: Hemovac  Specimens: * No specimens in log *  Findings:   None.  Complications: None.  Implants:   Implant Name Type Inv. Item Serial No.  Lot No. LRB No. Used   BONE CEMENT PALACOS W/GENTAMICIN Cement, Bone BONE CEMENT PALACOS W/GENTAMICIN -881-01  [employer] [id number] Left 2   VRS glenoid, vault reconstruction system. patient matched    BIOMET [id number] Left 1   central screw, 6.5mm, 40mm length    BIOMET [id number] Left 1   IMP SCR LOCKING BIOM REV SHLDR 3.5 HEX [id number] Metallic Hardware/Keiser IMP SCR LOCKING BIOM REV SHLDR 3.5 HEX [id number]  [employer] [id number] Left 1   IMP SCR LOCKING BIOM REV SHLDR 3.5 HEX [id number] Metallic Hardware/[employer] IMP SCR LOCKING BIOM REV SHLDR 3.5 HEX [id number] 045116  [employer] [id number] Left 1   IMP SCR LOCKING BIOM REV SHLDR 3.5 HEX [id number] Metallic Hardware/[employer]iser IMP SCR LOCKING BIOM REV SHLDR 3.5 HEX [id number] 224593  [employer] [id number] Left 1   IMP SCR LOCKING BIOM REV SHLDR 3.5 HEX [id number] 063557 Metallic Hardware/[employer]iser IMP SCR LOCKING BIOM REV SHLDR 3.5 HEX [id number] 305130  [employer] [id number] Left 1   vrs vault reconstruction system, mini taper adaptor    BIOMET [id number] Left 1   glenosphere, standard offset, 40mm diameter    BIOMET [id number] Left 1   primary shoulder stem, 12mm, 55mm long    BIOMET [id number] Left 1          1

## 2020-01-15 NOTE — ANESTHESIA CARE TRANSFER NOTE
Patient: Donna Etienne    Procedure(s):  Left Remove spacer  place reverse left shoulder replacement    Diagnosis: Closed fracture of shaft of left humerus, sequela [S42.302S]  Diagnosis Additional Information: No value filed.    Anesthesia Type:   General, Peripheral Nerve Block, For Post-op pain in coordination with surgeon     Note:  Airway :Face Mask  Patient transferred to:PACU  Comments: VSS. Breathing spontaneously at a regular rate with adequate tidal volumes and maintaining O2 sats on 6L facemask. Denies nausea or pain. No apparent complications from anesthesia.     Deacon Dyer MD  CA-1      Handoff Report: Identifed the Patient, Identified the Reponsible Provider, Reviewed the pertinent medical history, Discussed the surgical course, Reviewed Intra-OP anesthesia mangement and issues during anesthesia, Set expectations for post-procedure period and Allowed opportunity for questions and acknowledgement of understanding      Vitals: (Last set prior to Anesthesia Care Transfer)    CRNA VITALS  1/15/2020 1122 - 1/15/2020 1202      1/15/2020             Resp Rate (observed):  (!) 1                Electronically Signed By: Deacon Dyer MD  January 15, 2020  12:02 PM

## 2020-01-15 NOTE — ANESTHESIA POSTPROCEDURE EVALUATION
Anesthesia POST Procedure Evaluation    Patient: Donna Etienne   MRN:     [id number] Gender:   female   Age:    69 year old :      1950        Preoperative Diagnosis: Closed fracture of shaft of left humerus, sequela [S42.302S]   Procedure(s):  Left Remove spacer  place reverse left shoulder replacement   Postop Comments: No value filed.       Anesthesia Type:  Not documented  General, Peripheral Nerve Block, For Post-op pain in coordination with surgeon    Reportable Event: NO     PAIN: Uncomplicated   Sign Out status: Comfortable, Well controlled pain     PONV: No PONV   Sign Out status:  No Nausea or Vomiting     Neuro/Psych: Uneventful perioperative course   Sign Out Status: Preoperative baseline; Age appropriate mentation     Airway/Resp.: Uneventful perioperative course   Sign Out Status: Non labored breathing, age appropriate RR; Resp. Status within EXPECTED Parameters     CV: Uneventful perioperative course   Sign Out status: Appropriate BP and perfusion indices; Appropriate HR/Rhythm     Disposition:   Sign Out in:  PACU  Disposition:  Phase II; Home  Recovery Course: Uneventful  Follow-Up: Not required           Last Anesthesia Record Vitals:  CRNA VITALS  1/15/2020 1122 - 1/15/2020 1222      1/15/2020             Resp Rate (observed):  (!) 1          Last PACU Vitals:  Vitals Value Taken Time   /61 1/15/2020  1:15 PM   Temp 36.8  C (98.2  F) 1/15/2020  1:00 PM   Pulse 79 1/15/2020  1:15 PM   Resp 13 1/15/2020  1:16 PM   SpO2 100 % 1/15/2020  1:12 PM   Temp src     NIBP     Pulse     SpO2     Resp     Temp     Ht Rate     Temp 2     Vitals shown include unvalidated device data.      Electronically Signed By: Gabriel Hagan DO, January 15, 2020, 1:17 PM

## 2020-01-15 NOTE — ANESTHESIA PREPROCEDURE EVALUATION
Anesthesia Pre-Procedure Evaluation    Patient: Donna Etienne   MRN:     [id number] Gender:   female   Age:    69 year old :      1950        Preoperative Diagnosis: Closed fracture of shaft of left humerus, sequela [S42.302S]   Procedure(s):  Left Remove spacer  place reverse left shoulder replacement     Past Medical History:   Diagnosis Date     Asthma      DJD (degenerative joint disease)      DJD (degenerative joint disease)      Hepatitis childhood    type A     History of colon polyps      Hypercholesterolemia      Hypertension      Liver disease     Hepatits A as a child     Osteopenia      Phlebitis     post childbirth 25 years ago     PONV (postoperative nausea and vomiting)       Past Surgical History:   Procedure Laterality Date     ANGIOGRAPHY      HEART CATH     APPENDECTOMY       ARTHROPLASTY KNEE      Partial     ARTHROPLASTY KNEE      LEFT PARTIAL     C HAND/FINGER SURGERY UNLISTED      many on both     C SHOULDER SURG PROC UNLISTED  2017    rt. shoulder replacement     CL AFF SURGICAL PATHOLOGY       COLONOSCOPY       EXCISE MASS UPPER EXTREMITY  2012    Procedure: EXCISE MASS UPPER EXTREMITY;  Right Upper Arm Mass Excision;  Surgeon: Roge Do MD;  Location: [facility]     HC INCISION TENDON SHEATH FINGER       KNEE SURGERY  2017    rt. knee partical replacement     RELEASE TRIGGER FINGER      RIGHT INDEX     REMOVE HARDWARE ARTHROPLASTY SHOULDER. I&D, PLACE ANTIBIOTIC CEMENT BE Left 2019    Procedure: Left Infected Total Shoulder Arthroplasty Removal, Placement of Spacer, left humerus ORIF;  Surgeon: Jorge A Desir MD;  Location: [facility]     REPAIR TENDON FINGER(S)  2012    Procedure: REPAIR TENDON FINGER(S);  Right Index Finger Tendon Sheath Reconstruction Using Flexor  Carpi Radialis (1/2) Tendon Graft.      ;  Surgeon: Roge Do MD;  [facility]     SCAPHOID/LUNATE LIGAMENT REPAIR[       SUTURE SUSPENSIONPLASTY THUMB   2/7/2012    Procedure:SUTURE SUSPENSIONPLASTY THUMB; Left Thumb Trapezial Excision Suture Suspenionplasty  ; Surgeon:ALLA ARRIAZA; Location:[facility]     WRIST SURGERY            Anesthesia Evaluation     . Pt has had prior anesthetic. Type: General    History of anesthetic complications   - PONV        ROS/MED HX    ENT/Pulmonary:     (+)WENDY risk factors hypertension, obese, Mild Persistent asthma Treatment: Inhaled steroids and Inhaler prn,  , . .    Neurologic:  - neg neurologic ROS     Cardiovascular:     (+) Dyslipidemia, hypertension----. : . . . :. . Previous cardiac testing date:results:date: results:ECG reviewed date: results:NSR   date: results:          METS/Exercise Tolerance:  4 - Raking leaves, gardening   Hematologic:  - neg hematologic  ROS       Musculoskeletal:   (+) arthritis,  other musculoskeletal- Infected shoulder      GI/Hepatic:     (+) GERD Asymptomatic on medication,       Renal/Genitourinary:  - ROS Renal section negative       Endo:  - neg endo ROS       Psychiatric:  - neg psychiatric ROS       Infectious Disease:  - neg infectious disease ROS       Malignancy:      - no malignancy   Other:    (+) No chance of pregnancy no H/O Chronic Pain,                       PHYSICAL EXAM:   Mental Status/Neuro: A/A/O   Airway: Facies: Feasible  Mallampati: I  Mouth/Opening: Full  TM distance: > 6 cm  Neck ROM: Full   Respiratory: Auscultation: CTAB     Resp. Rate: Normal     Resp. Effort: Normal      CV: Rhythm: Regular  Rate: Age appropriate  Heart: Normal Sounds  Edema: None   Comments:      Dental: Normal Dentition                LABS:  CBC:   Lab Results   Component Value Date    WBC 7.2 09/26/2019    HGB 8.7 (L) 09/27/2019    HGB 7.4 (L) 09/26/2019    HCT 22.3 (L) 09/26/2019     (L) 09/26/2019     BMP:   Lab Results   Component Value Date     09/28/2019     09/27/2019    POTASSIUM 4.3 09/28/2019    POTASSIUM 4.4 09/27/2019    CHLORIDE 110 (H) 09/28/2019    CHLORIDE  "109 09/27/2019    CO2 27 09/28/2019    CO2 26 09/27/2019    BUN 15 09/28/2019    BUN 25 09/27/2019    CR 0.70 09/28/2019    CR 0.78 09/27/2019     (H) 09/28/2019    GLC 99 09/27/2019     COAGS: No results found for: PTT, INR, FIBR  POC:   Lab Results   Component Value Date    BGM 97 09/25/2019     OTHER:   Lab Results   Component Value Date    MARKELL 7.5 (L) 09/28/2019    MAG 2.3 09/27/2019    ALBUMIN 2.2 (L) 09/26/2019    PROTTOTAL 4.3 (L) 09/26/2019    ALT 14 09/26/2019    AST 19 09/26/2019    ALKPHOS 53 09/26/2019    BILITOTAL 0.3 09/26/2019        Preop Vitals    BP Readings from Last 3 Encounters:   10/08/19 (!) 177/88   09/29/19 132/45   09/11/19 123/72    Pulse Readings from Last 3 Encounters:   10/08/19 101   09/29/19 107   09/11/19 81      Resp Readings from Last 3 Encounters:   09/29/19 16   09/25/12 16   07/12/12 13    SpO2 Readings from Last 3 Encounters:   10/08/19 96%   09/29/19 94%   09/11/19 97%      Temp Readings from Last 1 Encounters:   10/08/19 37  C (98.6  F) (Oral)    Ht Readings from Last 1 Encounters:   09/25/19 1.613 m (5' 3.5\")      Wt Readings from Last 1 Encounters:   09/25/19 93.8 kg (206 lb 12.7 oz)    Estimated body mass index is 36.06 kg/m  as calculated from the following:    Height as of 9/25/19: 1.613 m (5' 3.5\").    Weight as of 9/25/19: 93.8 kg (206 lb 12.7 oz).     LDA:  Peripheral IV 09/25/12 Left Hand (Active)   Number of days: 2667       PICC Single Lumen 09/24/19 Right Basilic (Active)   Number of days: 112        Assessment:   ASA SCORE: 2      Smoking Status:  Non-Smoker/Unknown        Plan:   Anes. Type:  General; Peripheral Nerve Block; For Post-op pain in coordination with surgeon     Block Details: Single Shot; Exparel; Interscalene   Pre-Medication: Acetaminophen   Induction:  IV (Standard)   Airway: LMA   Access/Monitoring: PIV   Maintenance: TIVA     Postop Plan:   Postop Pain: Opioids  Postop Sedation/Airway: Not planned  Disposition: Outpatient     PONV " Management:   Adult Risk Factors: Female, H/o PONV or Motion Sickness, Non-Smoker, Postop Opioids   Prevention: Ondansetron, Dexamethasone, Scopolamine, No Volatiles     CONSENT: Direct conversation   Plan and risks discussed with: Patient   Blood Products: Consent Deferred (Minimal Blood Loss)                   Deacon Dyer MD

## 2020-01-16 ENCOUNTER — APPOINTMENT (OUTPATIENT)
Dept: OCCUPATIONAL THERAPY | Facility: CLINIC | Age: 70
DRG: 483 | End: 2020-01-16
Attending: ORTHOPAEDIC SURGERY
Payer: COMMERCIAL

## 2020-01-16 ENCOUNTER — APPOINTMENT (OUTPATIENT)
Dept: GENERAL RADIOLOGY | Facility: CLINIC | Age: 70
DRG: 483 | End: 2020-01-16
Attending: ORTHOPAEDIC SURGERY
Payer: COMMERCIAL

## 2020-01-16 LAB
ANION GAP SERPL CALCULATED.3IONS-SCNC: 3 MMOL/L (ref 3–14)
BUN SERPL-MCNC: 10 MG/DL (ref 7–30)
CALCIUM SERPL-MCNC: 7.7 MG/DL (ref 8.5–10.1)
CHLORIDE SERPL-SCNC: 107 MMOL/L (ref 94–109)
CO2 SERPL-SCNC: 28 MMOL/L (ref 20–32)
CREAT SERPL-MCNC: 0.58 MG/DL (ref 0.52–1.04)
GFR SERPL CREATININE-BSD FRML MDRD: >90 ML/MIN/{1.73_M2}
GLUCOSE SERPL-MCNC: 99 MG/DL (ref 70–99)
HGB BLD-MCNC: 10.1 G/DL (ref 11.7–15.7)
POTASSIUM SERPL-SCNC: 4.3 MMOL/L (ref 3.4–5.3)
SODIUM SERPL-SCNC: 138 MMOL/L (ref 133–144)

## 2020-01-16 PROCEDURE — 85018 HEMOGLOBIN: CPT | Performed by: ORTHOPAEDIC SURGERY

## 2020-01-16 PROCEDURE — 40000893 ZZH STATISTIC PT IP EVAL DEFER: Performed by: PHYSICAL THERAPIST

## 2020-01-16 PROCEDURE — 36415 COLL VENOUS BLD VENIPUNCTURE: CPT | Performed by: ORTHOPAEDIC SURGERY

## 2020-01-16 PROCEDURE — 97165 OT EVAL LOW COMPLEX 30 MIN: CPT | Mod: GO

## 2020-01-16 PROCEDURE — 99232 SBSQ HOSP IP/OBS MODERATE 35: CPT | Performed by: INTERNAL MEDICINE

## 2020-01-16 PROCEDURE — 97110 THERAPEUTIC EXERCISES: CPT | Mod: GO

## 2020-01-16 PROCEDURE — 12000001 ZZH R&B MED SURG/OB UMMC

## 2020-01-16 PROCEDURE — 80048 BASIC METABOLIC PNL TOTAL CA: CPT | Performed by: ORTHOPAEDIC SURGERY

## 2020-01-16 PROCEDURE — 25000128 H RX IP 250 OP 636: Performed by: STUDENT IN AN ORGANIZED HEALTH CARE EDUCATION/TRAINING PROGRAM

## 2020-01-16 PROCEDURE — 73630 X-RAY EXAM OF FOOT: CPT | Mod: LT

## 2020-01-16 PROCEDURE — 97535 SELF CARE MNGMENT TRAINING: CPT | Mod: GO

## 2020-01-16 PROCEDURE — 97530 THERAPEUTIC ACTIVITIES: CPT | Mod: GO

## 2020-01-16 PROCEDURE — 25000132 ZZH RX MED GY IP 250 OP 250 PS 637: Performed by: ORTHOPAEDIC SURGERY

## 2020-01-16 PROCEDURE — 25000132 ZZH RX MED GY IP 250 OP 250 PS 637: Performed by: INTERNAL MEDICINE

## 2020-01-16 PROCEDURE — 25000128 H RX IP 250 OP 636: Performed by: INTERNAL MEDICINE

## 2020-01-16 RX ORDER — AMOXICILLIN 250 MG
1 CAPSULE ORAL 2 TIMES DAILY
Qty: 40 TABLET | Refills: 0 | Status: SHIPPED | OUTPATIENT
Start: 2020-01-16

## 2020-01-16 RX ORDER — ACETAMINOPHEN 325 MG/1
650 TABLET ORAL EVERY 4 HOURS PRN
Qty: 1 BOTTLE | Refills: 0 | Status: SHIPPED | OUTPATIENT
Start: 2020-01-18 | End: 2020-01-16

## 2020-01-16 RX ORDER — HYDROMORPHONE HYDROCHLORIDE 2 MG/1
2-4 TABLET ORAL EVERY 4 HOURS PRN
Qty: 30 TABLET | Refills: 0 | Status: SHIPPED | OUTPATIENT
Start: 2020-01-16 | End: 2020-01-17

## 2020-01-16 RX ORDER — ACETAMINOPHEN 325 MG/1
650 TABLET ORAL EVERY 4 HOURS PRN
Qty: 1 BOTTLE | Refills: 0 | Status: SHIPPED | OUTPATIENT
Start: 2020-01-18

## 2020-01-16 RX ORDER — KETOROLAC TROMETHAMINE 30 MG/ML
30 INJECTION, SOLUTION INTRAMUSCULAR; INTRAVENOUS ONCE
Status: COMPLETED | OUTPATIENT
Start: 2020-01-16 | End: 2020-01-16

## 2020-01-16 RX ORDER — KETOROLAC TROMETHAMINE 15 MG/ML
15 INJECTION, SOLUTION INTRAMUSCULAR; INTRAVENOUS EVERY 6 HOURS PRN
Status: DISCONTINUED | OUTPATIENT
Start: 2020-01-16 | End: 2020-01-17 | Stop reason: HOSPADM

## 2020-01-16 RX ADMIN — METOPROLOL TARTRATE 25 MG: 25 TABLET ORAL at 21:49

## 2020-01-16 RX ADMIN — HYDROMORPHONE HYDROCHLORIDE 4 MG: 2 TABLET ORAL at 17:46

## 2020-01-16 RX ADMIN — ACETAMINOPHEN 975 MG: 325 TABLET, FILM COATED ORAL at 07:52

## 2020-01-16 RX ADMIN — ACETAMINOPHEN 975 MG: 325 TABLET, FILM COATED ORAL at 15:51

## 2020-01-16 RX ADMIN — CEFAZOLIN SODIUM 2 G: 2 INJECTION, SOLUTION INTRAVENOUS at 01:54

## 2020-01-16 RX ADMIN — HYDROMORPHONE HYDROCHLORIDE 4 MG: 2 TABLET ORAL at 05:49

## 2020-01-16 RX ADMIN — HYDROMORPHONE HYDROCHLORIDE 4 MG: 2 TABLET ORAL at 21:48

## 2020-01-16 RX ADMIN — METOPROLOL TARTRATE 25 MG: 25 TABLET ORAL at 07:53

## 2020-01-16 RX ADMIN — KETOROLAC TROMETHAMINE 30 MG: 30 INJECTION, SOLUTION INTRAMUSCULAR; INTRAVENOUS at 08:19

## 2020-01-16 RX ADMIN — AMLODIPINE BESYLATE 5 MG: 5 TABLET ORAL at 07:53

## 2020-01-16 RX ADMIN — HYDROMORPHONE HYDROCHLORIDE 2 MG: 2 TABLET ORAL at 01:15

## 2020-01-16 RX ADMIN — SENNOSIDES AND DOCUSATE SODIUM 2 TABLET: 8.6; 5 TABLET ORAL at 08:10

## 2020-01-16 RX ADMIN — SIMVASTATIN 20 MG: 20 TABLET, FILM COATED ORAL at 21:49

## 2020-01-16 RX ADMIN — SENNOSIDES AND DOCUSATE SODIUM 2 TABLET: 8.6; 5 TABLET ORAL at 21:48

## 2020-01-16 NOTE — PROGRESS NOTES
Patient was fit with a size medium ultra sling 4. Fit and function appear adequate at this time.   ELISSA Mills.

## 2020-01-16 NOTE — PROGRESS NOTES
"Orthopaedic Surgery Progress Note    Subjective: No acute events overnight. Pain controlled.  Tolerating diet.    Denies fever or chills, CP, SOB, numbness or tingling, motor dysfunction or weakness.    Objective: /59   Pulse 110   Temp 97.5  F (36.4  C) (Oral)   Resp 16   Ht 1.615 m (5' 3.6\")   Wt 92.1 kg (203 lb 0.7 oz)   SpO2 91%   BMI 35.29 kg/m      Physical Exam:  General: healthy, alert, no distress  Respiratory: Breathing not labored.  MSK:  Focused examination of:   LUE: Fingers WWP with BCR, + radial pulse. Fires EPL/FPL/IO. +Deltoid. SILT Ax/M/R/U . Dressing c/d/i.     Labs:   Recent Labs   Lab 01/15/20  0726   HGB 12.9       I/O last 3 completed shifts:  In: 1171 [P.O.:50; I.V.:1121]  Out: 550 [Urine:400; Drains:150]    All cultures:  No results for input(s): CULT in the last 168 hours.    Assessment:  Donna Etienne is a 69 year old female who is:    Procedures:  1. 1/15: Left TSA    Orthopaedics Primary; Hospitalist co-management  Activity: up with assist  Weight bearing status: NWB, No ROM  Antibiotics: Ancef  Diet: regular  DVT prophylaxis: none  Imaging: left foot XR  Bracing/Splinting: sling until clinic  Dressings: Keep clean, dry and intact until clinic  Elevation: Elevate LUE on pillows to keep at the level of the heart while in bed  Drains: 140 per shift; will be removed with <30 per shift  Physical Therapy/Occupational Therapy: Eval and treat for mobilization and ambulation    Follow-up: Clinic with Dr. Desir in 2 weeks    Disposition: Pending progress with therapies, pain control on orals, and medical stability, anticipate discharge  POD 2-3    Vargas Ellison MD  Orthopaedic Resident, PGY-4  Pager: [phone]    "

## 2020-01-16 NOTE — PROGRESS NOTES
Patient was seen, course reviewed with orthopedics and nursing staff.    Patient notes poor pain control this morning with oral Dilaudid.    She denies cough, chest pain, shortness of breath, nausea, abdominal pain.    Low-grade temperature  Blood pressure 100s to 110s  Blood pressure normal  O2 sats low 90s room air    Patient is alert fully oriented, appears mildly uncomfortable secondary to shoulder pain    Lungs clear  CV regular rhythm  Abdomen soft, nondistended, nontender  No calf edema or tenderness.    Results for KORIN HOOKS (MRN [id number]) as of 1/16/2020 10:15   Ref. Range 1/16/2020 06:45   Sodium Latest Ref Range: 133 - 144 mmol/L 138   Potassium Latest Ref Range: 3.4 - 5.3 mmol/L 4.3   Chloride Latest Ref Range: 94 - 109 mmol/L 107   Carbon Dioxide Latest Ref Range: 20 - 32 mmol/L 28   Urea Nitrogen Latest Ref Range: 7 - 30 mg/dL 10   Creatinine Latest Ref Range: 0.52 - 1.04 mg/dL 0.58   GFR Estimate Latest Ref Range: >60 mL/min/1.73_m2 >90   GFR Estimate If Black Latest Ref Range: >60 mL/min/1.73_m2 >90   Calcium Latest Ref Range: 8.5 - 10.1 mg/dL 7.7 (L)   Anion Gap Latest Ref Range: 3 - 14 mmol/L 3   Glucose Latest Ref Range: 70 - 99 mg/dL 99   Hemoglobin Latest Ref Range: 11.7 - 15.7 g/dL 10.1 (L)       Assessment    Status post removal left shoulder implant with left shoulder reverse total shoulder arthroplasty.  Patient has been stable postoperative though is having considerable pain this morning    Acute blood loss anemia, mild, not clinically significant    Hypertension, stable on Metoprolol and amlodipine    Asthma, stable    Plan  Trial of Ketorolac  Continue po Dilaudid  Metoprolol and amlodipine with hold parameters  Continue current inhalers  Prob discharge tomorrow if pain controlled.  Discussed with .

## 2020-01-16 NOTE — PLAN OF CARE
Pt A/O X 4. Oral temperature 100.7, IS encouraged,  notified, and scheduled Tylenol given, oral temp recheck 108/55. VSS. Lungs-Clear bilaterally with both anterior and posterior. Bowels-Hyperactive in all four quadrants. Voids spontaneously without difficulty in the bathroom. Denies nausea and vomiting. CMS and Neuro's are intact. Slight numbness and tingling in the Left hand. Had pain in the left shoulder and given scheduled Toradol, ICE applied, and pain is manageable. Is on a Regular diet and appetite was Good this shift. Left shoulder incisional dressing is C/D/I. Hemovac drain is patent with 10ml's output this shift, new bag applied. Pt up in room with SBA and a cane. PIV patent in the right hand and saline locked. SHERRON's and PCD's on BLE's. Bilateral heels are elevated off the bed. Pt is able to make needs known, and call light is within reach. Continue to monitor.

## 2020-01-16 NOTE — PLAN OF CARE
Discharge Planner PT   Patient plan for discharge: home with A from spouse and dtr  Current status: PT order's received, chart reviewed and discussed with OT.  Pt familiar with post op precautions and recovery process, mob with SBA with plans to return home with family to A for household task and ADL's prn,  No acute care PT needs identified. OT following  Barriers to return to prior living situation: defer to OT  Recommendations for discharge: defer to OT  Rationale for recommendations: defer to OT       Entered by: QUINTEN VERMA 01/16/2020 1:27 PM

## 2020-01-16 NOTE — PLAN OF CARE
Patient A/Ox4. VSS. Denies CP, SOB, dizziness/LH (had a little dizziness on eves). LSCTA. +fl/BS. Voiding well in bathroom. CMS intact ex some numbness from block that is wearing off quickly. Dressing to shoulder CDI, ice applied. Tolerating regular diet without NV. IS encouraged. Activity level is good, pt up in room a couple of times thus far. IV infusing fluids, will SL soon. Pain rated as tolerable throughout shift, pt taking oral dilaudid PRN for pain. Patient has demonstrated ability to call appropriately. Patient is resting with call light within reach. Will continue to monitor.

## 2020-01-16 NOTE — PLAN OF CARE
Patient is AxO. VSS with the exception of  and slightly elevated temp. See flowsheets. Patient denies any chest pain, SOB, new numbness or tingling. PIV is saline locked. Patient is voiding adequate amounts by ambulating to the bathroom SBA with cane. BM yesterday and patient reports passing gas. Bowel sounds active. Patient reports pain has been well managed since Toradol this AM. Scheduled tylenol given. Declines the need for other interventions. Ice applied to the left ankle as patient states she twisted it before admission. Tolerating regular diet. Dressing to the left shoulder is CDI. Drain is in place and patent. Immobilizer in place. Patient was able to use call light to make needs known. Frequent rounding complete. Continue POC.

## 2020-01-16 NOTE — PROGRESS NOTES
"   01/16/20 0900   Quick Adds   Type of Visit Initial Occupational Therapy Evaluation   Living Environment   Lives With spouse;child(radha), adult   Living Arrangements house   Home Accessibility stairs to enter home   Number of Stairs, Main Entrance 4   Transportation Anticipated family or friend will provide   Living Environment Comment Patient reports  and daughter can assist. Laundry is in basement, does not have to do initially.    Self-Care   Usual Activity Tolerance good   Current Activity Tolerance moderate   Equipment Currently Used at Home cane, straight   Functional Level   Ambulation 1-->assistive equipment  (occasionally uses cane )   Transferring 0-->independent   Toileting 0-->independent   Bathing 0-->independent   Dressing 0-->independent   Eating 0-->independent   Communication 0-->understands/communicates without difficulty   Swallowing 0-->swallows foods/liquids without difficulty   Cognition 0 - no cognition issues reported   Fall history within last six months yes   Number of times patient has fallen within last six months 1  (fell on Tues and hurt her L foot)   General Information   Onset of Illness/Injury or Date of Surgery - Date 01/15/20   Referring Physician Dr. Desir   Patient/Family Goals Statement \"Get back to doing what I used to\"   Additional Occupational Profile Info/Pertinent History of Current Problem removal of abx spacer L shoulder and s/p L reverse TSA   Precautions/Limitations fall precautions;other (see comments)  (no shoulder ROM )   Weight-Bearing Status - LUE nonweight-bearing   Cognitive Status Examination   Orientation orientation to person, place and time   Cognitive Comment no concerns   Visual Perception   Visual Perception Wears glasses   Sensory Examination   Sensory Comments NT from block    Pain Assessment   Patient Currently in Pain Yes, see Vital Sign flowsheet   Range of Motion (ROM)   ROM Comment R UE WFL. L UE not tested secondary to precautions  "   Strength   Strength Comments R UE WFL. L UE not tested secondary to precautions    Muscle Tone Assessment   Muscle Tone Quick Adds No deficits were identified   Coordination   Upper Extremity Coordination Left UE impaired   Functional Limitations Impaired ability to perform bilateral tasks   Mobility   Bed Mobility Comments IND   Transfer Skill: Bed to Chair/Chair to Bed   Level of [place]: Bed to Chair contact guard   Physical Assist/Nonphysical Assist: Bed to Chair set-up required;verbal cues   Transfer Skill: Sit to Stand   Level of [place]: Sit/Stand contact guard   Physical Assist/Nonphysical Assist: Sit/Stand set-up required;verbal cues   Transfer Skill: Toilet Transfer   Level of [place]: Toilet stand-by assist   Physical Assist/Nonphysical Assist: Toilet set-up required;supervision   Upper Body Dressing   Level of [place]: Dress Upper Body moderate assist (50% patients effort)   Physical Assist/Nonphysical Assist: Dress Upper Body set-up required;verbal cues   Lower Body Dressing   Level of [place]: Dress Lower Body minimum assist (75% patients effort)   Physical Assist/Nonphysical Assist: Dress Lower Body set-up required;verbal cues   Toileting   Level of [place]: Toilet stand-by assist   Physical Assist/Nonphysical Assist: Toilet supervision   Grooming   Level of [place]: Grooming stand-by assist   Physical Assist/Nonphysical Assist: Grooming set-up required   Eating/Self Feeding   Level of [place]: Eating independent   Activities of Daily Living Analysis   Impairments Contributing to Impaired Activities of Daily Living balance impaired;pain;post surgical precautions;ROM decreased;strength decreased   General Therapy Interventions   Planned Therapy Interventions ADL retraining   Clinical Impression   Criteria for Skilled Therapeutic Interventions Met yes, treatment indicated   OT Diagnosis Decreased functional mobility and ADLs   Influenced by the following  "impairments pain, post op precautions   Assessment of Occupational Performance 3-5 Performance Deficits   Identified Performance Deficits dressing, bathing, toileting, home mgmt   Clinical Decision Making (Complexity) Low complexity   Therapy Frequency Daily   Predicted Duration of Therapy Intervention (days/wks) 2 days   Anticipated Discharge Disposition Home with Assist   Risks and Benefits of Treatment have been explained. Yes   Patient, Family & other staff in agreement with plan of care Yes   [facility] TM \"6 Clicks\"   2016, Trustees of [facility], under license to [employer].  All rights reserved.   6 Clicks Short Forms Daily Activity Inpatient Short Form   [facility]  \"6 Clicks\" Daily Activity Inpatient Short Form   1. Putting on and taking off regular lower body clothing? 3 - A Little   2. Bathing (including washing, rinsing, drying)? 3 - A Little   3. Toileting, which includes using toilet, bedpan or urinal? 4 - None   4. Putting on and taking off regular upper body clothing? 2 - A Lot   5. Taking care of personal grooming such as brushing teeth? 4 - None   6. Eating meals? 4 - None   Daily Activity Raw Score (Score out of 24.Lower scores equate to lower levels of function) 20   Total Evaluation Time   Total Evaluation Time (Minutes) 8     "

## 2020-01-16 NOTE — PLAN OF CARE
Discharge Planner OT   Patient plan for discharge: home with assist from spouse and daughter  Current status: patient familiar to writer from previous surgery. Patient is familiar with post op precautions and impact on ADLs. Patient reports she fell on Tues hurting her foot, plan is for xray today. Advised patient to limit walking until xray results. Patient did need to use bathroom, ambulated to/from bathroom with CGA. Patient has cane that she uses occasionally at baseline,  to bring in. Completed toilet task with SBA. Completed elbow, wrist, hand HEP. Patient's sling appears too small, placed call to orthotics to come check for proper fit.   Barriers to return to prior living situation: pain, post op precautions  Recommendations for discharge: home with assist for ADLs prn/household tasks  Rationale for recommendations: will see daily for OT to review ADLs as needed. Do not anticipate much need for ongoing OT as patient is familiar with rehab process/precautions due to recent shoulder surgery,  assists.        Entered by: JAZMYN MONTAÑO 01/16/2020 9:44 AM

## 2020-01-17 ENCOUNTER — APPOINTMENT (OUTPATIENT)
Dept: OCCUPATIONAL THERAPY | Facility: CLINIC | Age: 70
DRG: 483 | End: 2020-01-17
Attending: ORTHOPAEDIC SURGERY
Payer: COMMERCIAL

## 2020-01-17 VITALS
SYSTOLIC BLOOD PRESSURE: 131 MMHG | TEMPERATURE: 100 F | OXYGEN SATURATION: 94 % | HEART RATE: 106 BPM | DIASTOLIC BLOOD PRESSURE: 58 MMHG | HEIGHT: 64 IN | BODY MASS INDEX: 34.66 KG/M2 | RESPIRATION RATE: 16 BRPM | WEIGHT: 203.04 LBS

## 2020-01-17 LAB — HGB BLD-MCNC: 9.6 G/DL (ref 11.7–15.7)

## 2020-01-17 PROCEDURE — 36415 COLL VENOUS BLD VENIPUNCTURE: CPT | Performed by: ORTHOPAEDIC SURGERY

## 2020-01-17 PROCEDURE — 97530 THERAPEUTIC ACTIVITIES: CPT | Mod: GO

## 2020-01-17 PROCEDURE — 97535 SELF CARE MNGMENT TRAINING: CPT | Mod: GO

## 2020-01-17 PROCEDURE — 99232 SBSQ HOSP IP/OBS MODERATE 35: CPT | Performed by: INTERNAL MEDICINE

## 2020-01-17 PROCEDURE — 85018 HEMOGLOBIN: CPT | Performed by: ORTHOPAEDIC SURGERY

## 2020-01-17 PROCEDURE — 25000132 ZZH RX MED GY IP 250 OP 250 PS 637: Performed by: ORTHOPAEDIC SURGERY

## 2020-01-17 PROCEDURE — 99207 ZZC CDG-MDM COMPONENT: MEETS MODERATE - UP CODED: CPT | Performed by: INTERNAL MEDICINE

## 2020-01-17 PROCEDURE — 25000132 ZZH RX MED GY IP 250 OP 250 PS 637: Performed by: INTERNAL MEDICINE

## 2020-01-17 RX ORDER — TRAMADOL HYDROCHLORIDE 50 MG/1
50-100 TABLET ORAL EVERY 6 HOURS PRN
Qty: 60 TABLET | Refills: 0 | Status: SHIPPED | OUTPATIENT
Start: 2020-01-17 | End: 2020-01-17

## 2020-01-17 RX ORDER — TRAMADOL HYDROCHLORIDE 50 MG/1
50-100 TABLET ORAL EVERY 6 HOURS PRN
Qty: 60 TABLET | Refills: 0 | Status: SHIPPED | OUTPATIENT
Start: 2020-01-17 | End: 2020-09-08

## 2020-01-17 RX ORDER — DIPHENHYDRAMINE HCL 25 MG
25 CAPSULE ORAL EVERY 6 HOURS PRN
Status: DISCONTINUED | OUTPATIENT
Start: 2020-01-17 | End: 2020-01-17 | Stop reason: HOSPADM

## 2020-01-17 RX ORDER — METOPROLOL TARTRATE 25 MG/1
25 TABLET, FILM COATED ORAL 2 TIMES DAILY
COMMUNITY
Start: 2020-01-17

## 2020-01-17 RX ORDER — TRAMADOL HYDROCHLORIDE 50 MG/1
50-100 TABLET ORAL EVERY 6 HOURS PRN
Status: DISCONTINUED | OUTPATIENT
Start: 2020-01-17 | End: 2020-01-17 | Stop reason: HOSPADM

## 2020-01-17 RX ADMIN — HYDROMORPHONE HYDROCHLORIDE 4 MG: 2 TABLET ORAL at 01:19

## 2020-01-17 RX ADMIN — ACETAMINOPHEN 975 MG: 325 TABLET, FILM COATED ORAL at 08:11

## 2020-01-17 RX ADMIN — HYDROMORPHONE HYDROCHLORIDE 4 MG: 2 TABLET ORAL at 05:55

## 2020-01-17 RX ADMIN — DIPHENHYDRAMINE HYDROCHLORIDE 25 MG: 25 CAPSULE ORAL at 08:28

## 2020-01-17 RX ADMIN — METOPROLOL TARTRATE 25 MG: 25 TABLET ORAL at 08:11

## 2020-01-17 RX ADMIN — AMLODIPINE BESYLATE 5 MG: 5 TABLET ORAL at 08:10

## 2020-01-17 RX ADMIN — ACETAMINOPHEN 975 MG: 325 TABLET, FILM COATED ORAL at 01:19

## 2020-01-17 RX ADMIN — TRAMADOL HYDROCHLORIDE 50 MG: 50 TABLET ORAL at 12:04

## 2020-01-17 NOTE — PROGRESS NOTES
Patient was seen, case reviewed with nursing staff and orthopedics.    Patient feels well, feels ready to discharge to home this morning.    She notes generalized itching, recalls that this has been a problem with narcotics in the past.  She would like to start tramadol which was well-tolerated following her most recent shoulder surgery and on which she was discharged.    Shoulder pain has been controlled.  Appetite good.  She is voiding without difficulty.      Vital signs stable  Intermittent low-grade temperature  Heart rate 100s blood pressure 120s systolic  O2 saturations mid 90s on room air    Alert, fully oriented, good spirits.  Lungs clear.  CV regular rhythm.    Abdomen soft, nontender, nondistended.    No lower extremity edema.      Results for KORIN HOOKS (MRN [id number]) as of 1/17/2020 14:49   Ref. Range 1/16/2020 06:45 1/16/2020 14:49 1/17/2020 06:09   Hemoglobin Latest Ref Range: 11.7 - 15.7 g/dL 10.1 (L)  9.6 (L)       Assessment    Status post removal left shoulder implant with left shoulder reverse total shoulder arthroplasty.    Pain controlled.    Acute blood loss anemia, mild, not clinically significant     Hypertension, stable on Metoprolol and amlodipine     Asthma, stable       Plan:  Discharge to home this morning.  Tramadol ordered for pain and discharge.  Orders reviewed.

## 2020-01-17 NOTE — PLAN OF CARE
Discharge Planner OT   Patient plan for discharge: home today   Current status: Patient with 5th MT fracture in L foot, educated patient that per NP, she should wear hard sole shoe and can WBAT, patient verbalized understanding. Patient ambulated in hallway with shoes on using SEC with SBA. Patient declined need to review any ADLs as she is familiar from previous surgeries.   Barriers to return to prior living situation: none from OT standpoint  Recommendations for discharge: home with assist from  as needed for ADLs/household tasks  Rationale for recommendations: patient has met inpatient OT goals, understands precautions, and will have assist as needed.        Entered by: JAZMYN MONTAÑO 01/17/2020 9:45 AM    Occupational Therapy Discharge Summary    Reason for therapy discharge:    Discharged to home.    Progress towards therapy goal(s). See goals on Care Plan in [facility] electronic health record for goal details.  Most goals met, all goals addressed.      Therapy recommendation(s):    No further therapy is recommended.  Home with assist from .

## 2020-01-17 NOTE — PROGRESS NOTES
"Orthopaedic Surgery Progress Note    Subjective: No acute events overnight. Pain controlled.  Tolerating diet.  XR of left foot shows a 5th MT base fx.    Denies fever or chills, CP, SOB, numbness or tingling, motor dysfunction or weakness.    Objective: /63 (BP Location: Right arm)   Pulse 106   Temp 98.2  F (36.8  C) (Oral)   Resp 16   Ht 1.615 m (5' 3.6\")   Wt 92.1 kg (203 lb 0.7 oz)   SpO2 94%   BMI 35.29 kg/m      Physical Exam:  General: healthy, alert, no distress  Respiratory: Breathing not labored.  MSK:  Focused examination of:   LUE: Fingers WWP with BCR, + radial pulse. Fires EPL/FPL/IO. +Deltoid. SILT Ax/M/R/U . Dressing c/d/i.     Labs:   Recent Labs   Lab 01/17/20  0609 01/16/20  0645 01/15/20  0726   HGB 9.6* 10.1* 12.9       I/O last 3 completed shifts:  In: -   Out: 130 [Drains:130]    All cultures:  No results for input(s): CULT in the last 168 hours.    Assessment:  Donna Etienne is a 69 year old female who is:    Procedures:  1. 1/15: Left TSA    Orthopaedics Primary; Hospitalist co-management  Activity: up with assist; post-op shoe left foot  Weight bearing status: NWB, No ROM  Antibiotics: Ancef  Diet: regular  DVT prophylaxis: none  Imaging: done  Bracing/Splinting: sling until clinic  Dressings: Keep clean, dry and intact until clinic  Elevation: Elevate LUE on pillows to keep at the level of the heart while in bed  Drains: 50 per shift; will be removed with <30 per shift  Physical Therapy/Occupational Therapy: Eval and treat for mobilization and ambulation    Follow-up: Clinic with Dr. Desir in 2 weeks    Disposition: Pending progress with therapies, pain control on orals, and medical stability, anticipate discharge today or tomorrow    Vargas Ellison MD  Orthopaedic Resident  Pager: [phone]    "

## 2020-01-17 NOTE — PROGRESS NOTES
A/Ox's 4. Pt rated pain as tolerable. Dilaudid and Ice packs given for pain control. Dressing CDI. CMS intact. Tolerated regular diet. Denied any nausea, CP, SOB, lightheadedness or dizziness. Voiding without pain or difficulty. Passing flatus. Up with SBA and moyer. Resting in bed at this time with call light in reach. Able to make needs known. Continue to monitor.

## 2020-01-17 NOTE — PROGRESS NOTES
Care Coordinator Progress Note    Admission Date/Time:  1/15/2020  Attending MD:  Jorge A Desir*    Data  Chart reviewed, discussed with interdisciplinary team.   Patient was admitted for:    Closed fracture of shaft of left humerus, sequela  Closed fracture of shaft of left humerus, sequela  Status post shoulder surgery.       Assessment  Per chart review no home care or outpatient physical therapy needs anticipated at this time. RNCC available as needed.     Plan  Anticipated Discharge Date:  1/17/2020  Anticipated Discharge Plan:  Home    Janel Mckeon RN, BSN  Care Coordinator, 8A  Phone [phone]  Pager [phone]

## 2020-01-17 NOTE — PLAN OF CARE
Pt A/O X 4. Oral temperature 100, Tylenol given, and IS encouraged. VSS. Lungs-Clear bilaterally with both anterior and posterior. Bowels-Hyperactive in all four quadrants. Voids spontaneously without difficulty in the bathroom. Denies nausea and vomiting. CMS and Neuro's are intact. Denies numbness and tingling in all extremities. Has pain in the left shoulder and given PRN Tramadol, and pain is manageable. Is on a Regular diet and appetite was Good this shift. Left shoulder incisional dressing is C/D/I. Pt up in room with SBA and a cane. PIV removed for discharge. Bilateral heels are elevated off the bed. Pt is able to make needs known, and call light is within reach. Pt. discharged at 12:45 to home, was accompanied by her spouse, and left with personal belongings. Pt. received complete discharge paperwork and PO medications as filled by discharge pharmacy. Pt given script for Tramadol to take with her. Pt. was given times of last dose for all discharge medications in writing on discharge medication sheets. Discharge teaching included PO medication, pain management, activity restrictions, dressing changes, and signs and symptoms of infection. Pt. to follow up with  within 2 weeks. Pt. had no further questions at the time of discharge and no unmet needs were identified.

## 2020-01-20 ENCOUNTER — PATIENT OUTREACH (OUTPATIENT)
Dept: CARE COORDINATION | Facility: CLINIC | Age: 70
End: 2020-01-20

## 2020-01-20 DIAGNOSIS — S42.302S CLOSED FRACTURE OF SHAFT OF LEFT HUMERUS, SEQUELA: Primary | ICD-10-CM

## 2020-01-20 RX ORDER — HYDROXYZINE HYDROCHLORIDE 10 MG/1
10 TABLET, FILM COATED ORAL 3 TIMES DAILY PRN
Qty: 30 TABLET | Refills: 0 | Status: SHIPPED | OUTPATIENT
Start: 2020-01-20 | End: 2020-09-08

## 2020-01-20 NOTE — PROGRESS NOTES
Patient has questions about her after care for her broken foot   Please call to discuss questions about braces and activity    Also she is waiting for someone to call her to schedule her 2 week appointment   She has a 6 week but not a 2 week appointment

## 2020-01-20 NOTE — PROGRESS NOTES
Patient states she has been wearing a supportive shoe and using a cane.  She reports she was told she can bear weight  as tolerated on the foot with the fracture.  She was encouraged to rest and elevate the foot when possible.    Patient states she has been taking Benadryl because she has swelling in the face after taking Tramadol.  No complaints of difficulty breathing or swallowing.  She states swelling is decreasing.   She reports she has taken Hydroxyzine in the past for pain control.  Dr Desir will be consulted concerning the medication request.

## 2020-02-03 ENCOUNTER — OFFICE VISIT (OUTPATIENT)
Dept: ORTHOPEDICS | Facility: CLINIC | Age: 70
End: 2020-02-03
Payer: COMMERCIAL

## 2020-02-03 DIAGNOSIS — Z96.619 PROSTHETIC SHOULDER INFECTION, INITIAL ENCOUNTER (H): Primary | ICD-10-CM

## 2020-02-03 DIAGNOSIS — T84.59XA PROSTHETIC SHOULDER INFECTION, INITIAL ENCOUNTER (H): Primary | ICD-10-CM

## 2020-02-03 NOTE — LETTER
2/3/2020       RE: Donna Etienne  [address] [facility] [address]     Dear Colleague,    Thank you for referring your patient, Donna Etienne, to the [facility] at [facility]. Please see a copy of my visit note below.    S:  Doing well.    O:  Incision clean, dry, and intact  Sets Deltoid  Wiggles fingers  Warm and well-perfused hand with palpable pulse    A/P:  Doing well  Advance per op report      Again, thank you for allowing me to participate in the care of your patient.      Sincerely,    Jorge A Desir MD

## 2020-02-03 NOTE — NURSING NOTE
Reason For Visit:   Chief Complaint   Patient presents with     Surgical Followup     DOS 1/15/20 S/P Left shoulder spacer removal, Left reverse TSA      PCP: Puma Joseph        ? No  Occupation [employer] .  Currently working? Yes.  Work status?  Full time. Per what she can do  Date of injury: None  Date of surgery: 3/2017   Type of surgery: Right shoulder replacement  Date of surgery: 7/2018  Type of surgery: Left shoulder replacement  Date of surgery 12/2018  Type of surgery Left shoulder surgery. States it was another replacement  Date of surgery: 9/25/19  Type of surgery:   1.  Left shoulder:  Removal of total shoulder arthroplasty.   2.  Left shoulder radical debridement of the bone of the humerus for osteomyelitis.   3.  Left shoulder open reduction internal fixation of humeral shaft fracture  Smoker: No     Right hand dominant    SANE score  Affected shoulder: Left   Right shoulder SANE: 85  Left shoulder SANE: 0        Pain Assessment  Patient Currently in Pain: Justine Nieves LPN

## 2020-02-04 ENCOUNTER — DOCUMENTATION ONLY (OUTPATIENT)
Dept: CARE COORDINATION | Facility: CLINIC | Age: 70
End: 2020-02-04

## 2020-02-08 ENCOUNTER — HEALTH MAINTENANCE LETTER (OUTPATIENT)
Age: 70
End: 2020-02-08

## 2020-02-19 NOTE — PROGRESS NOTES
Note Date: 10/21/2019      I corresponded with Dr. Aiken about Ms. Etienne.  We agreed that Ms. Etienne should have preoperative antibiotics, but also get specimens obtained at the time of her reimplantation.  We are currently awaiting feedback from [facility] [place] about the possibility of getting treatment with a custom glenoid component.  We will know more when that information arises.         MICHELLE DE MD             D: 2020   T: 2020   MT: PP      Name:     KORIN ETIENNE   MRN:      [id number]        Account:      [id number]   :      1950           Note Date: 10/21/2019      Document: [id number]

## 2020-04-06 ENCOUNTER — VIRTUAL VISIT (OUTPATIENT)
Dept: ORTHOPEDICS | Facility: CLINIC | Age: 70
End: 2020-04-06
Payer: COMMERCIAL

## 2020-04-06 DIAGNOSIS — T84.59XA PROSTHETIC SHOULDER INFECTION, INITIAL ENCOUNTER (H): Primary | ICD-10-CM

## 2020-04-06 DIAGNOSIS — Z96.619 PROSTHETIC SHOULDER INFECTION, INITIAL ENCOUNTER (H): Primary | ICD-10-CM

## 2020-04-06 NOTE — PROGRESS NOTES
Did a telephone call as patient could not get a video visit downloaded.      No swelling and patient reports that the incision looks good.     Feels like the muscles are still painful when using the arm especially over the head with her comb or with her hairdryer.    Advised her to start on her home exercises (known from prior surgery) and see me back in three months with XR.

## 2020-04-08 ENCOUNTER — TELEPHONE (OUTPATIENT)
Dept: ORTHOPEDICS | Facility: CLINIC | Age: 70
End: 2020-04-08

## 2020-07-31 DIAGNOSIS — M25.512 LEFT SHOULDER PAIN, UNSPECIFIED CHRONICITY: Primary | ICD-10-CM

## 2020-08-10 ENCOUNTER — OFFICE VISIT (OUTPATIENT)
Dept: ORTHOPEDICS | Facility: CLINIC | Age: 70
End: 2020-08-10
Payer: COMMERCIAL

## 2020-08-10 ENCOUNTER — ANCILLARY PROCEDURE (OUTPATIENT)
Dept: GENERAL RADIOLOGY | Facility: CLINIC | Age: 70
End: 2020-08-10
Attending: ORTHOPAEDIC SURGERY
Payer: COMMERCIAL

## 2020-08-10 DIAGNOSIS — M54.50 LOW BACK PAIN, UNSPECIFIED BACK PAIN LATERALITY, UNSPECIFIED CHRONICITY, UNSPECIFIED WHETHER SCIATICA PRESENT: ICD-10-CM

## 2020-08-10 DIAGNOSIS — M25.512 LEFT SHOULDER PAIN, UNSPECIFIED CHRONICITY: ICD-10-CM

## 2020-08-10 DIAGNOSIS — M25.512 LEFT SHOULDER PAIN, UNSPECIFIED CHRONICITY: Primary | ICD-10-CM

## 2020-08-10 NOTE — NURSING NOTE
Reason For Visit:   Chief Complaint   Patient presents with     Surgical Followup     DOS 1/15/20 S/P Left shoulder spacer removal, Left reverse TSA    PCP: Puma Joseph        ? No  Occupation [employer] .  Currently working? Yes.  Work status?  Full time. Per what she can do  Date of injury: None  Date of surgery: 3/2017   Type of surgery: Right shoulder replacement  Date of surgery: 7/2018  Type of surgery: Left shoulder replacement  Date of surgery 12/2018  Type of surgery Left shoulder surgery. States it was another replacement  Date of surgery: 9/25/19  Type of surgery:   1.  Left shoulder:  Removal of total shoulder arthroplasty.   2.  Left shoulder radical debridement of the bone of the humerus for osteomyelitis.   3.  Left shoulder open reduction internal fixation of humeral shaft fracture  Smoker: No     Right hand dominant      SANE score  Affected shoulder: Left   Right shoulder SANE: 80  Left shoulder SANE: 60    There were no vitals taken for this visit.      Pain Assessment  Patient Currently in Pain: Justine Nieves LPN

## 2020-08-10 NOTE — PROGRESS NOTES
CHIEF COMPLAINT:  Left shoulder status post spacer removal with custom reverse total shoulder arthroplasty 01/15/2020.      HISTORY OF PRESENT ILLNESS:  Ms. Etienne returns today for followup.  She is seen today with her .  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  She also had an episode where she fell where she twisted her foot and it hurt.  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  She is now out of her walking boot and her wrist brace and she is feeling more like herself.      She notes the shoulder is improving.  She still has trouble sleeping on either side because her shoulder hurts when she gets up.  She notes that it is working, but she wishes she had more strength and function in it.      PHYSICAL EXAMINATION:  On exam today, she has 85 degrees of active forward elevation and 10 degrees of external rotation at the side.  She has internal rotation of the back to the gluteus.  Abduction is to 85 degrees.       IMAGING:  Radiographs show an intact reverse total shoulder arthroplasty in good position with a custom augmented glenoid component.      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      PLAN:  I had a nice talk with Ms. Etienne today.  I told her that I thought she was doing well and she can start on a course of physical therapy.  I will see her back at the next anniversary of her surgery or sooner should any additional problems arise.        Of note, she continues to have numbness in her foot and would like nonoperative management.  She has a bad history of reaction to previous shoulder injections of corticosteroid and would like to avoid corticosteroid injections.  The persistent numbness is quite concerning to her and to me.     TT: 16mins  CT: 11 mins

## 2020-08-10 NOTE — LETTER
8/10/2020         RE: Donna Etienne  [id number] [facility] [address]        Dear Colleague,    Thank you for referring your patient, Donna Etienne, to the [facility]TH[facility]. Please see a copy of my visit note below.    CHIEF COMPLAINT:  Left shoulder status post spacer removal with custom reverse total shoulder arthroplasty 01/15/2020.      HISTORY OF PRESENT ILLNESS:  Ms. Etienne returns today for followup.  She is seen today with her .  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  She also had an episode where she fell where she twisted her foot and it hurt.  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  She is now out of her walking boot and her wrist brace and she is feeling more like herself.      She notes the shoulder is improving.  She still has trouble sleeping on either side because her shoulder hurts when she gets up.  She notes that it is working, but she wishes she had more strength and function in it.      PHYSICAL EXAMINATION:  On exam today, she has 85 degrees of active forward elevation and 10 degrees of external rotation at the side.  She has internal rotation of the back to the gluteus.  Abduction is to 85 degrees.       IMAGING:  Radiographs show an intact reverse total shoulder arthroplasty in good position with a custom augmented glenoid component.      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      PLAN:  I had a nice talk with Ms. Etienne today.  I told her that I thought she was doing well and she can start on a course of physical therapy.  I will see her back at the next anniversary of her surgery or sooner should any additional problems arise.        Of note, she continues to have numbness in her foot and would like nonoperative management.  She has a bad history of reaction to previous shoulder injections of corticosteroid and would like to avoid  corticosteroid injections.  The persistent numbness is quite concerning to her and to me.     TT: 16mins  CT: 11 mins          TT: 16 mins  CT: 12 mins      Sincerely,        Jorge A Desir MD

## 2020-08-14 NOTE — TELEPHONE ENCOUNTER
DIAGNOSIS: Low back pain, unspecified back pain laterality/No imaging/Jorge A Desir MD in  ORTHOPEDICS/Medicare/Orthocn   APPOINTMENT DATE: 9/8   NOTES STATUS DETAILS   OFFICE NOTE from referring provider Internal    OFFICE NOTE from other specialist Care Everywhere/internal    DISCHARGE SUMMARY from hospital N/A    DISCHARGE REPORT from the ER N/A    OPERATIVE REPORT N/A    MEDICATION LIST Internal    MRI Internal    CT SCAN     XRAYS (IMAGES & REPORTS) received  dexa scan -[facility]      8/14/20  Sent request to [facility] for dexa scan    8/18/20  Sent 2nd fax request    8/23/20  Unable to push to pacs, asked for a disc to be sent,report was recieved    9/2/20  Called to verify disc was sent, it was mailed out 8/31/20 9/2/20  Imaging recieved

## 2020-08-19 ENCOUNTER — TELEPHONE (OUTPATIENT)
Dept: ORTHOPEDICS | Facility: CLINIC | Age: 70
End: 2020-08-19

## 2020-09-08 ENCOUNTER — OFFICE VISIT (OUTPATIENT)
Dept: ORTHOPEDICS | Facility: CLINIC | Age: 70
End: 2020-09-08
Payer: COMMERCIAL

## 2020-09-08 ENCOUNTER — PRE VISIT (OUTPATIENT)
Dept: ORTHOPEDICS | Facility: CLINIC | Age: 70
End: 2020-09-08

## 2020-09-08 VITALS — WEIGHT: 215 LBS | HEIGHT: 64 IN | RESPIRATION RATE: 16 BRPM | BODY MASS INDEX: 36.7 KG/M2

## 2020-09-08 DIAGNOSIS — R29.898 WEAKNESS OF LEFT FOOT: ICD-10-CM

## 2020-09-08 DIAGNOSIS — M54.16 LUMBAR RADICULOPATHY: Primary | ICD-10-CM

## 2020-09-08 ASSESSMENT — MIFFLIN-ST. JEOR: SCORE: 1488.88

## 2020-09-08 NOTE — LETTER
9/8/2020      RE: Donna Etienne  [id number] [facility] [address]       Pt is a 69 year old female referred by Dr Desir here today for:     Back pain:   Location: low back   Duration: 1 year   Radiation: bilateral feet   Numbness/ Tingling? Bilateral feet -> couple toes on R foot that tingle; not all the time and not that bad; L foot-> twisted it and broke a bone in it in January -> In June 2020, twisted L foot again after fall on her farm; Was placed in aircast for 2 months (June and July). Saw podiatrist in [place] -> was told it could be from the trauma and immobilization   Had numbness and tingling in L leg prior to foot fx on 1/2020; Experienced this after shoulder surgery which was 5 hours long -> got MRI ordered after surgery due to post-op symptoms   Weakness? Left knee weakness   Flexion or Extension bias: flexion can feel better  Red flags? None    Meds tried? aleve   PT? None specifically for back    Imaging? MRI 9/2019:  Impression: Multilevel lumbar spondylosis, most pronounced at L4-5  where there is mild-to-moderate spinal canal stenosis. Mild spinal  canal stenosis at L2-3. Moderate left neural foraminal stenosis is a  right-sided L4-5 and moderate left at L2-3.     Per Dr Desir's note on 8/10/20:  Of note, she continues to have numbness in her foot and would like nonoperative management.  She has a bad history of reaction to previous shoulder injections of corticosteroid and would like to avoid corticosteroid injections.  The persistent numbness is quite concerning to her and to me.       Past Medical History:   Diagnosis Date     Asthma      DJD (degenerative joint disease)      DJD (degenerative joint disease)      Hepatitis childhood    type A     History of colon polyps      Hypercholesterolemia      Hypertension      Liver disease     Hepatits A as a child     Osteopenia      Phlebitis     post childbirth 25 years ago     PONV (postoperative nausea and vomiting)       Past  Surgical History:   Procedure Laterality Date     ANGIOGRAPHY      HEART CATH     APPENDECTOMY       ARTHROPLASTY KNEE      Partial     ARTHROPLASTY KNEE      LEFT PARTIAL     C HAND/FINGER SURGERY UNLISTED      many on both     C SHOULDER SURG PROC UNLISTED  03/2017    rt. shoulder replacement     CL AFF SURGICAL PATHOLOGY       COLONOSCOPY       EXCISE MASS UPPER EXTREMITY  9/25/2012    Procedure: EXCISE MASS UPPER EXTREMITY;  Right Upper Arm Mass Excision;  Surgeon: Alla Arriaza MD;  Location: [facility] OR     HC INCISION TENDON SHEATH FINGER       KNEE SURGERY  11/18/2017    rt. knee partical replacement     RELEASE TRIGGER FINGER      RIGHT INDEX     REMOVE ANTIBIOTIC CEMENT BEADS / SPACER SHOULDER Left 1/15/2020    Procedure: Left shoulder spacer removal;  Surgeon: Jorge A Desir MD;  Location: [facility] OR     REMOVE HARDWARE ARTHROPLASTY SHOULDER. I&D, PLACE ANTIBIOTIC CEMENT BE Left 9/25/2019    Procedure: Left Infected Total Shoulder Arthroplasty Removal, Placement of Spacer, left humerus ORIF;  Surgeon: Jorge A Desir MD;  Location: [facility] OR     REPAIR TENDON FINGER(S)  7/12/2012    Procedure: REPAIR TENDON FINGER(S);  Right Index Finger Tendon Sheath Reconstruction Using Flexor  Carpi Radialis (1/2) Tendon Graft.      ;  Surgeon: Alla Arriaza MD;  Location: [facility]     REVERSE ARTHROPLASTY SHOULDER Left 1/15/2020    Procedure: Left Reverse Total Shoulder Arthroplasty;  Surgeon: Jorge A Desir MD;  Location: [facility] OR     SCAPHOID/LUNATE LIGAMENT REPAIR[       SUTURE SUSPENSIONPLASTY THUMB  2/7/2012    Procedure:SUTURE SUSPENSIONPLASTY THUMB; Left Thumb Trapezial Excision Suture Suspenionplasty  ; Surgeon:ALLA ARRIAZA; Location:[facility] OR     WRIST SURGERY        Current Outpatient Medications   Medication Sig Dispense Refill     acetaminophen (TYLENOL) 325 MG tablet Take 2 tablets (650 mg) by mouth every 4 hours as needed for mild pain or other 1 Bottle 0      Albuterol Sulfate (VENTOLIN IN) Inhale 2 puffs into the lungs as needed.       amoxicillin (AMOXIL) 500 MG capsule TAKE 4 CAPSULES BY MOUTH 1 HOUR BEFORE DENTAL WORK  5     Calcium Carbonate-Vitamin D (CALCIUM + D PO) Take 600 mg by mouth daily        desoximetasone (TOPICORT) 0.25 % external cream Apply topically daily as needed        Fluticasone-Salmeterol (ADVAIR HFA IN) Inhale 1 puff into the lungs. 1-2 TIMES DAILY AS NEEDED       metoprolol tartrate (LOPRESSOR) 25 MG tablet Take 1 tablet (25 mg) by mouth 2 times daily       Multiple Vitamin (DAILY MULTIVITAMIN PO) Take  by mouth daily.       senna-docusate (SENOKOT-S/PERICOLACE) 8.6-50 MG tablet Take 1 tablet by mouth 2 times daily 40 tablet 0     SIMVASTATIN PO Take 20 mg by mouth At Bedtime.       amLODIPine (NORVASC) 5 MG tablet Take 5 mg by mouth daily         Allergies   Allergen Reactions     Bee Pollen Shortness Of Breath     Prednisone Itching and Other (See Comments)     Swelling of the face     Animal Dander      Grass      Hydrocodone-Acetaminophen      Other reaction(s): Hives, Rash     Latex      Other reaction(s): Rash     Oxycodone-Acetaminophen Itching     Pollen Extract      Seasonal Allergies      Tramadol Nausea     Cortisone Other (See Comments)     Flushing and headache     No Clinical Screening - See Comments Nausea and Vomiting     Any kind of anesthesia     Rifampin Other (See Comments)     swelling around mouth      Social History     Tobacco Use     Smoking status: Never Smoker     Smokeless tobacco: Never Used   Substance Use Topics     Alcohol use: Yes     Comment: very seldom     Drug use: No      Family History   Problem Relation Age of Onset     Hypertension Mother      Osteoporosis Mother      Alcoholism Brother      Alcoholism Sister      Alcoholism Paternal Grandfather       ROS:   Gen- no fevers/chills   Rheum - no morning stiffness   Derm - no rash/ redness   Neuro - see HPI   Remainder of ROS negative.     Exam:   Resp 16   " Ht 1.615 m (5' 3.6\")   Wt 97.5 kg (215 lb)   BMI 37.37 kg/m         BACK:   ROM: flexion -full /extension -full /lateral rotation- full /side bend- full   Bony tenderness: None   Paraspinal tenderness: None.   Sensation: intact in b/l lower extremities.   Strength:3/5 w/ big toe extension on L  Maneuvers: Neg straight leg raise b/l. Neg Slump b/l Neg YA   Neuro: DTR's 2+      (M54.16) Lumbar radiculopathy  (primary encounter diagnosis)  Comment: exam is fairly benign except for significant weakness of L great toe extensor which would fit with an L5 radic vs deep peroneal nerve compression; she wishes to defer an EMG or repeat MRI at this time; will have her rehab; phone follow-up in 6 weeks; if no better, would proceed w/ EMG of L leg and/or lumbar MRI; precautions/ anticipatory guidance given  Plan: PHYSICAL THERAPY REFERRAL (External-Prints)            (R29.942) Weakness of left foot  Comment: see above  Plan: PHYSICAL THERAPY REFERRAL (External-Prints)            Alan Durant MD  September 8, 2020  10:04 AM      Alan Durant MD    "

## 2020-09-08 NOTE — PROGRESS NOTES
Pt is a 69 year old female referred by Dr Desir here today for:     Back pain:   Location: low back   Duration: 1 year   Radiation: bilateral feet   Numbness/ Tingling? Bilateral feet -> couple toes on R foot that tingle; not all the time and not that bad; L foot-> twisted it and broke a bone in it in January -> In June 2020, twisted L foot again after fall [place] her [place]; Was placed in aircast for 2 months (June and July). Saw podiatrist in [place] -> was told it could be from the trauma and immobilization   Had numbness and tingling in L leg prior to foot fx on 1/2020; Experienced this after shoulder surgery which was 5 hours long -> got MRI ordered after surgery due to post-op symptoms   Weakness? Left knee weakness   Flexion or Extension bias: flexion can feel better  Red flags? None    Meds tried? aleve   PT? None specifically for back    Imaging? MRI 9/2019:  Impression: Multilevel lumbar spondylosis, most pronounced at L4-5  where there is mild-to-moderate spinal canal stenosis. Mild spinal  canal stenosis at L2-3. Moderate left neural foraminal stenosis is a  right-sided L4-5 and moderate left at L2-3.     Per Dr Desir's note on 8/10/20:  Of note, she continues to have numbness in her foot and would like nonoperative management.  She has a bad history of reaction to previous shoulder injections of corticosteroid and would like to avoid corticosteroid injections.  The persistent numbness is quite concerning to her and to me.       Past Medical History:   Diagnosis Date     Asthma      DJD (degenerative joint disease)      DJD (degenerative joint disease)      Hepatitis childhood    type A     History of colon polyps      Hypercholesterolemia      Hypertension      Liver disease     Hepatits A as a child     Osteopenia      Phlebitis     post childbirth 25 years ago     PONV (postoperative nausea and vomiting)       Past Surgical History:   Procedure Laterality Date     ANGIOGRAPHY      HEART CATH      APPENDECTOMY       ARTHROPLASTY KNEE      Partial     ARTHROPLASTY KNEE      LEFT PARTIAL     C HAND/FINGER SURGERY UNLISTED      many on both     C SHOULDER SURG PROC UNLISTED  03/2017    rt. shoulder replacement     CL AFF SURGICAL PATHOLOGY       COLONOSCOPY       EXCISE MASS UPPER EXTREMITY  9/25/2012    Procedure: EXCISE MASS UPPER EXTREMITY;  Right Upper Arm Mass Excision;  Surgeon: Alla Arriaza MD;  Location: [facility]     HC INCISION TENDON SHEATH FINGER       KNEE SURGERY  11/18/2017    rt. knee partical replacement     RELEASE TRIGGER FINGER      RIGHT INDEX     REMOVE ANTIBIOTIC CEMENT BEADS / SPACER SHOULDER Left 1/15/2020    Procedure: Left shoulder spacer removal;  Surgeon: Jorge A Desir MD;  Location: [facility]     REMOVE HARDWARE ARTHROPLASTY SHOULDER. I&D, PLACE ANTIBIOTIC CEMENT BE Left 9/25/2019    Procedure: Left Infected Total Shoulder Arthroplasty Removal, Placement of Spacer, left humerus ORIF;  Surgeon: Jorge A Desir MD;  Location: [facility]     REPAIR TENDON FINGER(S)  7/12/2012    Procedure: REPAIR TENDON FINGER(S);  Right Index Finger Tendon Sheath Reconstruction Using Flexor  Carpi Radialis (1/2) Tendon Graft.      ;  Surgeon: Alla Arriaza MD;  [facility]     REVERSE ARTHROPLASTY SHOULDER Left 1/15/2020    Procedure: Left Reverse Total Shoulder Arthroplasty;  Surgeon: Jorge A Desir MD;  [facility]     SCAPHOID/LUNATE LIGAMENT REPAIR[       SUTURE SUSPENSIONPLASTY THUMB  2/7/2012    Procedure:SUTURE SUSPENSIONPLASTY THUMB; Left Thumb Trapezial Excision Suture Suspenionplasty  ; Surgeon:ALLA ARRIAZA; Location:[facility]     WRIST SURGERY        Current Outpatient Medications   Medication Sig Dispense Refill     acetaminophen (TYLENOL) 325 MG tablet Take 2 tablets (650 mg) by mouth every 4 hours as needed for mild pain or other 1 Bottle 0     Albuterol Sulfate (VENTOLIN IN) Inhale 2 puffs into the lungs as needed.        "amoxicillin (AMOXIL) 500 MG capsule TAKE 4 CAPSULES BY MOUTH 1 HOUR BEFORE DENTAL WORK  5     Calcium Carbonate-Vitamin D (CALCIUM + D PO) Take 600 mg by mouth daily        desoximetasone (TOPICORT) 0.25 % external cream Apply topically daily as needed        Fluticasone-Salmeterol (ADVAIR HFA IN) Inhale 1 puff into the lungs. 1-2 TIMES DAILY AS NEEDED       metoprolol tartrate (LOPRESSOR) 25 MG tablet Take 1 tablet (25 mg) by mouth 2 times daily       Multiple Vitamin (DAILY MULTIVITAMIN PO) Take  by mouth daily.       senna-docusate (SENOKOT-S/PERICOLACE) 8.6-50 MG tablet Take 1 tablet by mouth 2 times daily 40 tablet 0     SIMVASTATIN PO Take 20 mg by mouth At Bedtime.       amLODIPine (NORVASC) 5 MG tablet Take 5 mg by mouth daily         Allergies   Allergen Reactions     Bee Pollen Shortness Of Breath     Prednisone Itching and Other (See Comments)     Swelling of the face     Animal Dander      Grass      Hydrocodone-Acetaminophen      Other reaction(s): Hives, Rash     Latex      Other reaction(s): Rash     Oxycodone-Acetaminophen Itching     Pollen Extract      Seasonal Allergies      Tramadol Nausea     Cortisone Other (See Comments)     Flushing and headache     No Clinical Screening - See Comments Nausea and Vomiting     Any kind of anesthesia     Rifampin Other (See Comments)     swelling around mouth      Social History     Tobacco Use     Smoking status: Never Smoker     Smokeless tobacco: Never Used   Substance Use Topics     Alcohol use: Yes     Comment: very seldom     Drug use: No      Family History   Problem Relation Age of Onset     Hypertension Mother      Osteoporosis Mother      Alcoholism Brother      Alcoholism Sister      Alcoholism Paternal Grandfather       ROS:   Gen- no fevers/chills   Rheum - no morning stiffness   Derm - no rash/ redness   Neuro - see HPI   Remainder of ROS negative.     Exam:   Resp 16   Ht 1.615 m (5' 3.6\")   Wt 97.5 kg (215 lb)   BMI 37.37 kg/m         BACK: "   ROM: flexion -full /extension -full /lateral rotation- full /side bend- full   Bony tenderness: None   Paraspinal tenderness: None.   Sensation: intact in b/l lower extremities.   Strength:3/5 w/ big toe extension on L  Maneuvers: Neg straight leg raise b/l. Neg Slump b/l Neg YA   Neuro: DTR's 2+      (M54.16) Lumbar radiculopathy  (primary encounter diagnosis)  Comment: exam is fairly benign except for significant weakness of L great toe extensor which would fit with an L5 radic vs deep peroneal nerve compression; she wishes to defer an EMG or repeat MRI at this time; will have her rehab; phone follow-up in 6 weeks; if no better, would proceed w/ EMG of L leg and/or lumbar MRI; precautions/ anticipatory guidance given  Plan: PHYSICAL THERAPY REFERRAL (External-Prints)            (R23.395) Weakness of left foot  Comment: see above  Plan: PHYSICAL THERAPY REFERRAL (External-Prints)            Alan Durant MD  September 8, 2020  10:04 AM

## 2020-11-02 ENCOUNTER — VIRTUAL VISIT (OUTPATIENT)
Dept: ORTHOPEDICS | Facility: CLINIC | Age: 70
End: 2020-11-02
Payer: COMMERCIAL

## 2020-11-02 DIAGNOSIS — R29.898 WEAKNESS OF LEFT FOOT: ICD-10-CM

## 2020-11-02 DIAGNOSIS — M54.16 LUMBAR RADICULOPATHY: Primary | ICD-10-CM

## 2020-11-02 PROCEDURE — 99441 PR PHYSICIAN TELEPHONE EVALUATION 5-10 MIN: CPT | Mod: 95 | Performed by: FAMILY MEDICINE

## 2020-11-02 NOTE — PROGRESS NOTES
"Donna Etienne is a 69 year old female who is being evaluated via a billable telephone visit.      The patient has been notified of following:     \"This telephone visit will be conducted via a call between you and your physician/provider. We have found that certain health care needs can be provided without the need for a physical exam.  This service lets us provide the care you need with a short phone conversation.  If a prescription is necessary we can send it directly to your pharmacy.  If lab work is needed we can place an order for that and you can then stop by our lab to have the test done at a later time.    Telephone visits are billed at different rates depending on your insurance coverage. During this emergency period, for some insurers they may be billed the same as an in-person visit.  Please reach out to your insurance provider with any questions.    If during the course of the call the physician/provider feels a telephone visit is not appropriate, you will not be charged for this service.\"    Patient has given verbal consent for Telephone visit?  Yes    What phone number would you like to be contacted at? [phone]    How would you like to obtain your AVS? Karen      Call start: 8:34 AM  Call stop: 8:42 AM    Phone call duration: 8 minutes    Telephone follow-up from 9/8/20 visit for lumbar radiculopathy and foot weakness  Felt like PT helped  Does her home exercise diligently (85% of the time does daily)  Weakness is improved in her knee   Using Aleve and heating pad prn   Feels like improvement is acceptable  Has a sister w/ neuropathy in \"bad shape\"   Does intermittently get R toe numbness   Does feel unsteady at times when she stops her exercises       Per my last note:  (M54.16) Lumbar radiculopathy  (primary encounter diagnosis)  Comment: exam is fairly benign except for significant weakness of L great toe extensor which would fit with an L5 radic vs deep peroneal nerve compression; she " wishes to defer an EMG or repeat MRI at this time; will have her rehab; phone follow-up in 6 weeks; if no better, would proceed w/ EMG of L leg and/or lumbar MRI; precautions/ anticipatory guidance given  Plan: PHYSICAL THERAPY REFERRAL (External-Prints)      (M54.16) Lumbar radiculopathy  (primary encounter diagnosis)  Comment:   Plan: stable/improved w/ PT; will cont home program and f/u prn    (R29.898) Weakness of left foot  Comment:   Plan: see above    Alan Durant MD  November 2, 2020  8:43 AM

## 2020-11-02 NOTE — PROGRESS NOTES
Telephone follow-up from 9/8/20 visit for lumbar radiculopathy and foot weakness        Per my last note:  (M54.16) Lumbar radiculopathy  (primary encounter diagnosis)  Comment: exam is fairly benign except for significant weakness of L great toe extensor which would fit with an L5 radic vs deep peroneal nerve compression; she wishes to defer an EMG or repeat MRI at this time; will have her rehab; phone follow-up in 6 weeks; if no better, would proceed w/ EMG of L leg and/or lumbar MRI; precautions/ anticipatory guidance given  Plan: PHYSICAL THERAPY REFERRAL (External-Prints)

## 2020-11-02 NOTE — LETTER
"11/2/2020       RE: Donna Etienne  [address] [facility] [address]     Dear Colleague,    Thank you for referring your patient, Donna Etienne, to the [facility] [place] at [facility]. Please see a copy of my visit note below.    Telephone follow-up from 9/8/20 visit for lumbar radiculopathy and foot weakness        Per my last note:  (M54.16) Lumbar radiculopathy  (primary encounter diagnosis)  Comment: exam is fairly benign except for significant weakness of L great toe extensor which would fit with an L5 radic vs deep peroneal nerve compression; she wishes to defer an EMG or repeat MRI at this time; will have her rehab; phone follow-up in 6 weeks; if no better, would proceed w/ EMG of L leg and/or lumbar MRI; precautions/ anticipatory guidance given  Plan: PHYSICAL THERAPY REFERRAL (External-Prints)               Donna Etienne is a 69 year old female who is being evaluated via a billable telephone visit.      The patient has been notified of following:     \"This telephone visit will be conducted via a call between you and your physician/provider. We have found that certain health care needs can be provided without the need for a physical exam.  This service lets us provide the care you need with a short phone conversation.  If a prescription is necessary we can send it directly to your pharmacy.  If lab work is needed we can place an order for that and you can then stop by our lab to have the test done at a later time.    Telephone visits are billed at different rates depending on your insurance coverage. During this emergency period, for some insurers they may be billed the same as an in-person visit.  Please reach out to your insurance provider with any questions.    If during the course of the call the physician/provider feels a telephone visit is not appropriate, you will not be charged for this service.\"    Patient has " "given verbal consent for Telephone visit?  Yes    What phone number would you like to be contacted at? [phone]    How would you like to obtain your AVS? [email or web address]      Call start: 8:34 AM  Call stop: 8:42 AM    Phone call duration: 8 minutes    Telephone follow-up from 9/8/20 visit for lumbar radiculopathy and foot weakness  Felt like PT helped  Does her home exercise diligently (85% of the time does daily)  Weakness is improved in her knee   Using Aleve and heating pad prn   Feels like improvement is acceptable  Has a sister w/ neuropathy in \"bad shape\"   Does intermittently get R toe numbness   Does feel unsteady at times when she stops her exercises       Per my last note:  (M54.16) Lumbar radiculopathy  (primary encounter diagnosis)  Comment: exam is fairly benign except for significant weakness of L great toe extensor which would fit with an L5 radic vs deep peroneal nerve compression; she wishes to defer an EMG or repeat MRI at this time; will have her rehab; phone follow-up in 6 weeks; if no better, would proceed w/ EMG of L leg and/or lumbar MRI; precautions/ anticipatory guidance given  Plan: PHYSICAL THERAPY REFERRAL (External-Prints)      (M54.16) Lumbar radiculopathy  (primary encounter diagnosis)  Comment:   Plan: stable/improved w/ PT; will cont home program and f/u prn    (R29.898) Weakness of left foot  Comment:   Plan: see above    Alan Durant MD  November 2, 2020  8:43 AM        "

## 2020-11-02 NOTE — LETTER
"  11/2/2020      RE: Donna Etienne  [address] [facility] [address]       Telephone follow-up from 9/8/20 visit for lumbar radiculopathy and foot weakness        Per my last note:  (M54.16) Lumbar radiculopathy  (primary encounter diagnosis)  Comment: exam is fairly benign except for significant weakness of L great toe extensor which would fit with an L5 radic vs deep peroneal nerve compression; she wishes to defer an EMG or repeat MRI at this time; will have her rehab; phone follow-up in 6 weeks; if no better, would proceed w/ EMG of L leg and/or lumbar MRI; precautions/ anticipatory guidance given  Plan: PHYSICAL THERAPY REFERRAL (External-Prints)               Donna Etienne is a 69 year old female who is being evaluated via a billable telephone visit.      The patient has been notified of following:     \"This telephone visit will be conducted via a call between you and your physician/provider. We have found that certain health care needs can be provided without the need for a physical exam.  This service lets us provide the care you need with a short phone conversation.  If a prescription is necessary we can send it directly to your pharmacy.  If lab work is needed we can place an order for that and you can then stop by our lab to have the test done at a later time.    Telephone visits are billed at different rates depending on your insurance coverage. During this emergency period, for some insurers they may be billed the same as an in-person visit.  Please reach out to your insurance provider with any questions.    If during the course of the call the physician/provider feels a telephone visit is not appropriate, you will not be charged for this service.\"    Patient has given verbal consent for Telephone visit?  Yes    What phone number would you like to be contacted at? [phone]    How would you like to obtain your AVS? [employer]      Call start: 8:34 AM  Call stop: 8:42 AM    Phone call " "duration: 8 minutes    Telephone follow-up from 9/8/20 visit for lumbar radiculopathy and foot weakness  Felt like PT helped  Does her home exercise diligently (85% of the time does daily)  Weakness is improved in her knee   Using Aleve and heating pad prn   Feels like improvement is acceptable  Has a sister w/ neuropathy in \"bad shape\"   Does intermittently get R toe numbness   Does feel unsteady at times when she stops her exercises       Per my last note:  (M54.16) Lumbar radiculopathy  (primary encounter diagnosis)  Comment: exam is fairly benign except for significant weakness of L great toe extensor which would fit with an L5 radic vs deep peroneal nerve compression; she wishes to defer an EMG or repeat MRI at this time; will have her rehab; phone follow-up in 6 weeks; if no better, would proceed w/ EMG of L leg and/or lumbar MRI; precautions/ anticipatory guidance given  Plan: PHYSICAL THERAPY REFERRAL (External-Prints)      (M54.16) Lumbar radiculopathy  (primary encounter diagnosis)  Comment:   Plan: stable/improved w/ PT; will cont home program and f/u prn    (R29.898) Weakness of left foot  Comment:   Plan: see above    Alan Durant MD  November 2, 2020  8:43 AM        Alan Durant MD    "

## 2020-11-08 ENCOUNTER — HEALTH MAINTENANCE LETTER (OUTPATIENT)
Age: 70
End: 2020-11-08

## 2021-03-28 ENCOUNTER — HEALTH MAINTENANCE LETTER (OUTPATIENT)
Age: 71
End: 2021-03-28

## 2021-05-29 ENCOUNTER — RECORDS - HEALTHEAST (OUTPATIENT)
Dept: ADMINISTRATIVE | Facility: CLINIC | Age: 71
End: 2021-05-29

## 2021-09-11 ENCOUNTER — HEALTH MAINTENANCE LETTER (OUTPATIENT)
Age: 71
End: 2021-09-11

## 2022-02-26 ENCOUNTER — HEALTH MAINTENANCE LETTER (OUTPATIENT)
Age: 72
End: 2022-02-26

## 2022-04-23 ENCOUNTER — HEALTH MAINTENANCE LETTER (OUTPATIENT)
Age: 72
End: 2022-04-23

## 2022-10-30 ENCOUNTER — HEALTH MAINTENANCE LETTER (OUTPATIENT)
Age: 72
End: 2022-10-30

## 2023-06-01 ENCOUNTER — HEALTH MAINTENANCE LETTER (OUTPATIENT)
Age: 73
End: 2023-06-01

## (undated) DEVICE — SYR BULB IRRIG 50ML LATEX FREE 0035280

## (undated) DEVICE — DRAPE IOBAN INCISE 23X17" 6650EZ

## (undated) DEVICE — DRSG AQUACEL AG 3.5X13.75" HYDROFIBER 412012

## (undated) DEVICE — DRAPE U-DRAPE 1015NSD NON-STERILE

## (undated) DEVICE — SU VICRYL 0 CT-1 27" UND J260H

## (undated) DEVICE — DRAIN HEMOVAC RESERVOIR KIT 10FR 1/8" MED 00-2550-002-10

## (undated) DEVICE — KIT CULTURE TRANSPORT SYS A.C.T. II DUAL ANEROBE R124022

## (undated) DEVICE — ESU GROUND PAD ADULT W/CORD E7507

## (undated) DEVICE — SOL NACL 0.9% IRRIG 1000ML BOTTLE 2F7124

## (undated) DEVICE — SU VICRYL 2-0 CT-1 27" UND J259H

## (undated) DEVICE — DRAPE U-POUCH 34X29" 1067

## (undated) DEVICE — BRUSH SURGICAL SCRUB W/4% CHLORHEXIDINE GLUCONATE SOL 4458A

## (undated) DEVICE — SU PDS II 2-0 SH 27" Z317H

## (undated) DEVICE — ESU CLEANER TIP 31142717

## (undated) DEVICE — BONE CLEANING TIP INTERPULSE  0210-010-000

## (undated) DEVICE — RESTRAINT LIMB HOLDER ANKLE/WRIST FOAM W/QUICK RELEASE 2533

## (undated) DEVICE — DRAPE C-ARM W/STRAPS 42X72" 07-CA104

## (undated) DEVICE — GLOVE PROTEXIS POWDER FREE 8.5 ORTHOPEDIC 2D73ET85

## (undated) DEVICE — GOWN XLG DISP 9545

## (undated) DEVICE — LIGHT HANDLE X2

## (undated) DEVICE — SUCTION TIP YANKAUER W/O VENT K86

## (undated) DEVICE — SOL WATER IRRIG 1000ML BOTTLE 2F7114

## (undated) DEVICE — SU FIBERWIRE 2 38" T-8 NDL  AR-7206

## (undated) DEVICE — DRSG DRAIN 4X4" 7086

## (undated) DEVICE — LINEN DRAPE 54X72" 5467

## (undated) DEVICE — POSITIONER ARMBOARD FOAM 1PAIR LF FP-ARMB1

## (undated) DEVICE — STRAP KNEE/BODY 31143004

## (undated) DEVICE — SU SILK 2-0 TIE 12X30" A305H

## (undated) DEVICE — SUCTION IRR SYSTEM W/O TIP INTERPULSE HANDPIECE 0210-100-000

## (undated) DEVICE — ESU ELEC NDL 1" E1552

## (undated) DEVICE — 2.7MM PERIPHERAL SCREW DRILL BIT

## (undated) DEVICE — ESU PENCIL W/HOLSTER E2350H

## (undated) DEVICE — SUCTION MANIFOLD DORNOCH ULTRA CART UL-CL500

## (undated) DEVICE — BACTISURE

## (undated) DEVICE — SU PDS II 1 CT-1 27"Z341H

## (undated) DEVICE — GLOVE PROTEXIS W/NEU-THERA 8.0  2D73TE80

## (undated) DEVICE — IMM KIT SHOULDER STABILIZATION 7210573

## (undated) DEVICE — SU ETHIBOND 1 CTX CR 8/18" CX30D

## (undated) DEVICE — DRAPE POUCH INSTRUMENT 1018

## (undated) DEVICE — LINEN TOWEL PACK X5 5464

## (undated) DEVICE — SPONGE LAP 18X18" X8435

## (undated) DEVICE — Device

## (undated) DEVICE — DRSG TEGADERM 4X4 3/4" 1626W

## (undated) DEVICE — SU PDS II 2-0 CT-1 27" Z339H

## (undated) DEVICE — DRSG STERI STRIP 1/2X4" R1547

## (undated) DEVICE — GOWN IMPERVIOUS SPECIALTY XLG/XLONG 32474

## (undated) DEVICE — SPECIMEN CONTAINER 5OZ STERILE 2600SA

## (undated) DEVICE — DRAPE C-ARM OEC MINI VIEW 6800   00-901917-01

## (undated) DEVICE — IMM KIT SHOULDER TMAX MASK FACE 7210559

## (undated) DEVICE — BLADE SAW SAGITTAL STRK 20.7X85X0.89MM 2108-109-000S13

## (undated) DEVICE — SU PDS II 0 CT-1 27" Z340H

## (undated) DEVICE — DRSG TELFA 3X8" 1238

## (undated) DEVICE — SOL HYDROGEN PEROXIDE 3% 4OZ BOTTLE F0010

## (undated) DEVICE — GLOVE PROTEXIS W/NEU-THERA 8.5  2D73TE85

## (undated) DEVICE — 3.2MM CENTRAL SCREW DRILL BIT

## (undated) DEVICE — GLOVE PROTEXIS BLUE W/NEU-THERA 7.5  2D73EB75

## (undated) DEVICE — DRSG AQUACEL AG 3.5X9.75" HYDROFIBER 412011

## (undated) DEVICE — LINEN ORTHO PACK 5446

## (undated) DEVICE — STPL SKIN 35W ROTATING HEAD PRW35

## (undated) DEVICE — PACK SET-UP STD 9102

## (undated) DEVICE — BLADE KNIFE SURG 10 371110

## (undated) DEVICE — CAST PADDING 3" STERILE 9043S

## (undated) DEVICE — COVER CAMERA IN-LIGHT DISP LT-C02

## (undated) DEVICE — SU MONOCRYL 3-0 PS-2 18" UND Y497G

## (undated) RX ORDER — HYDROMORPHONE HYDROCHLORIDE 2 MG/1
TABLET ORAL
Status: DISPENSED
Start: 2020-01-15

## (undated) RX ORDER — DEXAMETHASONE SODIUM PHOSPHATE 4 MG/ML
INJECTION, SOLUTION INTRA-ARTICULAR; INTRALESIONAL; INTRAMUSCULAR; INTRAVENOUS; SOFT TISSUE
Status: DISPENSED
Start: 2020-01-15

## (undated) RX ORDER — FENTANYL CITRATE 50 UG/ML
INJECTION, SOLUTION INTRAMUSCULAR; INTRAVENOUS
Status: DISPENSED
Start: 2019-09-25

## (undated) RX ORDER — ONDANSETRON 2 MG/ML
INJECTION INTRAMUSCULAR; INTRAVENOUS
Status: DISPENSED
Start: 2020-01-15

## (undated) RX ORDER — DEXAMETHASONE SODIUM PHOSPHATE 4 MG/ML
INJECTION, SOLUTION INTRA-ARTICULAR; INTRALESIONAL; INTRAMUSCULAR; INTRAVENOUS; SOFT TISSUE
Status: DISPENSED
Start: 2019-09-25

## (undated) RX ORDER — FENTANYL CITRATE 50 UG/ML
INJECTION, SOLUTION INTRAMUSCULAR; INTRAVENOUS
Status: DISPENSED
Start: 2020-01-15

## (undated) RX ORDER — PHENYLEPHRINE HCL IN 0.9% NACL 1 MG/10 ML
SYRINGE (ML) INTRAVENOUS
Status: DISPENSED
Start: 2019-09-25

## (undated) RX ORDER — PROPOFOL 10 MG/ML
INJECTION, EMULSION INTRAVENOUS
Status: DISPENSED
Start: 2019-09-25

## (undated) RX ORDER — PROPOFOL 10 MG/ML
INJECTION, EMULSION INTRAVENOUS
Status: DISPENSED
Start: 2020-01-15

## (undated) RX ORDER — VANCOMYCIN HYDROCHLORIDE 1 G/20ML
INJECTION, POWDER, LYOPHILIZED, FOR SOLUTION INTRAVENOUS
Status: DISPENSED
Start: 2019-09-25

## (undated) RX ORDER — LIDOCAINE HYDROCHLORIDE 20 MG/ML
INJECTION, SOLUTION EPIDURAL; INFILTRATION; INTRACAUDAL; PERINEURAL
Status: DISPENSED
Start: 2020-01-15

## (undated) RX ORDER — LIDOCAINE HYDROCHLORIDE 20 MG/ML
INJECTION, SOLUTION EPIDURAL; INFILTRATION; INTRACAUDAL; PERINEURAL
Status: DISPENSED
Start: 2019-09-25

## (undated) RX ORDER — BUPIVACAINE HYDROCHLORIDE 2.5 MG/ML
INJECTION, SOLUTION EPIDURAL; INFILTRATION; INTRACAUDAL
Status: DISPENSED
Start: 2020-01-15

## (undated) RX ORDER — ACETAMINOPHEN 325 MG/1
TABLET ORAL
Status: DISPENSED
Start: 2020-01-15

## (undated) RX ORDER — PHENYLEPHRINE HCL IN 0.9% NACL 1 MG/10 ML
SYRINGE (ML) INTRAVENOUS
Status: DISPENSED
Start: 2020-01-15

## (undated) RX ORDER — GLYCOPYRROLATE 0.2 MG/ML
INJECTION, SOLUTION INTRAMUSCULAR; INTRAVENOUS
Status: DISPENSED
Start: 2019-09-25

## (undated) RX ORDER — HYDROMORPHONE HYDROCHLORIDE 1 MG/ML
INJECTION, SOLUTION INTRAMUSCULAR; INTRAVENOUS; SUBCUTANEOUS
Status: DISPENSED
Start: 2019-09-25

## (undated) RX ORDER — ONDANSETRON 2 MG/ML
INJECTION INTRAMUSCULAR; INTRAVENOUS
Status: DISPENSED
Start: 2019-09-25

## (undated) RX ORDER — SCOLOPAMINE TRANSDERMAL SYSTEM 1 MG/1
PATCH, EXTENDED RELEASE TRANSDERMAL
Status: DISPENSED
Start: 2019-09-25

## (undated) RX ORDER — SCOLOPAMINE TRANSDERMAL SYSTEM 1 MG/1
PATCH, EXTENDED RELEASE TRANSDERMAL
Status: DISPENSED
Start: 2020-01-15

## (undated) RX ORDER — EPHEDRINE SULFATE 50 MG/ML
INJECTION, SOLUTION INTRAMUSCULAR; INTRAVENOUS; SUBCUTANEOUS
Status: DISPENSED
Start: 2020-01-15

## (undated) RX ORDER — HYDROMORPHONE HYDROCHLORIDE 1 MG/ML
INJECTION, SOLUTION INTRAMUSCULAR; INTRAVENOUS; SUBCUTANEOUS
Status: DISPENSED
Start: 2020-01-15